# Patient Record
Sex: MALE | Race: WHITE | Employment: OTHER | URBAN - METROPOLITAN AREA
[De-identification: names, ages, dates, MRNs, and addresses within clinical notes are randomized per-mention and may not be internally consistent; named-entity substitution may affect disease eponyms.]

---

## 2019-03-20 RX ORDER — ATORVASTATIN CALCIUM 20 MG/1
20 TABLET, FILM COATED ORAL DAILY
COMMUNITY

## 2019-03-20 RX ORDER — LORATADINE 10 MG/1
10 TABLET ORAL DAILY
COMMUNITY

## 2019-03-20 RX ORDER — MOMETASONE FUROATE 50 UG/1
2 SPRAY, METERED NASAL DAILY
COMMUNITY
End: 2019-11-09

## 2019-03-20 RX ORDER — IRBESARTAN 300 MG/1
300 TABLET ORAL DAILY
COMMUNITY
End: 2019-06-07 | Stop reason: SDUPTHER

## 2019-03-20 RX ORDER — ASPIRIN 81 MG/1
81 TABLET ORAL DAILY
Status: ON HOLD | COMMUNITY
End: 2019-03-28 | Stop reason: SDUPTHER

## 2019-03-27 NOTE — H&P
[facility] PRE-OP HISTORY AND PHYSICAL    Subjective:     Patient is a 54 y.o. male presented with a history of left knee pain. He has had on-and-off problems with his left knee before in the past.  His symptoms have worsened over the past several months. Manuel Byrne has tried to increase his activity but every time he increases his activity he has recurrent pain of the knee. MRI was performed to evaluate for potential meniscal tear. MRI showed a complex meniscal tear extending to posterior root and ACL appears diminished in size possibly representing a partial tear or chronic tear that scarred. Patient Active Problem List    Diagnosis Date Noted    Hyperlipidemia 10/09/2011    HTN (hypertension) 10/09/2011    Carbuncle 10/09/2011    Lipoma 10/09/2011     Past Medical History:   Diagnosis Date    Adverse effect of anesthesia     TAKES A LONG TIME TO WAKE UP    Arthritis     Carbuncle 10/9/2011    Depression     GERD (gastroesophageal reflux disease)     HTN (hypertension) 10/9/2011    Hyperlipidemia 10/9/2011    Hypertension     Lipoma 10/9/2011    Morbid obesity ([address]     Sleep apnea     CPAP       History reviewed. No pertinent surgical history. Prior to Admission medications    Medication Sig Start Date End Date Taking? Authorizing Provider   aspirin delayed-release 81 mg tablet Take 81 mg by mouth daily. Yes Provider, Historical   irbesartan (AVAPRO) 300 mg tablet Take 300 mg by mouth daily. Yes Provider, Historical   atorvastatin (LIPITOR) 20 mg tablet Take 20 mg by mouth daily. Yes Provider, Historical   mometasone (NASONEX) 50 mcg/actuation nasal spray 2 Sprays daily. Yes Provider, Historical   loratadine (CLARITIN) 10 mg tablet Take 10 mg by mouth daily. Yes Provider, Historical   citalopram (CELEXA) 40 mg tablet Take 40 mg by mouth daily.    Yes Other, MD Pedro     Allergies   Allergen Reactions    Other Food Other (comments)     PEANUTS: NAUSEA AND UPSET STOMACH    Avapro [Irbesartan] Other (comments)     PT STATES THIS IS NOT AN ALLERGY      Social History     Tobacco Use    Smoking status: Never Smoker    Smokeless tobacco: Never Used   Substance Use Topics    Alcohol use: Yes     Comment: RARELY      Family History   Problem Relation Age of Onset    Cancer Mother         BREAST    Diabetes Mother     Hypertension Mother     Hypertension Father     Heart Disease Father     No Known Problems Sister     Deep Vein Thrombosis Brother     Deep Vein Thrombosis Other     Anesth Problems Neg Hx       Review of Systems  A comprehensive review of systems was negative except for that written in the HPI. Objective:     General: awake, alert, NAD  HEENT: normocephalic, atraumatic  Neck: normal C-spine, no tenderness, full ROM without pain  Cardiovascular: regular rate, palpable peripheral pulses  Pulmonary: normal air exchange, respiratory effort normal with no retractions  Abdomen: soft, nontender     Left knee: Skin intact. No effusion. There is full range motion of the knee.  He has medial joint line tenderness. There is pain with Tim's maneuver for medial meniscus.  He has no lateral joint tenderness.  There is increased anterior translation with Lachman's exam when compared to his contralateral knee.  This is consistent with his prior knee examination. Roger Mary has a negative posterior drawer test.  He has good quad tone and strength.  There is no soft tissue swelling noted distally. Motor strength and sensation are full and intact in the distal extremity. Pulses are 2+ and the foot is warm and well perfused. Imaging Review  MRI shows evidence of a complex meniscal tear extending to posterior root and ACL appears diminished in size possibly representing a partial tear or chronic tear that scarred.         Assessment:     Left knee medial meniscal tear  Left ACL tear      Plan:     Treatment options were discussed with the patient. Roger Mary wishes to be more active and feels that his left knee symptoms are preventing him from doing so. Nivia Rouse has failed to respond to conservative treatment modalities for his left knee.  We discussed treatment in the form of an arthroscopic examination of his left knee as well as a evaluation under anesthesia of his anterior cruciate ligament.  He demonstrates instability consistent with anterior cruciate ligament on both exam and  visual inspection, we'll proceed with an arthroscopic-assisted anterior cruciate ligament reconstruction.  His medial meniscus will also be evaluated at that time and the decision for repair versus meniscectomy will be made on an acute basis of the location and configuration of his tear.  We have discussed the risks and benefits of this.  We discussed the 2 protocols of rehabilitation that would follow. The risks and benefits were described to the patient.  The patient understands there is a risk of infection, postoperative pain, numbness, tingling, stiffness DVT, PE, MI, CVA and any other unforeseen events.  The patient also understands there is a long rehabilitative process that typically follows the surgical procedure.  We talked about the possibility of not being able to alleviate all of the discomfort.  Also, I explained Driver Cure is no guarantee all function and strength will return.  The patient understands the possiblity that  implants may be utilized during this surgery.  The patient also understands the generalized, associated risk of anesthetic and wishes to proceed in an elective fashion.       Ibis Cheung MD

## 2019-03-28 ENCOUNTER — HOSPITAL ENCOUNTER (OUTPATIENT)
Age: 49
Discharge: HOME | End: 2019-03-28
Attending: ORTHOPAEDIC SURGERY | Admitting: ORTHOPAEDIC SURGERY
Payer: COMMERCIAL

## 2019-03-28 ENCOUNTER — ANESTHESIA EVENT (OUTPATIENT)
Dept: SURGERY | Age: 49
End: 2019-03-28
Payer: COMMERCIAL

## 2019-03-28 ENCOUNTER — ANESTHESIA (OUTPATIENT)
Dept: SURGERY | Age: 49
End: 2019-03-28
Payer: COMMERCIAL

## 2019-03-28 VITALS
TEMPERATURE: 98 F | BODY MASS INDEX: 33.88 KG/M2 | HEART RATE: 87 BPM | RESPIRATION RATE: 10 BRPM | DIASTOLIC BLOOD PRESSURE: 84 MMHG | WEIGHT: 242 LBS | HEIGHT: 71 IN | OXYGEN SATURATION: 98 % | SYSTOLIC BLOOD PRESSURE: 132 MMHG

## 2019-03-28 DIAGNOSIS — I10 ESSENTIAL HYPERTENSION: ICD-10-CM

## 2019-03-28 DIAGNOSIS — D17.20 LIPOMA OF LOWER EXTREMITY, UNSPECIFIED LATERALITY: ICD-10-CM

## 2019-03-28 DIAGNOSIS — M23.204 OLD COMPLEX TEAR OF MEDIAL MENISCUS OF LEFT KNEE: Primary | ICD-10-CM

## 2019-03-28 DIAGNOSIS — E78.49 OTHER HYPERLIPIDEMIA: ICD-10-CM

## 2019-03-28 DIAGNOSIS — L02.93 CARBUNCLE: ICD-10-CM

## 2019-03-28 PROBLEM — S83.232A COMPLEX TEAR OF MEDIAL MENISCUS OF LEFT KNEE: Status: ACTIVE | Noted: 2019-03-28

## 2019-03-28 LAB
GLUCOSE BLD STRIP.AUTO-MCNC: 106 MG/DL (ref 65–100)
SERVICE CMNT-IMP: ABNORMAL

## 2019-03-28 PROCEDURE — 77030010509 HC AIRWY LMA MSK TELE -A: Performed by: ANESTHESIOLOGY

## 2019-03-28 PROCEDURE — 77030020782 HC GWN BAIR PAWS FLX 3M -B

## 2019-03-28 PROCEDURE — 76210000016 HC OR PH I REC 1 TO 1.5 HR: Performed by: ORTHOPAEDIC SURGERY

## 2019-03-28 PROCEDURE — 77030020269 HC MISC IMPL: Performed by: ORTHOPAEDIC SURGERY

## 2019-03-28 PROCEDURE — 77030018835 HC SOL IRR LR ICUM -A: Performed by: ORTHOPAEDIC SURGERY

## 2019-03-28 PROCEDURE — C1713 ANCHOR/SCREW BN/BN,TIS/BN: HCPCS | Performed by: ORTHOPAEDIC SURGERY

## 2019-03-28 PROCEDURE — 77030031139 HC SUT VCRL2 J&J -A: Performed by: ORTHOPAEDIC SURGERY

## 2019-03-28 PROCEDURE — 77030021162 HC TU IRR ENDOSC BAXT -A: Performed by: ORTHOPAEDIC SURGERY

## 2019-03-28 PROCEDURE — 76010000172 HC OR TIME 2.5 TO 3 HR INTENSV-TIER 1: Performed by: ORTHOPAEDIC SURGERY

## 2019-03-28 PROCEDURE — 74011250637 HC RX REV CODE- 250/637: Performed by: ANESTHESIOLOGY

## 2019-03-28 PROCEDURE — 77030000032 HC CUF TRNQT ZIMM -B: Performed by: ORTHOPAEDIC SURGERY

## 2019-03-28 PROCEDURE — 82962 GLUCOSE BLOOD TEST: CPT

## 2019-03-28 PROCEDURE — 77030020268 HC MISC GENERAL SUPPLY: Performed by: ORTHOPAEDIC SURGERY

## 2019-03-28 PROCEDURE — 74011250636 HC RX REV CODE- 250/636

## 2019-03-28 PROCEDURE — 74011250636 HC RX REV CODE- 250/636: Performed by: ANESTHESIOLOGY

## 2019-03-28 PROCEDURE — 77030011640 HC PAD GRND REM COVD -A: Performed by: ORTHOPAEDIC SURGERY

## 2019-03-28 PROCEDURE — 74011250636 HC RX REV CODE- 250/636: Performed by: ORTHOPAEDIC SURGERY

## 2019-03-28 PROCEDURE — 77030006884 HC BLD SHV INCIS S&N -B: Performed by: ORTHOPAEDIC SURGERY

## 2019-03-28 PROCEDURE — 77030008496 HC TBNG ARTHSC IRR S&N -B: Performed by: ORTHOPAEDIC SURGERY

## 2019-03-28 PROCEDURE — 77030002933 HC SUT MCRYL J&J -A: Performed by: ORTHOPAEDIC SURGERY

## 2019-03-28 PROCEDURE — 77030018547 HC SUT ETHBND1 J&J -B: Performed by: ORTHOPAEDIC SURGERY

## 2019-03-28 PROCEDURE — 77030002922 HC SUT FBRWRE ARTH -B: Performed by: ORTHOPAEDIC SURGERY

## 2019-03-28 PROCEDURE — 77030016678 HC BUR SHV4 S&N -B: Performed by: ORTHOPAEDIC SURGERY

## 2019-03-28 PROCEDURE — 77030003601 HC NDL NRV BLK BBMI -A

## 2019-03-28 PROCEDURE — 76210000021 HC REC RM PH II 0.5 TO 1 HR: Performed by: ORTHOPAEDIC SURGERY

## 2019-03-28 PROCEDURE — 74011000250 HC RX REV CODE- 250: Performed by: ORTHOPAEDIC SURGERY

## 2019-03-28 PROCEDURE — 76060000037 HC ANESTHESIA 3 TO 3.5 HR: Performed by: ORTHOPAEDIC SURGERY

## 2019-03-28 PROCEDURE — 74011000272 HC RX REV CODE- 272: Performed by: ORTHOPAEDIC SURGERY

## 2019-03-28 PROCEDURE — 77030010428: Performed by: ORTHOPAEDIC SURGERY

## 2019-03-28 DEVICE — ULTRABUTTON ADJUSTABLE FIXATION DEVICE
Type: IMPLANTABLE DEVICE | Site: KNEE | Status: FUNCTIONAL
Brand: ULTRABUTTON

## 2019-03-28 RX ORDER — ONDANSETRON 2 MG/ML
4 INJECTION INTRAMUSCULAR; INTRAVENOUS AS NEEDED
Status: DISCONTINUED | OUTPATIENT
Start: 2019-03-28 | End: 2019-03-28 | Stop reason: HOSPADM

## 2019-03-28 RX ORDER — SODIUM CHLORIDE 9 MG/ML
25 INJECTION, SOLUTION INTRAVENOUS CONTINUOUS
Status: DISCONTINUED | OUTPATIENT
Start: 2019-03-28 | End: 2019-03-28 | Stop reason: HOSPADM

## 2019-03-28 RX ORDER — FENTANYL CITRATE 50 UG/ML
50 INJECTION, SOLUTION INTRAMUSCULAR; INTRAVENOUS AS NEEDED
Status: DISCONTINUED | OUTPATIENT
Start: 2019-03-28 | End: 2019-03-28 | Stop reason: HOSPADM

## 2019-03-28 RX ORDER — ONDANSETRON 2 MG/ML
INJECTION INTRAMUSCULAR; INTRAVENOUS AS NEEDED
Status: DISCONTINUED | OUTPATIENT
Start: 2019-03-28 | End: 2019-03-28 | Stop reason: HOSPADM

## 2019-03-28 RX ORDER — SODIUM CHLORIDE, SODIUM LACTATE, POTASSIUM CHLORIDE, CALCIUM CHLORIDE 600; 310; 30; 20 MG/100ML; MG/100ML; MG/100ML; MG/100ML
1000 INJECTION, SOLUTION INTRAVENOUS CONTINUOUS
Status: DISCONTINUED | OUTPATIENT
Start: 2019-03-28 | End: 2019-03-28 | Stop reason: HOSPADM

## 2019-03-28 RX ORDER — OXYCODONE HYDROCHLORIDE 5 MG/1
5 TABLET ORAL AS NEEDED
Status: DISCONTINUED | OUTPATIENT
Start: 2019-03-28 | End: 2019-03-28 | Stop reason: HOSPADM

## 2019-03-28 RX ORDER — ASPIRIN 81 MG/1
325 TABLET ORAL 2 TIMES DAILY
Qty: 60 TAB | Refills: 0 | Status: SHIPPED | OUTPATIENT
Start: 2019-03-28

## 2019-03-28 RX ORDER — CEFAZOLIN SODIUM/WATER 2 G/20 ML
2 SYRINGE (ML) INTRAVENOUS ONCE
Status: COMPLETED | OUTPATIENT
Start: 2019-03-28 | End: 2019-03-28

## 2019-03-28 RX ORDER — KETOROLAC TROMETHAMINE 30 MG/ML
INJECTION, SOLUTION INTRAMUSCULAR; INTRAVENOUS
Status: COMPLETED
Start: 2019-03-28 | End: 2019-03-28

## 2019-03-28 RX ORDER — MIDAZOLAM HYDROCHLORIDE 1 MG/ML
0.5 INJECTION, SOLUTION INTRAMUSCULAR; INTRAVENOUS
Status: DISCONTINUED | OUTPATIENT
Start: 2019-03-28 | End: 2019-03-28 | Stop reason: HOSPADM

## 2019-03-28 RX ORDER — FENTANYL CITRATE 50 UG/ML
25 INJECTION, SOLUTION INTRAMUSCULAR; INTRAVENOUS
Status: COMPLETED | OUTPATIENT
Start: 2019-03-28 | End: 2019-03-28

## 2019-03-28 RX ORDER — LIDOCAINE HYDROCHLORIDE 10 MG/ML
0.1 INJECTION, SOLUTION EPIDURAL; INFILTRATION; INTRACAUDAL; PERINEURAL AS NEEDED
Status: DISCONTINUED | OUTPATIENT
Start: 2019-03-28 | End: 2019-03-28 | Stop reason: HOSPADM

## 2019-03-28 RX ORDER — SODIUM CHLORIDE, SODIUM LACTATE, POTASSIUM CHLORIDE, CALCIUM CHLORIDE 600; 310; 30; 20 MG/100ML; MG/100ML; MG/100ML; MG/100ML
100 INJECTION, SOLUTION INTRAVENOUS CONTINUOUS
Status: DISCONTINUED | OUTPATIENT
Start: 2019-03-28 | End: 2019-03-28 | Stop reason: HOSPADM

## 2019-03-28 RX ORDER — ROPIVACAINE HYDROCHLORIDE 5 MG/ML
INJECTION, SOLUTION EPIDURAL; INFILTRATION; PERINEURAL
Status: COMPLETED | OUTPATIENT
Start: 2019-03-28 | End: 2019-03-28

## 2019-03-28 RX ORDER — MIDAZOLAM HYDROCHLORIDE 1 MG/ML
INJECTION, SOLUTION INTRAMUSCULAR; INTRAVENOUS AS NEEDED
Status: DISCONTINUED | OUTPATIENT
Start: 2019-03-28 | End: 2019-03-28 | Stop reason: HOSPADM

## 2019-03-28 RX ORDER — PROPOFOL 10 MG/ML
INJECTION, EMULSION INTRAVENOUS AS NEEDED
Status: DISCONTINUED | OUTPATIENT
Start: 2019-03-28 | End: 2019-03-28 | Stop reason: HOSPADM

## 2019-03-28 RX ORDER — MORPHINE SULFATE 10 MG/ML
2 INJECTION, SOLUTION INTRAMUSCULAR; INTRAVENOUS
Status: DISCONTINUED | OUTPATIENT
Start: 2019-03-28 | End: 2019-03-28 | Stop reason: HOSPADM

## 2019-03-28 RX ORDER — ACETAMINOPHEN 325 MG/1
650 TABLET ORAL ONCE
Status: COMPLETED | OUTPATIENT
Start: 2019-03-28 | End: 2019-03-28

## 2019-03-28 RX ORDER — KETOROLAC TROMETHAMINE 30 MG/ML
30 INJECTION, SOLUTION INTRAMUSCULAR; INTRAVENOUS
Status: COMPLETED | OUTPATIENT
Start: 2019-03-28 | End: 2019-03-28

## 2019-03-28 RX ORDER — MIDAZOLAM HYDROCHLORIDE 1 MG/ML
1 INJECTION, SOLUTION INTRAMUSCULAR; INTRAVENOUS AS NEEDED
Status: DISCONTINUED | OUTPATIENT
Start: 2019-03-28 | End: 2019-03-28 | Stop reason: HOSPADM

## 2019-03-28 RX ORDER — ROPIVACAINE HYDROCHLORIDE 5 MG/ML
150 INJECTION, SOLUTION EPIDURAL; INFILTRATION; PERINEURAL AS NEEDED
Status: DISCONTINUED | OUTPATIENT
Start: 2019-03-28 | End: 2019-03-28 | Stop reason: HOSPADM

## 2019-03-28 RX ORDER — FENTANYL CITRATE 50 UG/ML
INJECTION, SOLUTION INTRAMUSCULAR; INTRAVENOUS AS NEEDED
Status: DISCONTINUED | OUTPATIENT
Start: 2019-03-28 | End: 2019-03-28 | Stop reason: HOSPADM

## 2019-03-28 RX ORDER — SODIUM CHLORIDE, SODIUM LACTATE, POTASSIUM CHLORIDE, CALCIUM CHLORIDE 600; 310; 30; 20 MG/100ML; MG/100ML; MG/100ML; MG/100ML
INJECTION, SOLUTION INTRAVENOUS
Status: DISCONTINUED | OUTPATIENT
Start: 2019-03-28 | End: 2019-03-28 | Stop reason: HOSPADM

## 2019-03-28 RX ORDER — OXYCODONE HYDROCHLORIDE 5 MG/1
10 TABLET ORAL
Qty: 50 TAB | Refills: 0 | Status: SHIPPED | OUTPATIENT
Start: 2019-03-28 | End: 2019-03-31

## 2019-03-28 RX ADMIN — FENTANYL CITRATE 25 MCG: 50 INJECTION, SOLUTION INTRAMUSCULAR; INTRAVENOUS at 10:28

## 2019-03-28 RX ADMIN — Medication 2 G: at 10:22

## 2019-03-28 RX ADMIN — SODIUM CHLORIDE, SODIUM LACTATE, POTASSIUM CHLORIDE, CALCIUM CHLORIDE: 600; 310; 30; 20 INJECTION, SOLUTION INTRAVENOUS at 12:54

## 2019-03-28 RX ADMIN — FENTANYL CITRATE 25 MCG: 50 INJECTION, SOLUTION INTRAMUSCULAR; INTRAVENOUS at 11:22

## 2019-03-28 RX ADMIN — ROPIVACAINE HYDROCHLORIDE 20 ML: 5 INJECTION, SOLUTION EPIDURAL; INFILTRATION; PERINEURAL at 10:13

## 2019-03-28 RX ADMIN — FENTANYL CITRATE 25 MCG: 50 INJECTION INTRAMUSCULAR; INTRAVENOUS at 13:50

## 2019-03-28 RX ADMIN — MIDAZOLAM HYDROCHLORIDE 5 MG: 1 INJECTION, SOLUTION INTRAMUSCULAR; INTRAVENOUS at 10:09

## 2019-03-28 RX ADMIN — SODIUM CHLORIDE, SODIUM LACTATE, POTASSIUM CHLORIDE, AND CALCIUM CHLORIDE 1000 ML: 600; 310; 30; 20 INJECTION, SOLUTION INTRAVENOUS at 08:39

## 2019-03-28 RX ADMIN — FENTANYL CITRATE 25 MCG: 50 INJECTION INTRAMUSCULAR; INTRAVENOUS at 13:40

## 2019-03-28 RX ADMIN — KETOROLAC TROMETHAMINE 30 MG: 30 INJECTION, SOLUTION INTRAMUSCULAR; INTRAVENOUS at 14:20

## 2019-03-28 RX ADMIN — PROPOFOL 200 MG: 10 INJECTION, EMULSION INTRAVENOUS at 10:19

## 2019-03-28 RX ADMIN — OXYCODONE HYDROCHLORIDE 5 MG: 5 TABLET ORAL at 14:27

## 2019-03-28 RX ADMIN — ONDANSETRON 4 MG: 2 INJECTION INTRAMUSCULAR; INTRAVENOUS at 12:54

## 2019-03-28 RX ADMIN — FENTANYL CITRATE 100 MCG: 50 INJECTION, SOLUTION INTRAMUSCULAR; INTRAVENOUS at 10:15

## 2019-03-28 RX ADMIN — FENTANYL CITRATE 25 MCG: 50 INJECTION INTRAMUSCULAR; INTRAVENOUS at 13:30

## 2019-03-28 RX ADMIN — SODIUM CHLORIDE, SODIUM LACTATE, POTASSIUM CHLORIDE, CALCIUM CHLORIDE: 600; 310; 30; 20 INJECTION, SOLUTION INTRAVENOUS at 10:09

## 2019-03-28 RX ADMIN — FENTANYL CITRATE 25 MCG: 50 INJECTION INTRAMUSCULAR; INTRAVENOUS at 13:45

## 2019-03-28 RX ADMIN — FENTANYL CITRATE 50 MCG: 50 INJECTION, SOLUTION INTRAMUSCULAR; INTRAVENOUS at 10:09

## 2019-03-28 RX ADMIN — MIDAZOLAM HYDROCHLORIDE 5 MG: 1 INJECTION, SOLUTION INTRAMUSCULAR; INTRAVENOUS at 10:15

## 2019-03-28 RX ADMIN — ACETAMINOPHEN 650 MG: 325 TABLET ORAL at 08:27

## 2019-03-28 RX ADMIN — MORPHINE SULFATE 2 MG: 10 INJECTION, SOLUTION INTRAMUSCULAR; INTRAVENOUS at 14:00

## 2019-03-28 RX ADMIN — MORPHINE SULFATE 2 MG: 10 INJECTION, SOLUTION INTRAMUSCULAR; INTRAVENOUS at 14:10

## 2019-03-28 NOTE — PERIOP NOTES
Patient: Deric Silva MRN: [id number]  SSN: [id number]   YOB: 1970  Age: 54 y.o. Sex: male     Patient is status post Procedure(s):  LEFT KNEE ARTHROSCOPY WITH ANTERIOR CRUCIATE LIGAMENT RECONSTRUCTION WITH MENISCECTOMY VERSUS MENISCUS REPAIR. Surgeon(s) and Role:     Girish Boateng MD - Primary                    Peripheral IV 03/28/19 Left;Posterior Hand (Active)   Site Assessment Clean, dry, & intact 3/28/2019  8:38 AM   Phlebitis Assessment 0 3/28/2019  8:38 AM   Infiltration Assessment 0 3/28/2019  8:38 AM   Dressing Status Clean, dry, & intact; New 3/28/2019  8:38 AM   Dressing Type Tape;Transparent 3/28/2019  8:38 AM   Hub Color/Line Status Green; Infusing 3/28/2019  8:38 AM                           Dressing/Packing:     Splint/Cast: Wound Knee Left-Splint Type/Material: Other(Comment)(Knee immobilizer)]

## 2019-03-28 NOTE — DISCHARGE INSTRUCTIONS
See chart for instructions      ______________________________________________________________________    Anesthesia Discharge Instructions    After general anesthesia or intervenous sedation, for 24 hours or while taking prescription Narcotics:  · Limit your activities  · Do not drive or operate hazardous machinery  · If you have not urinated within 8 hours after discharge, please contact your surgeon on call. · Do not make important personal or business decisions  · Do not drink alcoholic beverages    Report the following to your surgeon:  · Excessive pain, swelling, redness or odor of or around the surgical area  · Temperature over 100.5 degrees  · Nausea and vomiting lasting longer than 4 hours or if unable to take medication  · Any signs of decreased circulation or nerve impairment to extremity:  Change in color, persistent numbness, tingling, coldness or increased pain.   · Any questions      14:27 Oxycodone

## 2019-03-28 NOTE — ANESTHESIA POSTPROCEDURE EVALUATION
Post-Anesthesia Evaluation and Assessment Patient: Stanton Garcia MRN: [id number]  SSN: [id number] YOB: 1970  Age: 54 y.o. Sex: male I have evaluated the patient and they are stable and ready for discharge from the PACU. Cardiovascular Function/Vital Signs Visit Vitals BP (!) 140/92 Pulse 83 Temp 36.7 °C (98 °F) Resp (!) 7 Ht 5' 11\" (1.803 m) Wt 109.8 kg (242 lb) SpO2 98% BMI 33.75 kg/m² Patient is status post General anesthesia for Procedure(s): LEFT KNEE ARTHROSCOPY WITH ANTERIOR CRUCIATE LIGAMENT RECONSTRUCTION WITH MENISCECTOMY. Nausea/Vomiting: None Postoperative hydration reviewed and adequate. Pain: 
Pain Scale 1: Numeric (0 - 10) (03/28/19 0815) Pain Intensity 1: 0 (03/28/19 0815) Managed Neurological Status: At baseline Mental Status, Level of Consciousness: Alert and  oriented to person, place, and time Pulmonary Status:  
O2 Device: Nasal cannula (03/28/19 1317) Adequate oxygenation and airway patent Complications related to anesthesia: None Post-anesthesia assessment completed. No concerns Signed By: William Carvajal MD   
 March 28, 2019 Procedure(s): LEFT KNEE ARTHROSCOPY WITH ANTERIOR CRUCIATE LIGAMENT RECONSTRUCTION WITH MENISCECTOMY. general 
 
<BSHSIANPOST> Vitals Value Taken Time /87 3/28/2019  1:20 PM  
Temp 36.7 °C (98 °F) 3/28/2019  1:17 PM  
Pulse 85 3/28/2019  1:23 PM  
Resp 5 3/28/2019  1:23 PM  
SpO2 100 % 3/28/2019  1:23 PM  
Vitals shown include unvalidated device data.

## 2019-03-28 NOTE — ANESTHESIA PREPROCEDURE EVALUATION
Relevant Problems No relevant active problems Anesthetic History No history of anesthetic complications Review of Systems / Medical History Patient summary reviewed, nursing notes reviewed and pertinent labs reviewed Pulmonary Sleep apnea: CPAP Neuro/Psych Psychiatric history Cardiovascular Hypertension GI/Hepatic/Renal 
  
GERD Endo/Other Obesity and arthritis Other Findings Physical Exam 
 
Airway Mallampati: II 
TM Distance: > 6 cm Neck ROM: normal range of motion Mouth opening: Normal 
 
 Cardiovascular Regular rate and rhythm,  S1 and S2 normal,  no murmur, click, rub, or gallop Dental 
No notable dental hx Pulmonary Breath sounds clear to auscultation Abdominal 
GI exam deferred Other Findings Anesthetic Plan ASA: 2 Anesthesia type: general 
 
 
Post-op pain plan if not by surgeon: peripheral nerve block single Induction: Intravenous Anesthetic plan and risks discussed with: Patient

## 2019-03-28 NOTE — ANESTHESIA PROCEDURE NOTES
Peripheral Block Start time: 3/28/2019 10:05 AM 
End time: 3/28/2019 10:13 AM 
Performed by: Jovani Gusman MD 
Authorized by: Jovani Gusman MD  
 
 
Pre-procedure: Indications: at surgeon's request and post-op pain management Preanesthetic Checklist: patient identified, risks and benefits discussed, site marked, timeout performed, anesthesia consent given and patient being monitored Timeout Time: 10:05 Block Type:  
Block Type: Adductor canal 
Laterality:  Left Monitoring:  Standard ASA monitoring, continuous pulse ox, frequent vital sign checks, heart rate, responsive to questions and oxygen Injection Technique:  Single shot Procedures: ultrasound guided Patient Position: supine Prep: chlorhexidine Location:  Mid thigh Needle Type:  Stimuplex Needle Gauge:  22 G Needle Localization:  Ultrasound guidance Assessment: 
Number of attempts:  1 Injection Assessment:  Incremental injection every 5 mL, local visualized surrounding nerve on ultrasound, negative aspiration for blood, no paresthesia, no intravascular symptoms and negative aspiration for CSF Patient tolerance:  Patient tolerated the procedure well with no immediate complications

## 2019-03-28 NOTE — BRIEF OP NOTE
BRIEF OPERATIVE NOTE    Date of Procedure: 3/28/2019   Preoperative Diagnosis: LEFT KNEE ACL TEAR  Postoperative Diagnosis: LEFT KNEE ACL TEAR    Procedure(s):  LEFT KNEE ARTHROSCOPY WITH ANTERIOR CRUCIATE LIGAMENT RECONSTRUCTION WITH MENISCECTOMY VERSUS MENISCUS REPAIR  Surgeon(s) and Role:     Fide Sanches MD - Primary         Surgical Assistant: tech    Surgical Staff:  Circ-1: Susie Melendez RN  Scrub Tech-1: Gauri Dodge  Float Staff: Maude Haynes RN  Event Time In Time Out   Incision Start 1044    Incision Close       Anesthesia: Spinal with mac  Estimated Blood Loss: minimal  Specimens: none  Findings: acl tear   Complications: none  Implants:   Implant Name Type Inv.  Item Serial No.  Lot No. LRB No. Used Action   Ultrabutton adjustable fuxation device    N[employer] [id number] Left 1 Implanted   Biosure Regenesorb Interference Screw 1mm x 30mm    1228  [id number] Left 1 Implanted

## 2019-03-29 NOTE — OP NOTES
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 POSTOPERATIVE DIAGNOSES:  1. Chronic anterior cruciate ligament insufficiency of left knee. 2.  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 3.  Degenerative tear of the posterior horn of medial meniscus. PROCEDURE PERFORMED:  1. Arthroscopic-assisted anterior cruciate ligament reconstruction using a 5-stranded hamstring autograft, size 10 mm, with an Ultrabraid [employer] Endobutton and a [employer] Endobutton HA interference screw 11 x 30.  2.  Arthroscopic partial medial meniscectomy. 3.  Arthroscopic chondroplasty, medial femoral condyle. SURGEON:  Maverick Padron MD    ASSISTANT:  Benita Jones MD    ANESTHESIA:  General with a regional block    COMPLICATIONS:  Zero    PREOPERATIVE MEDICINE:  Ancef of 2 g. SPECIMENS REMOVED:  Negative. IMPLANTS:  See above. ESTIMATED BLOOD LOSS:  Minimal.    FLUIDS GIVEN:  Crystalloids. HISTORY:  The patient is a 31-year-old who presented to my office with recurrent level of discomfort in left knee. The patient had noted increasing discomfort of chronic discomfort in left knee. He described a sensation of clunking of the knee. He states the knee felt like it continued to shift. He did have an injury that occurred many years ago, but did not think much of it. The examination was consistent with medial base joint pain. He also had evidence of an increased Lachman's on his examination. Therefore, an MRI evaluation was performed. He was seen back in the office. The MRI was consistent with cartilage fissuring of the patella as well as areas of medial femoral condyle.   There is also radial tear of the posterior horn of the medial meniscus that did not involve the root. There was evidence of attenuation of the ACL. The fibers in the ACL were indeed noted, but proximally there was very thinning of this. In consultation with the patient, I went over the MRI with the patient. I described the finding of the arthrosis of the joint as well as the posterior horn of medial meniscus tear. We discussed treatment options. We went over operative versus nonoperative management. He advanced conservative treatment, so therefore we engaged more in the surgical arena. In this situation, I gave him the option of arthroscopic partial medial meniscectomy. However, based on the clinical examination and the MRI, I felt he very well had a chronic anterior cruciate ligament injury. With that in mind, I explained to the patient at the time of surgery we would evaluate the ACL. If there is any evidence of ACL incompetency, then I would suggest proceeding with an anterior cruciate ligament reconstruction. I talked to him about allograft versus autograft. I feel he was a good candidate for hamstring autograft. We talked about the extended recovery that would be involved if indeed in anterior cruciate ligament reconstruction was performed. He understood and he would be in a brace in the extensive recovery and would follow this. I talked about the risks of infection, DVT, PE, MI, CVA, and other unforeseen events could occur in the perioperative fashion. We talked once again about the metal and plastic that may be utilized in the scenario. Lastly, I explained to the patient there was no guarantee this would take care all of his pain. He does also have lastly evidence for arthrosis of the joint and therefore there is no guarantee this would take care all of his pain. Understanding all the risks and benefits as mentioned above, he wished to proceed with the surgery, signed the consent and was taken to surgery.     OPERATIVE PLAN:  The patient was taken back to surgery, placed supine on operating table, administered general anesthetic with intubation. The patient received regional block. Examination of the knee revealed evidence of a positive Lachman's test.  He also had a pivot glide which was not evident on the contralateral side. A tourniquet was applied along the left thigh. The patient was laid supine with a small amount of reflex in the lumbar spine. An Esmarch was used to exsanguinate the extremity. The left leg was placed on the arthroscopic leg doran, the contralateral leg in a well-padded position. The patient received 2 g of Ancef. The left leg was then sterilely prepped and draped in an orthopedic fashion. A time-out was performed and all parties agreed that the left knee was the correct knee. Following this, a vertical anterolateral portal was made with an 11-blade. The scope was introduced into the patellofemoral joint. There was mild fissuring of the patellar facet, but there were no brandyn tears or loose cartilage of the patella. Trochlea also had a little bit of softening, but no evidence of loose fragments. Once the medial compartment and anteromedial portal was made, a probe was inserted. There was a tear that was identified along the posterior horn evidenced with a radial type, but the tear did not involve the red-red or the red-white zone. The horn was intact, although was scarred up. There was no detachment of the horn. A biter was inserted into the medial portal and a mild amount of this was debrided so the transition of the posterior horn was smoothed in a nice fashion. There was grade II to III chondrosis along the medial cartilage weightbearing portion of the medial femoral condyle and a chondroplasty was performed. At that time, a small portion of the fat pad was removed with a 4.5 shaver. We then evaluated the ACL. The tibial end footprint of the ACL was intact.   Along the proximal attachment site, there was evidence of scar tissue and there was an empty notch sign. There was also a part of the ACL that was scarred up against PCL. This obviously confirmed our suspicion of ACL instability as the patient most likely had an anterior cruciate ligament tear some years prior and he had some level of scar of the ACL, which caused the finding on the MRI and the finding on clinical exam.  With this in mind, the shaver was inserted and the remnant of the ACL was debrided. The lateral compartment was then evaluated. There was no meniscal or cartilage damage on the weightbearing portion. Right along the eminence, there was a small area of chondromalacia. At that time, the scope was removed. A 1-inch incision was made along the anteromedial tibia. Sharp dissection was taken down the skin and blunt dissection was taken down through the sartorial fascia. The sartorial fascia was opened superiorly and then the gracilis and the semitendinosus were released. They were then  from themselves and whipstitched. An open  was then used to retrieve these. Tendons were taken in the back table. They were striped off muscle. Based on the size, this was created and was converted to a 5-stranded graft which measured 10 mm. The graft was then placed through the Ultrabraid and tensioned to 20 pounds. We then went back into the joint. The scope was introduced into the anterolateral portal working through the anteromedial portal.  The over-the-top position was identified. Small portion of the wall was debrided, as there were some osteophyte overgrowth. An anteromedial accessory portal was made and through this an over-the-top femur was inserted. The center of the ACL footprint was identified. A guide pin was placed in this center. A 10-mm eccentric reamer was then inserted and reamed to the depth of 30 mm. The Endobutton was then used to ream out the anterolateral cortex of the knee.   The entire tunnel length measured 40. We then place these measurements on femur graft. Through an original anteromedial portal, a tibial aimer was inserted through the incision. The guide pin was advanced. This was over reamed with a 10 mm reamer to the center of the tibial footprint. A base pin was then placed through the accessory medial portal and now through the anterolateral skin of the thigh. Suture was attached to this and this was drawn back down to the tibial tunnel. This was used at the suture shuttle. The graft and Endobutton were placed up to the tunnel. We witnessed the flipping of this. We then tensioned using the self-tensioning device of this implant. The limbs were then tied onto [employer] tensioning device and 72 Ramirez's force was deployed. We place the knee through 30 repetitions and retention. We dilated using taps and placed a 11 x 30 mm TADEO screw from [employer] into the tibial tunnel with excellent purchase. At that time, we evaluated the graft, it was anatomic with no wall impingement and had great tension. At that time, the fluid was evacuated out of the joint. The portals were closed with 4-0 nylon in simple interrupted fashion. The sartorial fascia was closed with 0 Vicryl. 2-0 Vicryl was placed in the subcutaneous tissue and running 4-0 Monocryl was placed in the epidermis. Steri-Strips were applied over the incision followed by Adaptic and bacitracin ointment. A  sterile dry dressing was placed over the knee. The patient was placed in a TROM brace, awakened from general anesthetic in stable condition, and transferred to recovery room.       Bryce Frances MD      [id number]  D:  03/28/2019 13:25  T:  03/29/2019 0:10  JOB #:  [id number]

## 2019-04-17 ENCOUNTER — HOSPITAL ENCOUNTER (OUTPATIENT)
Dept: PHYSICAL THERAPY | Age: 49
Discharge: HOME | End: 2019-04-17
Payer: COMMERCIAL

## 2019-04-17 PROCEDURE — 97016 VASOPNEUMATIC DEVICE THERAPY: CPT | Performed by: PHYSICAL THERAPY ASSISTANT

## 2019-04-17 PROCEDURE — 97110 THERAPEUTIC EXERCISES: CPT | Performed by: PHYSICAL THERAPY ASSISTANT

## 2019-04-17 PROCEDURE — 97162 PT EVAL MOD COMPLEX 30 MIN: CPT | Performed by: PHYSICAL THERAPY ASSISTANT

## 2019-04-17 NOTE — PROGRESS NOTES
[employer] [facility], [address] [place] [address] Phone: [phone]  Fax: [phone] Plan of Care/Statement of Necessity for Physical Therapy Services  2-15 Patient name: Analilia Arredondo  : 1970  Provider#: [id number] Referral source: Temi Flores MD     
Medical/Treatment Diagnosis: Left knee pain [M25.562] Prior Hospitalization: see medical history Comorbidities: see chart Prior Level of Function: restaurant owner Medications: Verified on Patient Summary List 
Start of Care: 19      Onset Date: 3/28/19 The Plan of Care and following information is based on the information from the initial evaluation. Assessment/ key information: Pt is s/p L ACL repair w/ hamstrings graft that affects his ability to perform job activities, ambulate, navigate stairs, and transfer. Pt is a good candidate for therapy in order to address impairments listed below. Evaluation Complexity History MEDIUM  Complexity : 1-2 comorbidities / personal factors will impact the outcome/ POC ; Examination MEDIUM Complexity : 3 Standardized tests and measures addressing body structure, function, activity limitation and / or participation in recreation  ;Presentation MEDIUM Complexity : Evolving with changing characteristics  ; Clinical Decision Making MEDIUM Complexity : FOTO score of 26-74 Overall Complexity Rating: MEDIUM Problem List: pain affecting function, decrease ROM, decrease strength, edema affecting function, impaired gait/ balance, decrease ADL/ functional abilitiies, decrease activity tolerance, decrease flexibility/ joint mobility and decrease transfer abilities Treatment Plan may include any combination of the following: Therapeutic exercise, Therapeutic activities, Neuromuscular re-education, Physical agent/modality, Gait/balance training, Manual therapy and Patient education Patient / Family readiness to learn indicated by: asking questions Persons(s) to be included in education: patient (P) Barriers to Learning/Limitations: None Patient Goal (s): get in better shape Patient Self Reported Health Status: good Rehabilitation Potential: good Short Term Goals: To be accomplished in 2 weeks: Pt will be independent w/ HEP Pt will report 0/10 pain at rest 
Pt will demonstrate 120 deg of knee flexion ROM Long Term Goals: To be accomplished in 12 weeks: Pt will report over 15 point improvement on FOTO Pt will demonstrate 130 degrees in knee flexion ROM Pt will be able to jog for 10 minutes w/o pain Frequency / Duration: Patient to be seen 2 times per week for 12 weeks. Patient/ Caregiver education and instruction: self care [x]  Plan of care has been reviewed with MICHEL Galeas PT, DPT 4/17/2019 
 
________________________________________________________________________ I certify that the above Therapy Services are being furnished while the patient is under my care. I agree with the treatment plan and certify that this therapy is necessary. [de-identified] Signature:____________________  Date:____________Time: _________

## 2019-04-17 NOTE — PROGRESS NOTES
PT INITIAL EVALUATION NOTE - [facility] 2-15 Patient Name: Bethanie Townsend Date:2019 : 1970 [x]  Patient  Verified Payor: Cristine Luis / Plan: Noelle Mclaughlin / Product Type: Radha Sera / In time:6:10 pm  Out time:7:00 pm 
Total Treatment Time (min): 50 Total Timed Codes (min): 15 
1:1 Treatment Time ( only): 15 Visit #: 1 Treatment Area: Left knee pain [M25.562] SUBJECTIVE Pain Level (0-10 scale): 5 Any medication changes, allergies to medications, adverse drug reactions, diagnosis change, or new procedure performed?: [] No    [x] Yes (see summary sheet for update) Subjective:   
Pt is s/p L ACL repair (hamstrings graft) and meniscectomy on 3/28/19. Pt tore ACL 5 years ago when playing volleyball and tried to rehab it but he never was able to return to his normal activities. 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 Pt would like to be able to be active so he can get in better physical shape. Pt was doing very well after surgery until he felt his hamstrings pull yesterday when reaching for something. Pt has been icing it and the pain is slowly going away. PLOF: owner of restaurant Mechanism of Injury: volleyball Previous Treatment/Compliance: good PMHx/Surgical Hx: see chart Work Hx: restaurant owner Living Situation: w/ wife and 3 kids Pt Goals: \"get in better shape\" Barriers: none Motivation: good Substance use: none FABQ Score: see FOTO Cognition: A & O x 4 OBJECTIVE/EXAMINATION Posture: Forward trunk lean Other Observations:  Wearing brace locked in extension Gait and Functional Mobility:  Decreased stance time on L LE 
Palpation: TTP over medial hamstrings Incision: healing normally L knee ROM: 0-100 deg Quad set: good Neurological: Reflexes / Sensations: normal 
Special Tests: NT due to surgery Modality rationale: decrease edema, decrease inflammation and decrease pain to improve the patients ability to ambulate Min Type Additional Details  
 [] Estim: []Att   []Unatt        []TENS instruct []IFC  []Premod   []NMES []Other:  []w/US   []w/ice   []w/heat Position: Location:  
 []  Traction: [] Cervical       []Lumbar 
                     [] Prone          []Supine []Intermittent   []Continuous Lbs: 
[] before manual 
[] after manual 
[]w/heat  
 []  Ultrasound: []Continuous   [] Pulsed at: 
                         []1MHz   []3MHz Location: 
W/cm2:  
 [] Paraffin Location:  
[]w/heat  
 []  Ice     []  Heat 
[]  Ice massage Position: Location:  
 []  Laser 
[]  Other: Position: Location:  
 
 [x]  Vasopneumatic Device Pressure:       [] lo [x] med [] hi  
Temperature: 34 deg- 10' [x] Skin assessment post-treatment:  [x]intact []redness- no adverse reaction 
  []redness  adverse reaction:  
 
15 min Therapeutic Exercise:  [x] See flow sheet :  
Rationale: increase ROM, increase strength and improve coordination to improve the patients ability to ambulate With 
 [] TE 
 [] TA 
 [] neuro 
 [] other: Patient Education: [x] Review HEP [] Progressed/Changed HEP based on:  
[] positioning   [] body mechanics   [] transfers   [] heat/ice application   
[] other:   
 
Other Objective/Functional Measures:  
 
Pain Level (0-10 scale) post treatment: 4 
 
ASSESSMENT/Changes in Function:  
 
[x]  See Plan of Care John Felix, PT, DPT 4/17/2019

## 2019-04-19 ENCOUNTER — HOSPITAL ENCOUNTER (OUTPATIENT)
Dept: PHYSICAL THERAPY | Age: 49
Discharge: HOME | End: 2019-04-19
Payer: COMMERCIAL

## 2019-04-19 PROCEDURE — 97140 MANUAL THERAPY 1/> REGIONS: CPT | Performed by: PHYSICAL THERAPIST

## 2019-04-19 PROCEDURE — 97016 VASOPNEUMATIC DEVICE THERAPY: CPT | Performed by: PHYSICAL THERAPIST

## 2019-04-19 PROCEDURE — 97110 THERAPEUTIC EXERCISES: CPT | Performed by: PHYSICAL THERAPIST

## 2019-04-19 NOTE — PROGRESS NOTES
PT DAILY TREATMENT NOTE 2-15 Patient Name: Kandee Schwab Both Date:2019 : 1970 [x]  Patient  Verified Payor: Murali Mayen / Plan: Hui Farias / Product Type: Jose Flood / In time:  8:25 am  Out time: 9:40 am 
Total Treatment Time (min): 75 Visit #:  2 Treatment Area: Left knee pain [M25.562] SUBJECTIVE Pain Level (0-10 scale): 2 Any medication changes, allergies to medications, adverse drug reactions, diagnosis change, or new procedure performed?: [x] No    [] Yes (see summary sheet for update) Subjective functional status/changes:   [] No changes reported \"I've been doing the exercises at home. The hamstring hurts some. The knee is stiff and sore. \" OBJECTIVE Modality rationale: decrease edema, decrease inflammation and decrease pain to improve the patients ability to bend, squat, and walk. Min Type Additional Details  
  
 [] Estim: []Att   []Unatt    []TENS instruct []IFC  []Premod   []NMES []Other:  []w/US   []w/ice   []w/heat Position: Location:  
  
 []  Traction: [] Cervical       []Lumbar 
                     [] Prone          []Supine []Intermittent   []Continuous Lbs: 
[] before manual 
[] after manual 
[]w/heat  
 []  Ultrasound: []Continuous   [] Pulsed  
                    at: []1MHz   []3MHz Location: 
W/cm2:  
 [] Paraffin Location:  
[]w/heat  
 []  Ice     []  Heat 
[]  Ice massage Position: Location:  
 []  Laser 
[]  Other: Position: Location:  
15 
 [x]  Vasopneumatic Device Pressure:       [] lo [x] med [] hi  
Temperature:   
[x] Skin assessment post-treatment:  [x]intact []redness- no adverse reaction 
  []redness  adverse reaction:  
 
35 min Therapeutic Exercise:  [x] See flow sheet : 
Additions as charted Rationale: increase ROM and increase strength to improve the patients ability to bend, squat, and walk. 15 min Manual Therapy:   
Patella mobs Lydia scar mobs Manual rectus femoris stretch Rationale: decrease pain, increase ROM and increase tissue extensibility  to improve the patients ability to bend, squat, and walk. With 
 [x] TE 
 [] TA 
 [] neuro 
 [] other: Patient Education: [x] Review HEP [] Progressed/Changed HEP based on:  
[] positioning   [] body mechanics   [] transfers   [] heat/ice application   
[] other:   
 
Other Objective/Functional Measures:   
 
Pain Level (0-10 scale) post treatment: 0 
 
ASSESSMENT/Changes in Function:  Tolerated all new exercises well without complaint. Quad tightness noted. Patient will continue to benefit from skilled PT services to modify and progress therapeutic interventions, address functional mobility deficits, address ROM deficits, address strength deficits, analyze and address soft tissue restrictions, analyze and cue movement patterns, analyze and modify body mechanics/ergonomics and assess and modify postural abnormalities to attain remaining goals. []  See Plan of Care 
[]  See progress note/recertification 
[]  See Discharge Summary Progress towards goals / Updated goals: PLAN [x]  Upgrade activities as tolerated     [x]  Continue plan of care 
[]  Update interventions per flow sheet      
[]  Discharge due to:_ 
[]  Other:_ Leo Crain V, PT 4/19/2019

## 2019-04-24 ENCOUNTER — HOSPITAL ENCOUNTER (OUTPATIENT)
Dept: PHYSICAL THERAPY | Age: 49
Discharge: HOME | End: 2019-04-24
Payer: COMMERCIAL

## 2019-04-24 PROCEDURE — 97016 VASOPNEUMATIC DEVICE THERAPY: CPT | Performed by: PHYSICAL THERAPIST

## 2019-04-24 PROCEDURE — 97140 MANUAL THERAPY 1/> REGIONS: CPT | Performed by: PHYSICAL THERAPIST

## 2019-04-24 PROCEDURE — 97110 THERAPEUTIC EXERCISES: CPT | Performed by: PHYSICAL THERAPIST

## 2019-04-24 NOTE — PROGRESS NOTES
PT DAILY TREATMENT NOTE 2-15 Patient Name: Prema Richmond Both Date:2019 : 1970 [x]  Patient  Verified Payor: Kennedi Dural / Plan: Jad Rightbrianna / Product Type: Kristina Willard / In time:  10:30 am  Out time: 11:35 am 
Total Treatment Time (min): 65 Visit #:  3 Treatment Area: Left knee pain [M25.562] SUBJECTIVE Pain Level (0-10 scale): 0 Any medication changes, allergies to medications, adverse drug reactions, diagnosis change, or new procedure performed?: [x] No    [] Yes (see summary sheet for update) Subjective functional status/changes:   [] No changes reported \"It feels better, just tight. \" OBJECTIVE 
+ patella tap test for effusion, but measures = with tape measure Modality rationale: decrease edema, decrease inflammation and decrease pain to improve the patients ability to bend, squat, and walk. Min Type Additional Details  
  
 [] Estim: []Att   []Unatt    []TENS instruct []IFC  []Premod   []NMES []Other:  []w/US   []w/ice   []w/heat Position: Location:  
  
 []  Traction: [] Cervical       []Lumbar 
                     [] Prone          []Supine []Intermittent   []Continuous Lbs: 
[] before manual 
[] after manual 
[]w/heat  
 []  Ultrasound: []Continuous   [] Pulsed  
                    at: []1MHz   []3MHz Location: 
W/cm2:  
 [] Paraffin Location:  
[]w/heat  
 []  Ice     []  Heat 
[]  Ice massage Position: Location:  
 []  Laser 
[]  Other: Position: Location:  
15 
 [x]  Vasopneumatic Device Pressure:       [] lo [x] med [] hi  
Temperature:   
[x] Skin assessment post-treatment:  [x]intact []redness- no adverse reaction 
  []redness  adverse reaction:  
 
35 min Therapeutic Exercise:  [x] See flow sheet : 
Additions as charted Rationale: increase ROM and increase strength to improve the patients ability to bend, squat, and walk. 15 min Manual Therapy:   
Patella mobs Lydia scar mobs Manual rectus femoris stretch Rationale: decrease pain, increase ROM and increase tissue extensibility  to improve the patients ability to bend, squat, and walk. With 
 [x] TE 
 [] TA 
 [] neuro 
 [] other: Patient Education: [x] Review HEP [] Progressed/Changed HEP based on:  
[] positioning   [] body mechanics   [] transfers   [] heat/ice application   
[] other:   
 
Other Objective/Functional Measures:   
 
Pain Level (0-10 scale) post treatment: 0 
 
ASSESSMENT/Changes in Function:  Required cues for proper technique with exercises. Patient will continue to benefit from skilled PT services to modify and progress therapeutic interventions, address functional mobility deficits, address ROM deficits, address strength deficits, analyze and address soft tissue restrictions, analyze and cue movement patterns, analyze and modify body mechanics/ergonomics and assess and modify postural abnormalities to attain remaining goals. []  See Plan of Care 
[]  See progress note/recertification 
[]  See Discharge Summary Progress towards goals / Updated goals: PLAN [x]  Upgrade activities as tolerated     [x]  Continue plan of care 
[]  Update interventions per flow sheet      
[]  Discharge due to:_ 
[]  Other:_ Geoffrey Acosta V, PT 4/24/2019

## 2019-04-26 ENCOUNTER — HOSPITAL ENCOUNTER (OUTPATIENT)
Dept: PHYSICAL THERAPY | Age: 49
Discharge: HOME | End: 2019-04-26
Payer: COMMERCIAL

## 2019-04-26 PROCEDURE — 97140 MANUAL THERAPY 1/> REGIONS: CPT | Performed by: PHYSICAL THERAPIST

## 2019-04-26 PROCEDURE — 97110 THERAPEUTIC EXERCISES: CPT | Performed by: PHYSICAL THERAPIST

## 2019-04-26 PROCEDURE — 97016 VASOPNEUMATIC DEVICE THERAPY: CPT | Performed by: PHYSICAL THERAPIST

## 2019-04-26 NOTE — PROGRESS NOTES
PT DAILY TREATMENT NOTE 2-15 Patient Name: Mamadou Stark Both Date:2019 : 1970 [x]  Patient  Verified Payor: Melani Bay / Plan: Bart Guzman / Product Type: Dana Lux / In time:  9:35 am  Out time: 10:50 am 
Total Treatment Time (min):  75 Visit #:  4 Treatment Area: Left knee pain [M25.562] SUBJECTIVE Pain Level (0-10 scale): 0 Any medication changes, allergies to medications, adverse drug reactions, diagnosis change, or new procedure performed?: [x] No    [] Yes (see summary sheet for update) Subjective functional status/changes:   [] No changes reported \"I've been doing a lot of stairs the past few days. \" OBJECTIVE Mid and supra patella = 
 
AROM:  -1 to 110 degrees (supine) Modality rationale: decrease edema, decrease inflammation and decrease pain to improve the patients ability to bend, squat, and walk. Min Type Additional Details  
  
 [] Estim: []Att   []Unatt    []TENS instruct []IFC  []Premod   []NMES []Other:  []w/US   []w/ice   []w/heat Position: Location:  
  
 []  Traction: [] Cervical       []Lumbar 
                     [] Prone          []Supine []Intermittent   []Continuous Lbs: 
[] before manual 
[] after manual 
[]w/heat  
 []  Ultrasound: []Continuous   [] Pulsed  
                    at: []1MHz   []3MHz Location: 
W/cm2:  
 [] Paraffin Location:  
[]w/heat  
 []  Ice     []  Heat 
[]  Ice massage Position: Location:  
 []  Laser 
[]  Other: Position: Location:  
15 
 [x]  Vasopneumatic Device Pressure:       [] lo [x] med [] hi  
Temperature:   
[x] Skin assessment post-treatment:  [x]intact []redness- no adverse reaction 
  []redness  adverse reaction:  
 
40 min Therapeutic Exercise:  [x] See flow sheet : 
Additions as charted Rationale: increase ROM and increase strength to improve the patients ability to bend, squat, and walk. 15 min Manual Therapy:   
Patella mobs Lydia scar mobs Manual rectus femoris stretch Rationale: decrease pain, increase ROM and increase tissue extensibility  to improve the patients ability to bend, squat, and walk. With 
 [x] TE 
 [] TA 
 [] neuro 
 [] other: Patient Education: [x] Review HEP [] Progressed/Changed HEP based on:  
[] positioning   [] body mechanics   [] transfers   [] heat/ice application   
[] other:   
 
Other Objective/Functional Measures:   
 
Pain Level (0-10 scale) post treatment: 0 
 
ASSESSMENT/Changes in Function:  Hip pain with abduction SLR's today. Increased flexion ROM. Patient will continue to benefit from skilled PT services to modify and progress therapeutic interventions, address functional mobility deficits, address ROM deficits, address strength deficits, analyze and address soft tissue restrictions, analyze and cue movement patterns, analyze and modify body mechanics/ergonomics and assess and modify postural abnormalities to attain remaining goals. []  See Plan of Care 
[]  See progress note/recertification 
[]  See Discharge Summary Progress towards goals / Updated goals: PLAN [x]  Upgrade activities as tolerated     [x]  Continue plan of care 
[]  Update interventions per flow sheet      
[]  Discharge due to:_ 
[]  Other:_ Jessica Alcocer V, PT 4/26/2019

## 2019-04-29 ENCOUNTER — HOSPITAL ENCOUNTER (OUTPATIENT)
Dept: PHYSICAL THERAPY | Age: 49
Discharge: HOME | End: 2019-04-29
Payer: COMMERCIAL

## 2019-04-29 PROCEDURE — 97140 MANUAL THERAPY 1/> REGIONS: CPT | Performed by: PHYSICAL THERAPY ASSISTANT

## 2019-04-29 PROCEDURE — 97110 THERAPEUTIC EXERCISES: CPT | Performed by: PHYSICAL THERAPY ASSISTANT

## 2019-04-29 PROCEDURE — 97016 VASOPNEUMATIC DEVICE THERAPY: CPT | Performed by: PHYSICAL THERAPY ASSISTANT

## 2019-04-29 NOTE — PROGRESS NOTES
PT DAILY TREATMENT NOTE 2-15 Patient Name: Rickey Albert Both Date:2019 : 1970 [x]  Patient  Verified Payor: Eagle Villegas / Plan: Anna Hagen / Product Type: Dustin Nikolay / In time:  11:30 am  Out time: 12:35 pm 
Total Treatment Time (min):  65 Visit #:  3 Treatment Area: Left knee pain [M25.562] SUBJECTIVE Pain Level (0-10 scale): 0 Any medication changes, allergies to medications, adverse drug reactions, diagnosis change, or new procedure performed?: [x] No    [] Yes (see summary sheet for update) Subjective functional status/changes:   [] No changes reported \"I have been doing better and walking more. \" OBJECTIVEMid and supra patella = 
 
AROM:  0 to 115 degrees (supine) Modality rationale: decrease edema, decrease inflammation and decrease pain to improve the patients ability to bend, squat, and walk. Min Type Additional Details  
  
 [] Estim: []Att   []Unatt    []TENS instruct []IFC  []Premod   []NMES []Other:  []w/US   []w/ice   []w/heat Position: Location:  
  
 []  Traction: [] Cervical       []Lumbar 
                     [] Prone          []Supine []Intermittent   []Continuous Lbs: 
[] before manual 
[] after manual 
[]w/heat  
 []  Ultrasound: []Continuous   [] Pulsed  
                    at: []1MHz   []3MHz Location: 
W/cm2:  
 [] Paraffin Location:  
[]w/heat  
 []  Ice     []  Heat 
[]  Ice massage Position: Location:  
 []  Laser 
[]  Other: Position: Location:  
10 
 [x]  Vasopneumatic Device Pressure:       [] lo [x] med [] hi  
Temperature:   
[x] Skin assessment post-treatment:  [x]intact []redness- no adverse reaction 
  []redness  adverse reaction:  
 
40 min Therapeutic Exercise:  [x] See flow sheet : 
Additions as charted Rationale: increase ROM and increase strength to improve the patients ability to bend, squat, and walk. 15 min Manual Therapy:   
Patella mobs Lydia scar mobs Manual rectus femoris stretch Rationale: decrease pain, increase ROM and increase tissue extensibility  to improve the patients ability to bend, squat, and walk. With 
 [x] TE 
 [] TA 
 [] neuro 
 [] other: Patient Education: [x] Review HEP [] Progressed/Changed HEP based on:  
[] positioning   [] body mechanics   [] transfers   [] heat/ice application   
[] other:   
 
Other Objective/Functional Measures:   
 
Pain Level (0-10 scale) post treatment: 0 
 
ASSESSMENT/Changes in Function:  Pt tolerated additional there-ex well today and is challenged w/ table exercises. Pt demonstrates improved knee ROM. Patient will continue to benefit from skilled PT services to modify and progress therapeutic interventions, address functional mobility deficits, address ROM deficits, address strength deficits, analyze and address soft tissue restrictions, analyze and cue movement patterns, analyze and modify body mechanics/ergonomics and assess and modify postural abnormalities to attain remaining goals. []  See Plan of Care 
[]  See progress note/recertification 
[]  See Discharge Summary Progress towards goals / Updated goals: PLAN [x]  Upgrade activities as tolerated     [x]  Continue plan of care 
[]  Update interventions per flow sheet      
[]  Discharge due to:_ 
[]  Other:_   
 
Mauricio Moody, PT, DPT 4/29/2019

## 2019-05-01 ENCOUNTER — HOSPITAL ENCOUNTER (OUTPATIENT)
Dept: PHYSICAL THERAPY | Age: 49
Discharge: HOME | End: 2019-05-01
Payer: COMMERCIAL

## 2019-05-01 PROCEDURE — 97110 THERAPEUTIC EXERCISES: CPT | Performed by: PHYSICAL THERAPIST

## 2019-05-01 PROCEDURE — 97016 VASOPNEUMATIC DEVICE THERAPY: CPT | Performed by: PHYSICAL THERAPIST

## 2019-05-01 PROCEDURE — 97140 MANUAL THERAPY 1/> REGIONS: CPT | Performed by: PHYSICAL THERAPIST

## 2019-05-01 NOTE — PROGRESS NOTES
PT DAILY TREATMENT NOTE 2-15 Patient Name: Jeana Barrett Both Date:2019 : 1970 [x]  Patient  Verified Payor: Nick Smith / Plan: [employer] / Product Type: Maddie Gipson / In time:  10:00 am  Out time: 11:15 pm 
Total Treatment Time (min):  75 Visit #:  6 Treatment Area: Left knee pain [M25.562] SUBJECTIVE Pain Level (0-10 scale): 1 Any medication changes, allergies to medications, adverse drug reactions, diagnosis change, or new procedure performed?: [x] No    [] Yes (see summary sheet for update) Subjective functional status/changes:   [] No changes reported \"It's achy in the back. \" OBJECTIVEMid and supra patella -1/2 cm Tenderness lateral hamstring tendon. Modality rationale: decrease edema, decrease inflammation and decrease pain to improve the patients ability to bend, squat, and walk. Min Type Additional Details  
  
 [] Estim: []Att   []Unatt    []TENS instruct []IFC  []Premod   []NMES []Other:  []w/US   []w/ice   []w/heat Position: Location:  
  
 []  Traction: [] Cervical       []Lumbar 
                     [] Prone          []Supine []Intermittent   []Continuous Lbs: 
[] before manual 
[] after manual 
[]w/heat  
 []  Ultrasound: []Continuous   [] Pulsed  
                    at: []1MHz   []3MHz Location: 
W/cm2:  
 [] Paraffin Location:  
[]w/heat  
 []  Ice     []  Heat 
[]  Ice massage Position: Location:  
 []  Laser 
[]  Other: Position: Location:  
10 
 [x]  Vasopneumatic Device Pressure:       [] lo [x] med [] hi  
Temperature:   
[x] Skin assessment post-treatment:  [x]intact []redness- no adverse reaction 
  []redness  adverse reaction:  
 
45 min Therapeutic Exercise:  [x] See flow sheet : 
Added B leg press and calf raises. Rationale: increase ROM and increase strength to improve the patients ability to bend, squat, and walk. 15 min Manual Therapy:   
Patella mobs Lydia scar mobs Manual rectus femoris stretch Rationale: decrease pain, increase ROM and increase tissue extensibility  to improve the patients ability to bend, squat, and walk. With 
 [x] TE 
 [] TA 
 [] neuro 
 [] other: Patient Education: [x] Review HEP [] Progressed/Changed HEP based on:  
[] positioning   [] body mechanics   [] transfers   [] heat/ice application   
[] other:   
 
Other Objective/Functional Measures:   
 
Pain Level (0-10 scale) post treatment: 0 
 
ASSESSMENT/Changes in Function:  Lateral hamstring irritation noted. Patient will continue to benefit from skilled PT services to modify and progress therapeutic interventions, address functional mobility deficits, address ROM deficits, address strength deficits, analyze and address soft tissue restrictions, analyze and cue movement patterns, analyze and modify body mechanics/ergonomics and assess and modify postural abnormalities to attain remaining goals. []  See Plan of Care 
[]  See progress note/recertification 
[]  See Discharge Summary Progress towards goals / Updated goals: PLAN [x]  Upgrade activities as tolerated     [x]  Continue plan of care 
[]  Update interventions per flow sheet      
[]  Discharge due to:_ 
[]  Other:_ Sanchez Cardozo V, PT  5/1/2019

## 2019-05-06 ENCOUNTER — HOSPITAL ENCOUNTER (OUTPATIENT)
Dept: PHYSICAL THERAPY | Age: 49
Discharge: HOME | End: 2019-05-06
Payer: COMMERCIAL

## 2019-05-06 PROCEDURE — 97140 MANUAL THERAPY 1/> REGIONS: CPT | Performed by: PHYSICAL THERAPY ASSISTANT

## 2019-05-06 PROCEDURE — 97016 VASOPNEUMATIC DEVICE THERAPY: CPT | Performed by: PHYSICAL THERAPY ASSISTANT

## 2019-05-06 PROCEDURE — 97110 THERAPEUTIC EXERCISES: CPT | Performed by: PHYSICAL THERAPY ASSISTANT

## 2019-05-06 NOTE — PROGRESS NOTES
[facility] Physical Therapy [address] [place], [address] [facility] Phone: [phone] Fax: [phone] Progress Note Name: Vianca Chan Both : 1970 MD: Nat Holland MD 
  
 
Treatment Diagnosis: Left knee pain [M25.562] Start of Care: 19 Visits from Start of Care: 7 Missed Visits: 0 Summary of Care: manual therapy, stretching, cardiovascular endurance, there-ex, quad and hip strengthening exercises Objective/Functional Measures:  
Knee ext: 2 deg Knee flex: 120 deg ASSESSMENT/Changes in Function:  Pt has made very good progress w/ PT over the past few weeks. His ROM has improved and demonstrates good quad control. We have avoided hamstrings strengthening exercises due to hamstrings graft for ACL repair. Pt is doing very well for this stage of recovery. Pt will continue to benefit from PT 2 x week. Aidan Felix, PT, DPT  2019  
 
________________________________________________________________________ NOTE TO PHYSICIAN:  Please complete the following and fax to: [facility]: [phone] Swathi Favian Retain this original for your records. If you are unable to process this request in 24 hours, please contact our office. ____ I have read the above report and request that my patient continue therapy with the following changes/special instructions: 
____ I have read the above report and request that my patient be discharged from therapy [de-identified] Signature:_________________ Date:___________Time:__________

## 2019-05-06 NOTE — PROGRESS NOTES
PT DAILY TREATMENT NOTE 2-15 Patient Name: Ryan Rahman Both Date:2019 : 1970 [x]  Patient  Verified Payor: Placido Crump / Plan: Cinthya Montana / Product Type: Kaila Hand / In time:  1:00 pm  Out time: 2:20m Total Treatment Time (min):  80 Visit #:  5 Treatment Area: Left knee pain [M25.562] SUBJECTIVE Pain Level (0-10 scale): 0 Any medication changes, allergies to medications, adverse drug reactions, diagnosis change, or new procedure performed?: [x] No    [] Yes (see summary sheet for update) Subjective functional status/changes:   [] No changes reported \"I feel fine. I go see the surgeon tomorrow morning. \" OBJECTIVEMid and supra patella -1/2 cm Tenderness lateral hamstring tendon. Modality rationale: decrease edema, decrease inflammation and decrease pain to improve the patients ability to bend, squat, and walk. Min Type Additional Details  
  
 [] Estim: []Att   []Unatt    []TENS instruct []IFC  []Premod   []NMES []Other:  []w/US   []w/ice   []w/heat Position: Location:  
  
 []  Traction: [] Cervical       []Lumbar 
                     [] Prone          []Supine []Intermittent   []Continuous Lbs: 
[] before manual 
[] after manual 
[]w/heat  
 []  Ultrasound: []Continuous   [] Pulsed  
                    at: []1MHz   []3MHz Location: 
W/cm2:  
 [] Paraffin Location:  
[]w/heat  
 []  Ice     []  Heat 
[]  Ice massage Position: Location:  
 []  Laser 
[]  Other: Position: Location:  
15 
 [x]  Vasopneumatic Device Pressure:       [] lo [x] med [] hi  
Temperature:   
[x] Skin assessment post-treatment:  [x]intact []redness- no adverse reaction 
  []redness  adverse reaction:  
 
50 min Therapeutic Exercise:  [x] See flow sheet : 
Added B leg press and calf raises. Rationale: increase ROM and increase strength to improve the patients ability to bend, squat, and walk. 15 min Manual Therapy: Patella mobs Lydia scar mobs Manual rectus femoris stretch Rationale: decrease pain, increase ROM and increase tissue extensibility  to improve the patients ability to bend, squat, and walk. With 
 [x] TE 
 [] TA 
 [] neuro 
 [] other: Patient Education: [x] Review HEP [] Progressed/Changed HEP based on:  
[] positioning   [] body mechanics   [] transfers   [] heat/ice application   
[] other:   
 
Other Objective/Functional Measures:  
Knee ext: 2 deg Knee flex: 120 deg Pain Level (0-10 scale) post treatment: 0 
 
ASSESSMENT/Changes in Function:  Pt has made very good progress w/ PT over the past few weeks. His ROM has improved and demonstrates good quad control. We have avoided hamstrings strengthening exercises due to hamstrings graft for ACL repair. Pt is doing very well for this stage of recovery. Pt will continue to benefit from PT 2 x week. Patient will continue to benefit from skilled PT services to modify and progress therapeutic interventions, address functional mobility deficits, address ROM deficits, address strength deficits, analyze and address soft tissue restrictions, analyze and cue movement patterns, analyze and modify body mechanics/ergonomics and assess and modify postural abnormalities to attain remaining goals. []  See Plan of Care [x]  See progress note/recertification 
[]  See Discharge Summary Progress towards goals / Updated goals: 
See progress note PLAN [x]  Upgrade activities as tolerated     [x]  Continue plan of care 
[]  Update interventions per flow sheet      
[]  Discharge due to:_ 
[]  Other:_   
 
Laura Felix , PT, DPT 5/6/2019

## 2019-05-08 ENCOUNTER — HOSPITAL ENCOUNTER (OUTPATIENT)
Dept: PHYSICAL THERAPY | Age: 49
Discharge: HOME | End: 2019-05-08
Payer: COMMERCIAL

## 2019-05-08 PROCEDURE — 97016 VASOPNEUMATIC DEVICE THERAPY: CPT | Performed by: PHYSICAL THERAPY ASSISTANT

## 2019-05-08 PROCEDURE — 97110 THERAPEUTIC EXERCISES: CPT | Performed by: PHYSICAL THERAPY ASSISTANT

## 2019-05-08 PROCEDURE — 97140 MANUAL THERAPY 1/> REGIONS: CPT | Performed by: PHYSICAL THERAPY ASSISTANT

## 2019-05-08 NOTE — PROGRESS NOTES
PT DAILY TREATMENT NOTE 2-15 Patient Name: Aruna Kerr Both Date:2019 : 1970 [x]  Patient  Verified Payor: Claudell Chase / Plan: Gwen Wills / Product Type: Shai Arzola / In time:  10:00 am  Out time: 11:15 am 
Total Treatment Time (min):  75 Visit #:  7 Treatment Area: Left knee pain [M25.562] SUBJECTIVE Pain Level (0-10 scale): 0 Any medication changes, allergies to medications, adverse drug reactions, diagnosis change, or new procedure performed?: [x] No    [] Yes (see summary sheet for update) Subjective functional status/changes:   [] No changes reported \"I went to the MD and he is happy w/ my progress. I am having a little bit of meniscus pain when getting out of the chair yesterday. \" OBJECTIVEMid and supra patella -1/2 cm Tenderness lateral hamstring tendon. Modality rationale: decrease edema, decrease inflammation and decrease pain to improve the patients ability to bend, squat, and walk. Min Type Additional Details  
  
 [] Estim: []Att   []Unatt    []TENS instruct []IFC  []Premod   []NMES []Other:  []w/US   []w/ice   []w/heat Position: Location:  
  
 []  Traction: [] Cervical       []Lumbar 
                     [] Prone          []Supine []Intermittent   []Continuous Lbs: 
[] before manual 
[] after manual 
[]w/heat  
 []  Ultrasound: []Continuous   [] Pulsed  
                    at: []1MHz   []3MHz Location: 
W/cm2:  
 [] Paraffin Location:  
[]w/heat  
 []  Ice     []  Heat 
[]  Ice massage Position: Location:  
 []  Laser 
[]  Other: Position: Location:  
10 
 [x]  Vasopneumatic Device Pressure:       [] lo [x] med [] hi  
Temperature:   
[x] Skin assessment post-treatment:  [x]intact []redness- no adverse reaction 
  []redness  adverse reaction:  
 
50 min Therapeutic Exercise:  [x] See flow sheet : 
Added B leg press and calf raises. Rationale: increase ROM and increase strength to improve the patients ability to bend, squat, and walk. 15 min Manual Therapy:   
Patella mobs Lydia scar mobs Manual rectus femoris stretch Rationale: decrease pain, increase ROM and increase tissue extensibility  to improve the patients ability to bend, squat, and walk. With 
 [x] TE 
 [] TA 
 [] neuro 
 [] other: Patient Education: [x] Review HEP [] Progressed/Changed HEP based on:  
[] positioning   [] body mechanics   [] transfers   [] heat/ice application   
[] other:   
 
Other Objective/Functional Measures:  
NT 
 
Pain Level (0-10 scale) post treatment: 0 
 
ASSESSMENT/Changes in Function:  Pt tolerated there-ex well w/ no pain. Pt demonstrates improved hip and knee mobility. Patient will continue to benefit from skilled PT services to modify and progress therapeutic interventions, address functional mobility deficits, address ROM deficits, address strength deficits, analyze and address soft tissue restrictions, analyze and cue movement patterns, analyze and modify body mechanics/ergonomics and assess and modify postural abnormalities to attain remaining goals. []  See Plan of Care 
[]  See progress note/recertification 
[]  See Discharge Summary Progress towards goals / Updated goals: 
See progress note PLAN [x]  Upgrade activities as tolerated     [x]  Continue plan of care 
[]  Update interventions per flow sheet      
[]  Discharge due to:_ 
[]  Other:_   
 
Elena Sands , PT, DPT 5/8/2019

## 2019-05-13 ENCOUNTER — HOSPITAL ENCOUNTER (OUTPATIENT)
Dept: PHYSICAL THERAPY | Age: 49
Discharge: HOME | End: 2019-05-13
Payer: COMMERCIAL

## 2019-05-13 PROCEDURE — 97110 THERAPEUTIC EXERCISES: CPT | Performed by: PHYSICAL THERAPY ASSISTANT

## 2019-05-13 PROCEDURE — 97140 MANUAL THERAPY 1/> REGIONS: CPT | Performed by: PHYSICAL THERAPY ASSISTANT

## 2019-05-13 PROCEDURE — 97016 VASOPNEUMATIC DEVICE THERAPY: CPT | Performed by: PHYSICAL THERAPY ASSISTANT

## 2019-05-13 NOTE — PROGRESS NOTES
PT DAILY TREATMENT NOTE 2-15 Patient Name: Shilo Cruz Both Date:2019 : 1970 [x]  Patient  Verified Payor: Carmen Morin / Plan: Anny Velez / Product Type: Trudell Mates / In time:  10:00 am  Out time: 11:20 am 
Total Treatment Time (min):  80 Visit #:  4 Treatment Area: Left knee pain [M25.562] SUBJECTIVE Pain Level (0-10 scale): 0 Any medication changes, allergies to medications, adverse drug reactions, diagnosis change, or new procedure performed?: [x] No    [] Yes (see summary sheet for update) Subjective functional status/changes:   [] No changes reported \"I am doing well but was a little sore when I was at a party on Saturday night. I had to shift my weight back and forth because I was feeling hamstrings tightness. \" OBJECTIVEMid and supra patella -1/2 cm Tenderness lateral hamstring tendon. Modality rationale: decrease edema, decrease inflammation and decrease pain to improve the patients ability to bend, squat, and walk. Min Type Additional Details  
  
 [] Estim: []Att   []Unatt    []TENS instruct []IFC  []Premod   []NMES []Other:  []w/US   []w/ice   []w/heat Position: Location:  
  
 []  Traction: [] Cervical       []Lumbar 
                     [] Prone          []Supine []Intermittent   []Continuous Lbs: 
[] before manual 
[] after manual 
[]w/heat  
 []  Ultrasound: []Continuous   [] Pulsed  
                    at: []1MHz   []3MHz Location: 
W/cm2:  
 [] Paraffin Location:  
[]w/heat  
 []  Ice     []  Heat 
[]  Ice massage Position: Location:  
 []  Laser 
[]  Other: Position: Location:  
10 
 [x]  Vasopneumatic Device Pressure:       [] lo [x] med [] hi  
Temperature:   
[x] Skin assessment post-treatment:  [x]intact []redness- no adverse reaction 
  []redness  adverse reaction:  
 
55 min Therapeutic Exercise:  [x] See flow sheet : 
  
 Rationale: increase ROM and increase strength to improve the patients ability to bend, squat, and walk. 15 min Manual Therapy:   
Patella mobs Lydia scar mobs Manual rectus femoris stretch Rationale: decrease pain, increase ROM and increase tissue extensibility  to improve the patients ability to bend, squat, and walk. With 
 [x] TE 
 [] TA 
 [] neuro 
 [] other: Patient Education: [x] Review HEP [] Progressed/Changed HEP based on:  
[] positioning   [] body mechanics   [] transfers   [] heat/ice application   
[] other:   
 
Other Objective/Functional Measures:  
NT 
 
Pain Level (0-10 scale) post treatment: 0 
 
ASSESSMENT/Changes in Function:  Pt tolerated session well but needs constant re-education on positions to avoid. Pt demonstrates improved knee flexion ROM. Patient will continue to benefit from skilled PT services to modify and progress therapeutic interventions, address functional mobility deficits, address ROM deficits, address strength deficits, analyze and address soft tissue restrictions, analyze and cue movement patterns, analyze and modify body mechanics/ergonomics and assess and modify postural abnormalities to attain remaining goals. []  See Plan of Care 
[]  See progress note/recertification 
[]  See Discharge Summary Progress towards goals / Updated goals: 
See progress note PLAN [x]  Upgrade activities as tolerated     [x]  Continue plan of care 
[]  Update interventions per flow sheet      
[]  Discharge due to:_ 
[]  Other:_   
 
Kerri Casillas PT, DPT 5/13/2019

## 2019-05-15 ENCOUNTER — APPOINTMENT (OUTPATIENT)
Dept: PHYSICAL THERAPY | Age: 49
End: 2019-05-15
Payer: COMMERCIAL

## 2019-05-16 ENCOUNTER — HOSPITAL ENCOUNTER (OUTPATIENT)
Dept: PHYSICAL THERAPY | Age: 49
Discharge: HOME | End: 2019-05-16
Payer: COMMERCIAL

## 2019-05-16 PROCEDURE — 97110 THERAPEUTIC EXERCISES: CPT | Performed by: PHYSICAL THERAPY ASSISTANT

## 2019-05-16 PROCEDURE — 97140 MANUAL THERAPY 1/> REGIONS: CPT | Performed by: PHYSICAL THERAPY ASSISTANT

## 2019-05-16 NOTE — PROGRESS NOTES
PT DAILY TREATMENT NOTE 2-15 Patient Name: Michaela Marcum Both Date:2019 : 1970 [x]  Patient  Verified Payor: Neftali Fan / Plan: Rosa Maria  / Product Type: Garnett Frankel / In time:  10:30 am  Out time: 11:40 am 
Total Treatment Time (min):  70 Visit #:  79 Treatment Area: Left knee pain [M25.562] SUBJECTIVE Pain Level (0-10 scale): 0 Any medication changes, allergies to medications, adverse drug reactions, diagnosis change, or new procedure performed?: [x] No    [] Yes (see summary sheet for update) Subjective functional status/changes:   [] No changes reported \"I tweaked my hamstring a little bit this morning when going in to the office but it feels fine now\" OBJECTIVEMid and supra patella -1/2 cm Tenderness lateral hamstring tendon. Modality rationale: decrease edema, decrease inflammation and decrease pain to improve the patients ability to bend, squat, and walk. Min Type Additional Details  
  
 [] Estim: []Att   []Unatt    []TENS instruct []IFC  []Premod   []NMES []Other:  []w/US   []w/ice   []w/heat Position: Location:  
  
 []  Traction: [] Cervical       []Lumbar 
                     [] Prone          []Supine []Intermittent   []Continuous Lbs: 
[] before manual 
[] after manual 
[]w/heat  
 []  Ultrasound: []Continuous   [] Pulsed  
                    at: []1MHz   []3MHz Location: 
W/cm2:  
 [] Paraffin Location:  
[]w/heat  
10 [x]  Ice     []  Heat 
[]  Ice massage Position: supine Location: L knee  
 []  Laser 
[]  Other: Position: Location:  
NT 
 [x]  Vasopneumatic Device Pressure:       [] lo [x] med [] hi  
Temperature:   
[x] Skin assessment post-treatment:  [x]intact []redness- no adverse reaction 
  []redness  adverse reaction:  
 
50 min Therapeutic Exercise:  [x] See flow sheet : 
  
Rationale: increase ROM and increase strength to improve the patients ability to bend, squat, and walk. 10 min Manual Therapy:   
Patella mobs Lydia scar mobs Manual rectus femoris stretch Rationale: decrease pain, increase ROM and increase tissue extensibility  to improve the patients ability to bend, squat, and walk. With 
 [x] TE 
 [] TA 
 [] neuro 
 [] other: Patient Education: [x] Review HEP [] Progressed/Changed HEP based on:  
[] positioning   [] body mechanics   [] transfers   [] heat/ice application   
[] other:   
 
Other Objective/Functional Measures:  
NT 
 
Pain Level (0-10 scale) post treatment: 0 
 
ASSESSMENT/Changes in Function:  Pt continues to progress well w/ knee ROM and quad strength. Will continue to focus on proprioception. Patient will continue to benefit from skilled PT services to modify and progress therapeutic interventions, address functional mobility deficits, address ROM deficits, address strength deficits, analyze and address soft tissue restrictions, analyze and cue movement patterns, analyze and modify body mechanics/ergonomics and assess and modify postural abnormalities to attain remaining goals. []  See Plan of Care 
[]  See progress note/recertification 
[]  See Discharge Summary Progress towards goals / Updated goals: 
See progress note PLAN [x]  Upgrade activities as tolerated     [x]  Continue plan of care 
[]  Update interventions per flow sheet      
[]  Discharge due to:_ 
[]  Other:_   
 
Carmen Houser , PT, DPT 5/16/2019

## 2019-05-20 ENCOUNTER — HOSPITAL ENCOUNTER (OUTPATIENT)
Dept: PHYSICAL THERAPY | Age: 49
Discharge: HOME | End: 2019-05-20
Payer: COMMERCIAL

## 2019-05-20 PROCEDURE — 97110 THERAPEUTIC EXERCISES: CPT | Performed by: PHYSICAL THERAPY ASSISTANT

## 2019-05-20 PROCEDURE — 97140 MANUAL THERAPY 1/> REGIONS: CPT | Performed by: PHYSICAL THERAPY ASSISTANT

## 2019-05-20 PROCEDURE — 97016 VASOPNEUMATIC DEVICE THERAPY: CPT | Performed by: PHYSICAL THERAPY ASSISTANT

## 2019-05-20 NOTE — PROGRESS NOTES
PT DAILY TREATMENT NOTE 2-15 Patient Name: Tacho Paredes Both Date:2019 : 1970 [x]  Patient  Verified Payor: Yamile Deal / Plan: Pinkie Grass / Product Type: Kenya Agee / In time:  10:05 am  Out time: 11:20 am 
Total Treatment Time (min):  75 Visit #:  94 Treatment Area: Left knee pain [M25.562] SUBJECTIVE Pain Level (0-10 scale): 0 Any medication changes, allergies to medications, adverse drug reactions, diagnosis change, or new procedure performed?: [x] No    [] Yes (see summary sheet for update) Subjective functional status/changes:   [] No changes reported \"I felt pretty good this weekend w/ no issues\" OBJECTIVEMid and supra patella -1/2 cm Tenderness lateral hamstring tendon. Modality rationale: decrease edema, decrease inflammation and decrease pain to improve the patients ability to bend, squat, and walk. Min Type Additional Details  
  
 [] Estim: []Att   []Unatt    []TENS instruct []IFC  []Premod   []NMES []Other:  []w/US   []w/ice   []w/heat Position: Location:  
  
 []  Traction: [] Cervical       []Lumbar 
                     [] Prone          []Supine []Intermittent   []Continuous Lbs: 
[] before manual 
[] after manual 
[]w/heat  
 []  Ultrasound: []Continuous   [] Pulsed  
                    at: []1MHz   []3MHz Location: 
W/cm2:  
 [] Paraffin Location:  
[]w/heat NT [x]  Ice     []  Heat 
[]  Ice massage Position: supine Location: L knee  
 []  Laser 
[]  Other: Position: Location:  
10 
 [x]  Vasopneumatic Device Pressure:       [] lo [x] med [] hi  
Temperature: 34 deg [x] Skin assessment post-treatment:  [x]intact []redness- no adverse reaction 
  []redness  adverse reaction:  
 
55 min Therapeutic Exercise:  [x] See flow sheet : 
  
Rationale: increase ROM and increase strength to improve the patients ability to bend, squat, and walk. 10 min Manual Therapy: Patella mobs Lydia scar mobs Manual rectus femoris stretch Rationale: decrease pain, increase ROM and increase tissue extensibility  to improve the patients ability to bend, squat, and walk. With 
 [x] TE 
 [] TA 
 [] neuro 
 [] other: Patient Education: [x] Review HEP [] Progressed/Changed HEP based on:  
[] positioning   [] body mechanics   [] transfers   [] heat/ice application   
[] other:   
 
Other Objective/Functional Measures:  
NT 
 
Pain Level (0-10 scale) post treatment: 0 
 
ASSESSMENT/Changes in Function:  Pt tolerated there-ex well w/ fatigue during squatting activities. Pt demonstrates improved knee flexion ROM. Pt continuing to be pushed w/ proprioceptive exercises. Patient will continue to benefit from skilled PT services to modify and progress therapeutic interventions, address functional mobility deficits, address ROM deficits, address strength deficits, analyze and address soft tissue restrictions, analyze and cue movement patterns, analyze and modify body mechanics/ergonomics and assess and modify postural abnormalities to attain remaining goals. []  See Plan of Care 
[]  See progress note/recertification 
[]  See Discharge Summary Progress towards goals / Updated goals: 
See progress note PLAN [x]  Upgrade activities as tolerated     [x]  Continue plan of care 
[]  Update interventions per flow sheet      
[]  Discharge due to:_ 
[]  Other:_   
 
Wero Virk , PT, DPT 5/20/2019

## 2019-05-22 ENCOUNTER — HOSPITAL ENCOUNTER (OUTPATIENT)
Dept: PHYSICAL THERAPY | Age: 49
Discharge: HOME | End: 2019-05-22
Payer: COMMERCIAL

## 2019-05-22 PROCEDURE — 97110 THERAPEUTIC EXERCISES: CPT | Performed by: PHYSICAL THERAPY ASSISTANT

## 2019-05-22 PROCEDURE — 97016 VASOPNEUMATIC DEVICE THERAPY: CPT | Performed by: PHYSICAL THERAPY ASSISTANT

## 2019-05-22 PROCEDURE — 97140 MANUAL THERAPY 1/> REGIONS: CPT | Performed by: PHYSICAL THERAPY ASSISTANT

## 2019-05-22 NOTE — PROGRESS NOTES
PT DAILY TREATMENT NOTE 2-15    Patient Name: Carolyn Dickson Both  Date:2019  : 1970  [x]  Patient  Verified  Payor: Sandra Schmitt / Plan: Rica Hernandez / Product Type: MASHA /    In time:  10:00 am  Out time: 11:35 am  Total Treatment Time (min):  95  Visit #:  12    Treatment Area: Left knee pain [M25.562]    SUBJECTIVE  Pain Level (0-10 scale): 0  Any medication changes, allergies to medications, adverse drug reactions, diagnosis change, or new procedure performed?: [x] No    [] Yes (see summary sheet for update)  Subjective functional status/changes:   [] No changes reported  \"My kneecap feels a little tight and I'm not sure why. \"    OBJECTIVE  Mid and supra patella -1/2 cm    Tenderness lateral hamstring tendon. Modality rationale: decrease edema, decrease inflammation and decrease pain to improve the patients ability to bend, squat, and walk. Min Type Additional Details       [] Estim: []Att   []Unatt    []TENS instruct                  []IFC  []Premod   []NMES                     []Other:  []w/US   []w/ice   []w/heat  Position:  Location:       []  Traction: [] Cervical       []Lumbar                       [] Prone          []Supine                       []Intermittent   []Continuous Lbs:  [] before manual  [] after manual  []w/heat    []  Ultrasound: []Continuous   [] Pulsed                       at: []1MHz   []3MHz Location:  W/cm2:    [] Paraffin         Location:   []w/heat   NT [x]  Ice     []  Heat  []  Ice massage Position: supine  Location: L knee    []  Laser  []  Other: Position:  Location:   10   [x]  Vasopneumatic Device Pressure:       [] lo [x] med [] hi   Temperature: 34 deg     [x] Skin assessment post-treatment:  [x]intact []redness- no adverse reaction    []redness  adverse reaction:     75 min Therapeutic Exercise:  [x] See flow sheet :     Rationale: increase ROM and increase strength to improve the patients ability to bend, squat, and walk.     10 min Manual Therapy: Patella mobs  Lydia scar mobs  Manual rectus femoris stretch   Rationale: decrease pain, increase ROM and increase tissue extensibility  to improve the patients ability to bend, squat, and walk. With   [x] TE   [] TA   [] neuro   [] other: Patient Education: [x] Review HEP    [] Progressed/Changed HEP based on:   [] positioning   [] body mechanics   [] transfers   [] heat/ice application    [] other:      Other Objective/Functional Measures:   NT    Pain Level (0-10 scale) post treatment: 0    ASSESSMENT/Changes in Function:  Pt tolerated additional weight bearing exercises well. Pt will start hamstrings strengthening exercises next week. Patient will continue to benefit from skilled PT services to modify and progress therapeutic interventions, address functional mobility deficits, address ROM deficits, address strength deficits, analyze and address soft tissue restrictions, analyze and cue movement patterns, analyze and modify body mechanics/ergonomics and assess and modify postural abnormalities to attain remaining goals.      []  See Plan of Care  []  See progress note/recertification  []  See Discharge Summary         Progress towards goals / Updated goals:  See progress note     PLAN  [x]  Upgrade activities as tolerated     [x]  Continue plan of care  []  Update interventions per flow sheet       []  Discharge due to:_  []  Other:_      Steven Ibrahim , PT, DPT 5/22/2019

## 2019-05-29 ENCOUNTER — HOSPITAL ENCOUNTER (OUTPATIENT)
Dept: PHYSICAL THERAPY | Age: 49
Discharge: HOME | End: 2019-05-29
Payer: COMMERCIAL

## 2019-05-29 PROCEDURE — 97140 MANUAL THERAPY 1/> REGIONS: CPT | Performed by: PHYSICAL THERAPY ASSISTANT

## 2019-05-29 PROCEDURE — 97110 THERAPEUTIC EXERCISES: CPT | Performed by: PHYSICAL THERAPY ASSISTANT

## 2019-05-29 NOTE — PROGRESS NOTES
PT DAILY TREATMENT NOTE 2-15    Patient Name: Brando Johnson Both  Date:2019  : 1970  [x]  Patient  Verified  Payor: GEORGES PEREZ / Plan: Mayra Bajwa / Product Type: MASHA /    In time:  10:05 am  Out time: 11:20 am  Total Treatment Time (min):  75  Visit #:  13    Treatment Area: Left knee pain [M25.562]    SUBJECTIVE  Pain Level (0-10 scale): 0  Any medication changes, allergies to medications, adverse drug reactions, diagnosis change, or new procedure performed?: [x] No    [] Yes (see summary sheet for update)  Subjective functional status/changes:   [] No changes reported  \"The back of my leg was sore yesterday and I'm not sure why. \"    OBJECTIVE  Mid and supra patella -1/2 cm    Tenderness lateral hamstring tendon. Modality rationale: decrease edema, decrease inflammation and decrease pain to improve the patients ability to bend, squat, and walk. Min Type Additional Details       [] Estim: []Att   []Unatt    []TENS instruct                  []IFC  []Premod   []NMES                     []Other:  []w/US   []w/ice   []w/heat  Position:  Location:       []  Traction: [] Cervical       []Lumbar                       [] Prone          []Supine                       []Intermittent   []Continuous Lbs:  [] before manual  [] after manual  []w/heat    []  Ultrasound: []Continuous   [] Pulsed                       at: []1MHz   []3MHz Location:  W/cm2:    [] Paraffin         Location:   []w/heat   NT [x]  Ice     []  Heat  []  Ice massage Position: supine  Location: L knee    []  Laser  []  Other: Position:  Location:   NT   []  Vasopneumatic Device Pressure:       [] lo [] med [] hi   Temperature:      [x] Skin assessment post-treatment:  [x]intact []redness- no adverse reaction    []redness  adverse reaction:     65 min Therapeutic Exercise:  [x] See flow sheet :     Rationale: increase ROM and increase strength to improve the patients ability to bend, squat, and walk.     10 min Manual Therapy: Patella mobs  Lydia scar mobs  Manual rectus femoris stretch   Rationale: decrease pain, increase ROM and increase tissue extensibility  to improve the patients ability to bend, squat, and walk. With   [x] TE   [] TA   [] neuro   [] other: Patient Education: [x] Review HEP    [] Progressed/Changed HEP based on:   [] positioning   [] body mechanics   [] transfers   [] heat/ice application    [] other:      Other Objective/Functional Measures:   NT    Pain Level (0-10 scale) post treatment: 0    ASSESSMENT/Changes in Function:  Pt was able to perform all there-ex w/ no pain. Pt demonstrates improved knee flexion ROM. Patient will continue to benefit from skilled PT services to modify and progress therapeutic interventions, address functional mobility deficits, address ROM deficits, address strength deficits, analyze and address soft tissue restrictions, analyze and cue movement patterns, analyze and modify body mechanics/ergonomics and assess and modify postural abnormalities to attain remaining goals.      []  See Plan of Care  []  See progress note/recertification  []  See Discharge Summary         Progress towards goals / Updated goals:  See progress note     PLAN  [x]  Upgrade activities as tolerated     [x]  Continue plan of care  []  Update interventions per flow sheet       []  Discharge due to:_  []  Other:_      Sonia Solis PT, DPT 5/29/2019

## 2019-05-31 ENCOUNTER — HOSPITAL ENCOUNTER (OUTPATIENT)
Dept: PHYSICAL THERAPY | Age: 49
Discharge: HOME | End: 2019-05-31
Payer: COMMERCIAL

## 2019-05-31 PROCEDURE — 97140 MANUAL THERAPY 1/> REGIONS: CPT | Performed by: PHYSICAL THERAPIST

## 2019-05-31 PROCEDURE — 97110 THERAPEUTIC EXERCISES: CPT | Performed by: PHYSICAL THERAPIST

## 2019-05-31 NOTE — PROGRESS NOTES
PT DAILY TREATMENT NOTE 2-15    Patient Name: Arcadio Feliz Both  Date:2019  : 1970  [x]  Patient  Verified  Payor: Farzaneh Owens / Plan: Marilyn Friday / Product Type: MASHA /    In time:  10:05 am  Out time: 10:50 am  Total Treatment Time (min):  45  Visit #:  14    Treatment Area: Left knee pain [M25.562]    SUBJECTIVE  Pain Level (0-10 scale): 0  Any medication changes, allergies to medications, adverse drug reactions, diagnosis change, or new procedure performed?: [x] No    [] Yes (see summary sheet for update)  Subjective functional status/changes:   [] No changes reported  States knee doing well. Has to leave early due to work commitment. OBJECTIVE      Modality rationale: decrease edema, decrease inflammation and decrease pain to improve the patients ability to bend, squat, and walk. Min Type Additional Details       [] Estim: []Att   []Unatt    []TENS instruct                  []IFC  []Premod   []NMES                     []Other:  []w/US   []w/ice   []w/heat  Position:  Location:       []  Traction: [] Cervical       []Lumbar                       [] Prone          []Supine                       []Intermittent   []Continuous Lbs:  [] before manual  [] after manual  []w/heat    []  Ultrasound: []Continuous   [] Pulsed                       at: []1MHz   []3MHz Location:  W/cm2:    [] Paraffin         Location:   []w/heat    [x]  Ice     []  Heat  []  Ice massage Position: supine  Location: L knee    []  Laser  []  Other: Position:  Location:      []  Vasopneumatic Device Pressure:       [] lo [] med [] hi   Temperature:      [x] Skin assessment post-treatment:  [x]intact []redness- no adverse reaction    []redness  adverse reaction:     35 min Therapeutic Exercise:  [x] See flow sheet :     Rationale: increase ROM and increase strength to improve the patients ability to bend, squat, and walk.     10 min Manual Therapy:    Patella mobs  Lydia scar mobs  Manual rectus femoris stretch Rationale: decrease pain, increase ROM and increase tissue extensibility  to improve the patients ability to bend, squat, and walk. With   [x] TE   [] TA   [] neuro   [] other: Patient Education: [x] Review HEP    [] Progressed/Changed HEP based on:   [] positioning   [] body mechanics   [] transfers   [] heat/ice application    [] other:      Other Objective/Functional Measures:   NT    Pain Level (0-10 scale) post treatment: 0    ASSESSMENT/Changes in Function:  Limited activity today due to leaving early. Patient will continue to benefit from skilled PT services to modify and progress therapeutic interventions, address functional mobility deficits, address ROM deficits, address strength deficits, analyze and address soft tissue restrictions, analyze and cue movement patterns, analyze and modify body mechanics/ergonomics and assess and modify postural abnormalities to attain remaining goals.      []  See Plan of Care  []  See progress note/recertification  []  See Discharge Summary         Progress towards goals / Updated goals:  See progress note     PLAN  [x]  Upgrade activities as tolerated     [x]  Continue plan of care  []  Update interventions per flow sheet       []  Discharge due to:_  []  Other:_      John Boggs, PT    5/31/2019

## 2019-06-05 ENCOUNTER — HOSPITAL ENCOUNTER (OUTPATIENT)
Dept: PHYSICAL THERAPY | Age: 49
Discharge: HOME | End: 2019-06-05
Payer: COMMERCIAL

## 2019-06-05 PROCEDURE — 97110 THERAPEUTIC EXERCISES: CPT | Performed by: PHYSICAL THERAPIST

## 2019-06-05 PROCEDURE — 97140 MANUAL THERAPY 1/> REGIONS: CPT | Performed by: PHYSICAL THERAPIST

## 2019-06-05 PROCEDURE — 97016 VASOPNEUMATIC DEVICE THERAPY: CPT | Performed by: PHYSICAL THERAPIST

## 2019-06-05 NOTE — PROGRESS NOTES
PT DAILY TREATMENT NOTE 2-15    Patient Name: Carolyn Dickson Both  Date:2019  : 1970  [x]  Patient  Verified  Payor: Sandra Schmitt / Plan: Rica Hernandez / Product Type: MASHA /    In time:  5:30 pm  Out time: 6:50 pm  Total Treatment Time (min):  80  Visit #:  15    Treatment Area: Left knee pain [M25.562]    SUBJECTIVE  Pain Level (0-10 scale): 0  Any medication changes, allergies to medications, adverse drug reactions, diagnosis change, or new procedure performed?: [x] No    [] Yes (see summary sheet for update)  Subjective functional status/changes:   [] No changes reported  \"I saw the doctor. I forgot to tell you I was going. He said I'm making steady progress. \"    OBJECTIVE    Modality rationale: decrease edema, decrease inflammation and decrease pain to improve the patients ability to bend, squat, and walk. Min Type Additional Details       [] Estim: []Att   []Unatt    []TENS instruct                  []IFC  []Premod   []NMES                     []Other:  []w/US   []w/ice   []w/heat  Position:  Location:       []  Traction: [] Cervical       []Lumbar                       [] Prone          []Supine                       []Intermittent   []Continuous Lbs:  [] before manual  [] after manual  []w/heat    []  Ultrasound: []Continuous   [] Pulsed                       at: []1MHz   []3MHz Location:  W/cm2:    [] Paraffin         Location:   []w/heat    [x]  Ice     []  Heat  []  Ice massage Position: supine  Location: L knee    []  Laser  []  Other: Position:  Location:   10   [x]  Vasopneumatic Device Pressure:       [] lo [x] med [] hi   Temperature: 35 degrees     [x] Skin assessment post-treatment:  [x]intact []redness- no adverse reaction    []redness  adverse reaction:     55 min Therapeutic Exercise:  [x] See flow sheet :  Added hamstring curls   Rationale: increase ROM and increase strength to improve the patients ability to bend, squat, and walk.     10 min Manual Therapy:    Patella mobs  Manual rectus femoris stretch   Rationale: decrease pain, increase ROM and increase tissue extensibility  to improve the patients ability to bend, squat, and walk. With   [x] TE   [] TA   [] neuro   [] other: Patient Education: [x] Review HEP    [] Progressed/Changed HEP based on:   [] positioning   [] body mechanics   [] transfers   [] heat/ice application    [] other:      Other Objective/Functional Measures:   NT    Pain Level (0-10 scale) post treatment: 0    ASSESSMENT/Changes in Function:  Patient progressing. Tolerated hamstring curls without complaint. Patient will continue to benefit from skilled PT services to modify and progress therapeutic interventions, address functional mobility deficits, address ROM deficits, address strength deficits, analyze and address soft tissue restrictions, analyze and cue movement patterns, analyze and modify body mechanics/ergonomics and assess and modify postural abnormalities to attain remaining goals.      []  See Plan of Care  []  See progress note/recertification  []  See Discharge Summary         Progress towards goals / Updated goals:  See progress note     PLAN  [x]  Upgrade activities as tolerated     [x]  Continue plan of care  []  Update interventions per flow sheet       []  Discharge due to:_  []  Other:_      Travis Chaves, PT    6/5/2019

## 2019-06-07 ENCOUNTER — OFFICE VISIT (OUTPATIENT)
Dept: FAMILY MEDICINE CLINIC | Age: 49
End: 2019-06-07

## 2019-06-07 ENCOUNTER — HOSPITAL ENCOUNTER (OUTPATIENT)
Dept: PHYSICAL THERAPY | Age: 49
Discharge: HOME | End: 2019-06-07
Payer: COMMERCIAL

## 2019-06-07 VITALS
SYSTOLIC BLOOD PRESSURE: 138 MMHG | RESPIRATION RATE: 18 BRPM | HEIGHT: 71 IN | TEMPERATURE: 98 F | BODY MASS INDEX: 34.5 KG/M2 | HEART RATE: 84 BPM | OXYGEN SATURATION: 98 % | DIASTOLIC BLOOD PRESSURE: 92 MMHG | WEIGHT: 246.4 LBS

## 2019-06-07 DIAGNOSIS — I10 HTN, GOAL BELOW 140/90: ICD-10-CM

## 2019-06-07 DIAGNOSIS — Z12.5 PROSTATE CANCER SCREENING: ICD-10-CM

## 2019-06-07 DIAGNOSIS — Z00.00 ROUTINE GENERAL MEDICAL EXAMINATION AT HEALTH CARE FACILITY: Primary | ICD-10-CM

## 2019-06-07 DIAGNOSIS — E78.5 HYPERLIPIDEMIA, UNSPECIFIED HYPERLIPIDEMIA TYPE: ICD-10-CM

## 2019-06-07 DIAGNOSIS — Z23 ENCOUNTER FOR IMMUNIZATION: ICD-10-CM

## 2019-06-07 DIAGNOSIS — Z13.1 SCREENING FOR DIABETES MELLITUS: ICD-10-CM

## 2019-06-07 PROCEDURE — 97140 MANUAL THERAPY 1/> REGIONS: CPT | Performed by: PHYSICAL THERAPIST

## 2019-06-07 PROCEDURE — 97110 THERAPEUTIC EXERCISES: CPT | Performed by: PHYSICAL THERAPIST

## 2019-06-07 RX ORDER — CITALOPRAM 40 MG/1
40 TABLET, FILM COATED ORAL DAILY
Qty: 90 TAB | Refills: 0 | Status: SHIPPED | OUTPATIENT
Start: 2019-06-07 | End: 2019-07-02 | Stop reason: SDUPTHER

## 2019-06-07 RX ORDER — IRBESARTAN 300 MG/1
300 TABLET ORAL DAILY
Qty: 90 TAB | Refills: 0 | Status: SHIPPED | OUTPATIENT
Start: 2019-06-07 | End: 2019-12-09 | Stop reason: SDUPTHER

## 2019-06-07 NOTE — PROGRESS NOTES
Chief Complaint   Patient presents with    New Patient    Complete Physical     not fasting      1. Have you been to the ER, urgent care clinic since your last visit? Hospitalized since your last visit? No    2. Have you seen or consulted any other health care providers outside of the [address] [place] [address] since your last visit? Include any pap smears or colon screening.  No

## 2019-06-07 NOTE — PATIENT INSTRUCTIONS
Well Visit, Ages 25 to 48: Care Instructions Your Care Instructions Physical exams can help you stay healthy. Your doctor has checked your overall health and may have suggested ways to take good care of yourself. He or she also may have recommended tests. At home, you can help prevent illness with healthy eating, regular exercise, and other steps. Follow-up care is a key part of your treatment and safety. Be sure to make and go to all appointments, and call your doctor if you are having problems. It's also a good idea to know your test results and keep a list of the medicines you take. How can you care for yourself at home? · Reach and stay at a healthy weight. This will lower your risk for many problems, such as obesity, diabetes, heart disease, and high blood pressure. · Get at least 30 minutes of physical activity on most days of the week. Walking is a good choice. You also may want to do other activities, such as running, swimming, cycling, or playing tennis or team sports. Discuss any changes in your exercise program with your doctor. · Do not smoke or allow others to smoke around you. If you need help quitting, talk to your doctor about stop-smoking programs and medicines. These can increase your chances of quitting for good. · Talk to your doctor about whether you have any risk factors for sexually transmitted infections (STIs). Having one sex partner (who does not have STIs and does not have sex with anyone else) is a good way to avoid these infections. · Use birth control if you do not want to have children at this time. Talk with your doctor about the choices available and what might be best for you. · Protect your skin from too much sun. When you're outdoors from 10 a.m. to 4 p.m., stay in the shade or cover up with clothing and a hat with a wide brim. Wear sunglasses that block UV rays. Even when it's cloudy, put broad-spectrum sunscreen (SPF 30 or higher) on any exposed skin. · See a dentist one or two times a year for checkups and to have your teeth cleaned. · Wear a seat belt in the car. · Drink alcohol in moderation, if at all. That means no more than 2 drinks a day for men and 1 drink a day for women. Follow your doctor's advice about when to have certain tests. These tests can spot problems early. For everyone · Cholesterol. Have the fat (cholesterol) in your blood tested after age 21. Your doctor will tell you how often to have this done based on your age, family history, or other things that can increase your risk for heart disease. · Blood pressure. Have your blood pressure checked during a routine doctor visit. Your doctor will tell you how often to check your blood pressure based on your age, your blood pressure results, and other factors. · Vision. Talk with your doctor about how often to have a glaucoma test. 
· Diabetes. Ask your doctor whether you should have tests for diabetes. · Colon cancer. Have a test for colon cancer at age 48. You may have one of several tests. If you are younger than 48, you may need a test earlier if you have any risk factors. Risk factors include whether you already had a precancerous polyp removed from your colon or whether your parent, brother, sister, or child has had colon cancer. For women · Breast exam and mammogram. Talk to your doctor about when you should have a clinical breast exam and a mammogram. Medical experts differ on whether and how often women under 50 should have these tests. Your doctor can help you decide what is right for you. · Pap test and pelvic exam. Begin Pap tests at age 24. A Pap test is the best way to find cervical cancer. The test often is part of a pelvic exam. Ask how often to have this test. 
· Tests for sexually transmitted infections (STIs). Ask whether you should have tests for STIs. You may be at risk if you have sex with more than one person, especially if your partners do not wear condoms. For men · Tests for sexually transmitted infections (STIs). Ask whether you should have tests for STIs. You may be at risk if you have sex with more than one person, especially if you do not wear a condom. · Testicular cancer exam. Ask your doctor whether you should check your testicles regularly. · Prostate exam. Talk to your doctor about whether you should have a blood test (called a PSA test) for prostate cancer. Experts differ on whether and when men should have this test. Some experts suggest it if you are older than 39 and are -American or have a father or brother who got prostate cancer when he was younger than 72. When should you call for help? Watch closely for changes in your health, and be sure to contact your doctor if you have any problems or symptoms that concern you. Where can you learn more? Go to http://tariq-lenin.info/. Enter P072 in the search box to learn more about \"Well Visit, Ages 25 to 48: Care Instructions. \" Current as of: March 28, 2018 Content Version: 11.9 © 9723-1488 [employer]. Care instructions adapted under license by [employer]â„¢ (which disclaims liability or warranty for this information). If you have questions about a medical condition or this instruction, always ask your healthcare professional. [place] [address] any warranty or liability for your use of this information. Vaccine Information Statement Td (Tetanus, Diphtheria) Vaccine: What You Need to Know Many Vaccine Information Statements are available in [place]ian and other languages. See www.immunize.org/vis. Hojas de información Sobre Vacunas están disponibles en español y en muchos otros idiomas. Visite Xander.si 1. Why get vaccinated? Tetanus and diphtheria are very serious diseases.   They are rare in the [place] today, but people who do become infected often have severe complications. Td vaccine is used to protect adolescents and adults from both of these diseases. Both diphtheria and tetanus are infections caused by bacteria. Diphtheria spreads from person to person through secretions from coughing or sneezing. Tetanus-causing bacteria enter the body through cuts, scratches, or wounds. TETANUS (Lockjaw) causes painful muscle tightening and stiffness, usually all over the body.  It can lead to tightening of muscles in the head and neck so you cant open your mouth, swallow, or sometimes even breathe. Tetanus kills about 1 out of every 10 people who are infected even after receiving the best medical care. DIPHTHERIA can cause a thick coating to form in the back of the throat.  It can lead to breathing problems, heart failure, paralysis, and death. Before vaccines, as many as 200,000 cases of diphtheria and hundreds of cases of tetanus were reported in the [place] each year. Since vaccination began, reports of cases for both diseases have dropped by about 99%. 2. Td vaccine Td vaccine can protect adolescents and adults from tetanus and diphtheria. Td is usually given as a booster dose every 10 years but it can also be given earlier after a severe and dirty wound or burn. Another vaccine, called Tdap, which protects against pertussis in addition to tetanus and diphtheria, is sometimes recommended instead of Td vaccine. Your doctor or the person giving you the vaccine can give you more information. Td may safely be given at the same time as other vaccines. 3. Some people should not get this vaccine  A person who has ever had a life-threatening allergic reaction after a previous dose of any tetanus or diphtheria containing vaccine, OR has a severe allergy to any part of this vaccine, should not get Td vaccine. Tell the person giving the vaccine about any severe allergies.  Talk to your doctor if you: o had severe pain or swelling after any vaccine containing diphtheria or tetanus,  
o ever had a condition called Guillain Barré Syndrome (GBS), 
o arent feeling well on the day the shot is scheduled. 4. Risks of a vaccine reaction With any medicine, including vaccines, there is a chance of side effects. These are usually mild and go away on their own. Serious reactions are also possible but are rare. Most people who get Td vaccine do not have any problems with it. Mild Problems following Td vaccine: 
(Did not interfere with activities)  Pain where the shot was given (about 8 people in 10)  Redness or swelling where the shot was given (about 1 person in 4)  Mild fever (rare)  Headache  (about 1 person in 4)  Tiredness (about 1 person in 4) Moderate Problems following Td vaccine: 
(Interfered with activities, but did not require medical attention)  Fever over 102°F (rare) Severe Problems following Td vaccine: 
(Unable to perform usual activities; required medical attention)  Swelling, severe pain, bleeding and/or redness in the arm where the shot was given (rare). Problems that could happen after any vaccine:  People sometimes faint after a medical procedure, including vaccination. Sitting or lying down for about 15 minutes can help prevent fainting, and injuries caused by a fall. Tell your doctor if you feel dizzy, or have vision changes or ringing in the ears.  Some people get severe pain in the shoulder and have difficulty moving the arm where a shot was given. This happens very rarely.  Any medication can cause a severe allergic reaction. Such reactions from a vaccine are very rare, estimated at fewer than 1 in a million doses, and would happen within a few minutes to a few hours after the vaccination. As with any medicine, there is a very remote chance of a vaccine causing a serious injury or death. The safety of vaccines is always being monitored. For more information, visit: www.cdc.gov/vaccinesafety/ 
 
 
5. What if there is a serious reaction? What should I look for?  Look for anything that concerns you, such as signs of a severe allergic reaction, very high fever, or unusual behavior. Signs of a severe allergic reaction can include hives, swelling of the face and throat, difficulty breathing, a fast heartbeat, dizziness, and weakness. These would usually start a few minutes to a few hours after the vaccination. What should I do?  If you think it is a severe allergic reaction or other emergency that cant wait, call 9-1-1 or get the person to the nearest hospital. Otherwise, call your doctor.  Afterward, the reaction should be reported to the Vaccine Adverse Event Reporting System (VAERS). Your doctor might file this report, or you can do it yourself through the VAERS web site at www.vaers. [facility].gov, or by calling 1-967.506.1769. VAERS does not give medical advice. 6. The National Vaccine Injury Compensation Program 
 
The [facility] Vaccine Injury Compensation Program (VICP) is a federal program that was created to compensate people who may have been injured by certain vaccines. Persons who believe they may have been injured by a vaccine can learn about the program and about filing a claim by calling 1-890.616.9178 or visiting the [facility] [employer] website at www.[facility].gov/vaccinecompensation. There is a time limit to file a claim for compensation. 7. How can I learn more?  Ask your doctor. He or she can give you the vaccine package insert or suggest other sources of information.  Call your local or state health department.  Contact the Centers for Disease Control and Prevention (CDC): 
- Call 4-663.803.3665 (1-800-CDC-INFO) or 
- Visit CDCs website at www.cdc.gov/vaccines Vaccine Information Statement Td Vaccine 
(4/11/2017) [address] [facility] Sample 283VR-13 Department of Health and [employer] Centers for Disease Control and Prevention Office Use Only

## 2019-06-07 NOTE — PROGRESS NOTES
Patient Name: Cherelle Garcia   MRN: [id number]    Angel Francis is a 54 y.o. male who presents with the following: Here to establish care with new PCP. PSA: no fhx of prostate or personal hx of prostate issues; pt would like PSA    CAD risk factors:  HTN: on meds  BP Readings from Last 3 Encounters:   06/07/19 (!) 138/92   03/28/19 132/84   10/09/11 122/58     Lipid: on statin  DM: due    Occasional abdominal discomfort in the left mid abdominal area. No changes in BM, weight loss, fhx of GI disorders. Review of Systems   Constitutional: Negative for chills, fever, malaise/fatigue and weight loss. HENT: Negative for hearing loss, nosebleeds and sore throat. Respiratory: Negative for cough, sputum production, shortness of breath and wheezing. Cardiovascular: Negative for chest pain, palpitations, leg swelling and PND. Gastrointestinal: Positive for abdominal pain. Negative for blood in stool, constipation, diarrhea, nausea and vomiting. Genitourinary: Negative for dysuria, frequency and urgency. Musculoskeletal: Negative for back pain, falls, joint pain, myalgias and neck pain. Skin: Negative for itching and rash. Neurological: Negative for dizziness, sensory change, focal weakness and loss of consciousness. Psychiatric/Behavioral: Negative for depression. The patient is not nervous/anxious. All other systems reviewed and are negative. The patient's medications, allergies, past medical history, surgical history, family history and social history were reviewed and updated where appropriate. Prior to Admission medications    Medication Sig Start Date End Date Taking? Authorizing Provider   aspirin delayed-release 81 mg tablet Take 4 Tabs by mouth two (2) times a day. Patient taking differently: Take 81 mg by mouth daily. 3/28/19  Yes Loli Velazquez MD   irbesartan (AVAPRO) 300 mg tablet Take 300 mg by mouth daily.    Yes Provider, Historical   atorvastatin (LIPITOR) 20 mg tablet Take 20 mg by mouth daily. Yes Provider, Historical   mometasone (NASONEX) 50 mcg/actuation nasal spray 2 Sprays by Both Nostrils route daily. Yes Provider, Historical   loratadine (CLARITIN) 10 mg tablet Take 10 mg by mouth daily. Yes Provider, Historical   citalopram (CELEXA) 40 mg tablet Take 40 mg by mouth daily.    Yes Other, MD Pedro       Allergies   Allergen Reactions    Other Food Other (comments)     PEANUTS: NAUSEA AND UPSET STOMACH    Avapro [Irbesartan] Other (comments)     PT STATES THIS IS NOT AN ALLERGY         Past Medical History:   Diagnosis Date    Adverse effect of anesthesia     TAKES A LONG TIME TO WAKE UP    Anxiety     Arthritis     Complex tear of medial meniscus of left knee 3/28/2019    GERD (gastroesophageal reflux disease)     HTN, goal below 140/90 10/09/2011    Hyperlipidemia 10/9/2011    Lipoma 10/9/2011    Sleep apnea     CPAP        Past Surgical History:   Procedure Laterality Date    HX ORTHOPAEDIC Left 03/28/2019    ACL replaced       Family History   Problem Relation Age of Onset    Cancer Mother         BREAST    Diabetes Mother         type 2    Hypertension Mother     Hypertension Father     Heart Attack Father         64    No Known Problems Sister     Deep Vein Thrombosis Brother     Deep Vein Thrombosis Other     Anesth Problems Neg Hx        Social History     Socioeconomic History    Marital status:      Spouse name: Not on file    Number of children: Not on file    Years of education: Not on file    Highest education level: Not on file   Occupational History    Not on file   Social Needs    Financial resource strain: Not on file    Food insecurity:     Worry: Not on file     Inability: Not on file    Transportation needs:     Medical: Not on file     Non-medical: Not on file   Tobacco Use    Smoking status: Never Smoker    Smokeless tobacco: Never Used   Substance and Sexual Activity    Alcohol use: Yes     Comment: 1-2 drinks a month    Drug use: Never    Sexual activity: Yes     Partners: Female   Lifestyle    Physical activity:     Days per week: Not on file     Minutes per session: Not on file    Stress: Not on file   Relationships    Social connections:     Talks on phone: Not on file     Gets together: Not on file     Attends Islam service: Not on file     Active member of club or organization: Not on file     Attends meetings of clubs or organizations: Not on file     Relationship status: Not on file    Intimate partner violence:     Fear of current or ex partner: Not on file     Emotionally abused: Not on file     Physically abused: Not on file     Forced sexual activity: Not on file   Other Topics Concern    Not on file   Social History Narrative    Not on file         OBJECTIVE    Visit Vitals  BP (!) 138/92 (BP 1 Location: Left arm, BP Patient Position: Sitting)   Pulse 84   Temp 98 °F (36.7 °C) (Oral)   Resp 18   Ht 5' 11\" (1.803 m)   Wt 246 lb 6.4 oz (111.8 kg)   SpO2 98%   BMI 34.37 kg/m²       Physical Exam   Constitutional: He is oriented to person, place, and time and well-developed, well-nourished, and in no distress. No distress. HENT:   Head: Normocephalic. Right Ear: External ear normal.   Left Ear: External ear normal.   Eyes: Pupils are equal, round, and reactive to light. Conjunctivae and EOM are normal.   Cardiovascular: Normal rate, regular rhythm, normal heart sounds and intact distal pulses. Exam reveals no gallop and no friction rub. No murmur heard. Pulmonary/Chest: Effort normal and breath sounds normal. No respiratory distress. He has no wheezes. He has no rales. Abdominal: Soft. Bowel sounds are normal. He exhibits no distension. There is no tenderness. There is no rebound and no guarding. diastasis recti   Neurological: He is alert and oriented to person, place, and time. Skin: Skin is warm and dry. He is not diaphoretic.    Psychiatric: Memory, affect and judgment normal.         ASSESSMENT AND PLAN  Louise Weston is a 54 y.o. male who presents today for:    1. Routine general medical examination at health care facility  Reviewed age appropriate screening tests as recommended by the [place] Preventive Services Database with patient today. I have reviewed/discussed the above normal BMI with the patient. I have recommended the following interventions: dietary management education, guidance, and counseling, encourage exercise, monitor weight and prescribed dietary intake. 2. Hyperlipidemia, unspecified hyperlipidemia type  Will calculate ASCVD risk score pending labs. - METABOLIC PANEL, COMPREHENSIVE  - LIPID PANEL    3. Screening for diabetes mellitus  - HEMOGLOBIN A1C WITH EAG    4. Prostate cancer screening  - PSA, DIAGNOSTIC (PROSTATE SPECIFIC AG)    5. HTN, goal below 140/90  Mildly high but continue current meds. 6. Encounter for immunization  - [place] IMMUNIZ ADMIN,1 SINGLE/COMB VAC/TOXOID  - TETANUS AND DIPHTHERIA TOXOIDS (TD) ADSORBED, PRES. FREE, IN INDIVIDS. >=7, IM         Medications Discontinued During This Encounter   Medication Reason    irbesartan (AVAPRO) 300 mg tablet Reorder    citalopram (CELEXA) 40 mg tablet Reorder       Follow-up and Dispositions    · Return in about 3 months (around 9/7/2019) for HTN follow up. Medication risks/benefits/costs/interactions/alternatives discussed with patient. Advised patient to call back or return to office if symptoms worsen/change/persist. If patient cannot reach us or should anything more severe/urgent arise he/she should proceed directly to the nearest emergency department. Discussed expected course/resolution/complications of diagnosis in detail with patient. Patient given a written after visit summary which includes his/her diagnoses, current medications and vitals. Patient expressed understanding with the diagnosis and plan. Sara Marx M.D.

## 2019-06-17 ENCOUNTER — HOSPITAL ENCOUNTER (OUTPATIENT)
Dept: PHYSICAL THERAPY | Age: 49
Discharge: HOME | End: 2019-06-17
Payer: COMMERCIAL

## 2019-06-17 PROCEDURE — 97140 MANUAL THERAPY 1/> REGIONS: CPT | Performed by: PHYSICAL THERAPY ASSISTANT

## 2019-06-17 PROCEDURE — 97016 VASOPNEUMATIC DEVICE THERAPY: CPT | Performed by: PHYSICAL THERAPY ASSISTANT

## 2019-06-17 PROCEDURE — 97110 THERAPEUTIC EXERCISES: CPT | Performed by: PHYSICAL THERAPY ASSISTANT

## 2019-06-17 NOTE — PROGRESS NOTES
PT DAILY TREATMENT NOTE 2-15    Patient Name: Erlin Arellano Both  Date:2019  : 1970  [x]  Patient  Verified  Payor: Eagle Villegas / Plan: Anna Hagen / Product Type: MASHA /    In time:  12:20 pm  Out time: 1:40 pm  Total Treatment Time (min):  80  Visit #:  17    Treatment Area: Left knee pain [M25.562]    SUBJECTIVE  Pain Level (0-10 scale): 0  Any medication changes, allergies to medications, adverse drug reactions, diagnosis change, or new procedure performed?: [x] No    [] Yes (see summary sheet for update)  Subjective functional status/changes:   [] No changes reported  \"My knee felt great over the past week while I was on vacation. \"    OBJECTIVE    Modality rationale: decrease edema, decrease inflammation and decrease pain to improve the patients ability to bend, squat, and walk. Min Type Additional Details       [] Estim: []Att   []Unatt    []TENS instruct                  []IFC  []Premod   []NMES                     []Other:  []w/US   []w/ice   []w/heat  Position:  Location:       []  Traction: [] Cervical       []Lumbar                       [] Prone          []Supine                       []Intermittent   []Continuous Lbs:  [] before manual  [] after manual  []w/heat    []  Ultrasound: []Continuous   [] Pulsed                       at: []1MHz   []3MHz Location:  W/cm2:    [] Paraffin         Location:   []w/heat    [x]  Ice     []  Heat  []  Ice massage Position: supine  Location: L knee    []  Laser  []  Other: Position:  Location:   10   [x]  Vasopneumatic Device Pressure:       [] lo [x] med [] hi   Temperature: 35 degrees     [x] Skin assessment post-treatment:  [x]intact []redness- no adverse reaction    []redness  adverse reaction:     60 min Therapeutic Exercise:  [x] See flow sheet :     Rationale: increase ROM and increase strength to improve the patients ability to bend, squat, and walk.     10 min Manual Therapy:    Patella mobs  Manual rectus femoris stretch, hamstrings stretch   Rationale: decrease pain, increase ROM and increase tissue extensibility  to improve the patients ability to bend, squat, and walk. With   [x] TE   [] TA   [] neuro   [] other: Patient Education: [x] Review HEP    [] Progressed/Changed HEP based on:   [] positioning   [] body mechanics   [] transfers   [] heat/ice application    [] other:      Other Objective/Functional Measures:       Pain Level (0-10 scale) post treatment: 0    ASSESSMENT/Changes in Function:  Pt demonstrates good knee flexion for this stage. Will continue to progress hamstrings strengthening. Patient will continue to benefit from skilled PT services to modify and progress therapeutic interventions, address functional mobility deficits, address ROM deficits, address strength deficits, analyze and address soft tissue restrictions, analyze and cue movement patterns, analyze and modify body mechanics/ergonomics and assess and modify postural abnormalities to attain remaining goals.      []  See Plan of Care  []  See progress note/recertification  []  See Discharge Summary         Progress towards goals / Updated goals:  NT    PLAN  [x]  Upgrade activities as tolerated     [x]  Continue plan of care  []  Update interventions per flow sheet       []  Discharge due to:_  []  Other:_      Slick Olson, PT, DPT 6/17/2019

## 2019-06-19 ENCOUNTER — HOSPITAL ENCOUNTER (OUTPATIENT)
Dept: PHYSICAL THERAPY | Age: 49
Discharge: HOME | End: 2019-06-19
Payer: COMMERCIAL

## 2019-06-19 PROCEDURE — 97140 MANUAL THERAPY 1/> REGIONS: CPT | Performed by: PHYSICAL THERAPIST

## 2019-06-19 PROCEDURE — 97016 VASOPNEUMATIC DEVICE THERAPY: CPT | Performed by: PHYSICAL THERAPIST

## 2019-06-19 PROCEDURE — 97110 THERAPEUTIC EXERCISES: CPT | Performed by: PHYSICAL THERAPIST

## 2019-06-19 NOTE — PROGRESS NOTES
PT DAILY TREATMENT NOTE 2-15    Patient Name: Lloyd Dunn Both  Date:2019  : 1970  [x]  Patient  Verified  Payor: Murali Mayen / Plan: Hui Farias / Product Type: MASHA /    In time:  10:05 am  Out time: 11:20 am  Total Treatment Time (min):  75  Visit #:  18    Treatment Area: Left knee pain [M25.562]    SUBJECTIVE  Pain Level (0-10 scale): 0  Any medication changes, allergies to medications, adverse drug reactions, diagnosis change, or new procedure performed?: [x] No    [] Yes (see summary sheet for update)  Subjective functional status/changes:   [] No changes reported  \"It's good. \"    OBJECTIVE  Girth:  Mid patella:  =  Supra patella:  -2 cm      Modality rationale: decrease edema, decrease inflammation and decrease pain to improve the patients ability to bend, squat, and walk. Min Type Additional Details       [] Estim: []Att   []Unatt    []TENS instruct                  []IFC  []Premod   []NMES                     []Other:  []w/US   []w/ice   []w/heat  Position:  Location:       []  Traction: [] Cervical       []Lumbar                       [] Prone          []Supine                       []Intermittent   []Continuous Lbs:  [] before manual  [] after manual  []w/heat    []  Ultrasound: []Continuous   [] Pulsed                       at: []1MHz   []3MHz Location:  W/cm2:    [] Paraffin         Location:   []w/heat    [x]  Ice     []  Heat  []  Ice massage Position: supine  Location: L knee    []  Laser  []  Other: Position:  Location:   10   [x]  Vasopneumatic Device Pressure:       [] lo [x] med [] hi   Temperature: 35 degrees     [x] Skin assessment post-treatment:  [x]intact []redness- no adverse reaction    []redness  adverse reaction:     45 min Therapeutic Exercise:  [x] See flow sheet :     Rationale: increase ROM and increase strength to improve the patients ability to bend, squat, and walk.     15 min Manual Therapy:    Patella mobs  Manual rectus femoris stretch  Prone quad stretch   Rationale: decrease pain, increase ROM and increase tissue extensibility  to improve the patients ability to bend, squat, and walk. With   [x] TE   [] TA   [] neuro   [] other: Patient Education: [x] Review HEP    [] Progressed/Changed HEP based on:   [] positioning   [] body mechanics   [] transfers   [] heat/ice application    [] other:      Other Objective/Functional Measures:     Pain Level (0-10 scale) post treatment: 0    ASSESSMENT/Changes in Function:  Distal quad atrophy noted. Patient will continue to benefit from skilled PT services to modify and progress therapeutic interventions, address functional mobility deficits, address ROM deficits, address strength deficits, analyze and address soft tissue restrictions, analyze and cue movement patterns, analyze and modify body mechanics/ergonomics and assess and modify postural abnormalities to attain remaining goals.      []  See Plan of Care  []  See progress note/recertification  []  See Discharge Summary         Progress towards goals / Updated goals:  NT    PLAN  [x]  Upgrade activities as tolerated     [x]  Continue plan of care  []  Update interventions per flow sheet       []  Discharge due to:_  []  Other:_      John Boggs, PT  6/19/2019

## 2019-06-24 ENCOUNTER — HOSPITAL ENCOUNTER (OUTPATIENT)
Dept: PHYSICAL THERAPY | Age: 49
Discharge: HOME | End: 2019-06-24
Payer: COMMERCIAL

## 2019-06-24 PROCEDURE — 97016 VASOPNEUMATIC DEVICE THERAPY: CPT | Performed by: PHYSICAL THERAPY ASSISTANT

## 2019-06-24 PROCEDURE — 97110 THERAPEUTIC EXERCISES: CPT | Performed by: PHYSICAL THERAPY ASSISTANT

## 2019-06-24 PROCEDURE — 97140 MANUAL THERAPY 1/> REGIONS: CPT | Performed by: PHYSICAL THERAPY ASSISTANT

## 2019-06-24 NOTE — PROGRESS NOTES
PT DAILY TREATMENT NOTE 2-15    Patient Name: Khari Ibarra Both  Date:2019  : 1970  [x]  Patient  Verified  Payor: Alisson Johnson / Plan: Amelia Radford / Product Type: MASHA /    In time:  3:20 pm  Out time: 4:35m  Total Treatment Time (min):  75  Visit #:  19    Treatment Area: Left knee pain [M25.562]    SUBJECTIVE  Pain Level (0-10 scale): 0  Any medication changes, allergies to medications, adverse drug reactions, diagnosis change, or new procedure performed?: [x] No    [] Yes (see summary sheet for update)  Subjective functional status/changes:   [] No changes reported  \"It's a little sore from walking a lot over the weekend\"    OBJECTIVE  Girth:  Mid patella:  =  Supra patella:  -2 cm      Modality rationale: decrease edema, decrease inflammation and decrease pain to improve the patients ability to bend, squat, and walk. Min Type Additional Details       [] Estim: []Att   []Unatt    []TENS instruct                  []IFC  []Premod   []NMES                     []Other:  []w/US   []w/ice   []w/heat  Position:  Location:       []  Traction: [] Cervical       []Lumbar                       [] Prone          []Supine                       []Intermittent   []Continuous Lbs:  [] before manual  [] after manual  []w/heat    []  Ultrasound: []Continuous   [] Pulsed                       at: []1MHz   []3MHz Location:  W/cm2:    [] Paraffin         Location:   []w/heat    [x]  Ice     []  Heat  []  Ice massage Position: supine  Location: L knee    []  Laser  []  Other: Position:  Location:   10   [x]  Vasopneumatic Device Pressure:       [] lo [x] med [] hi   Temperature: 35 degrees     [x] Skin assessment post-treatment:  [x]intact []redness- no adverse reaction    []redness  adverse reaction:     45 min Therapeutic Exercise:  [x] See flow sheet :     Rationale: increase ROM and increase strength to improve the patients ability to bend, squat, and walk.     15 min Manual Therapy:    Patella mobs  Manual rectus femoris stretch  Prone quad stretch   Rationale: decrease pain, increase ROM and increase tissue extensibility  to improve the patients ability to bend, squat, and walk. With   [x] TE   [] TA   [] neuro   [] other: Patient Education: [x] Review HEP    [] Progressed/Changed HEP based on:   [] positioning   [] body mechanics   [] transfers   [] heat/ice application    [] other:      Other Objective/Functional Measures:     Pain Level (0-10 scale) post treatment: 0    ASSESSMENT/Changes in Function:  Improving in dynamic stability and balance. Patient will continue to benefit from skilled PT services to modify and progress therapeutic interventions, address functional mobility deficits, address ROM deficits, address strength deficits, analyze and address soft tissue restrictions, analyze and cue movement patterns, analyze and modify body mechanics/ergonomics and assess and modify postural abnormalities to attain remaining goals.      []  See Plan of Care  []  See progress note/recertification  []  See Discharge Summary         Progress towards goals / Updated goals:  NT    PLAN  [x]  Upgrade activities as tolerated     [x]  Continue plan of care  []  Update interventions per flow sheet       []  Discharge due to:_  []  Other:_      Donovan Spine , PT, DPT 6/24/2019

## 2019-06-26 ENCOUNTER — HOSPITAL ENCOUNTER (OUTPATIENT)
Dept: PHYSICAL THERAPY | Age: 49
Discharge: HOME | End: 2019-06-26
Payer: COMMERCIAL

## 2019-06-26 PROCEDURE — 97140 MANUAL THERAPY 1/> REGIONS: CPT | Performed by: PHYSICAL THERAPIST

## 2019-06-26 PROCEDURE — 97110 THERAPEUTIC EXERCISES: CPT | Performed by: PHYSICAL THERAPIST

## 2019-06-26 NOTE — PROGRESS NOTES
PT PROGRESS NOTE 2-15    Patient Name: Kishore Carpenter Both  Date:2019  : 1970  [x]  Patient  Verified  Payor: Sandra Schmitt / Plan: Rica Hernandez / Product Type: MASHA /    In time:  4:30 pm  Out time: 5:40 pm  Total Treatment Time (min):  70  Visit #:  20    Treatment Area: Left knee pain [M25.562]    SUBJECTIVE  Pain Level (0-10 scale): 0  Any medication changes, allergies to medications, adverse drug reactions, diagnosis change, or new procedure performed?: [x] No    [] Yes (see summary sheet for update)  Subjective functional status/changes:   [] No changes reported  \"I've been on my feet a lot lately. \"    OBJECTIVE  Girth:  Mid patella:  +1/2 cm  Supra patella:  = cm    GIRTH:     Thigh:  -1/2 cm   Calf:  -1 cm    AROM:  +1 to 120 degrees    PROM:  130 degrees    Modality rationale: decrease edema, decrease inflammation and decrease pain to improve the patients ability to bend, squat, and walk.    Min Type Additional Details       [] Estim: []Att   []Unatt    []TENS instruct                  []IFC  []Premod   []NMES                     []Other:  []w/US   []w/ice   []w/heat  Position:  Location:       []  Traction: [] Cervical       []Lumbar                       [] Prone          []Supine                       []Intermittent   []Continuous Lbs:  [] before manual  [] after manual  []w/heat    []  Ultrasound: []Continuous   [] Pulsed                       at: []1MHz   []3MHz Location:  W/cm2:    [] Paraffin         Location:   []w/heat   10 [x]  Ice     []  Heat  []  Ice massage Position: supine  Location: L knee    []  Laser  []  Other: Position:  Location:      [x]  Vasopneumatic Device Pressure:       [] lo [x] med [] hi   Temperature: 35 degrees     [x] Skin assessment post-treatment:  [x]intact []redness- no adverse reaction    []redness  adverse reaction:     40 min Therapeutic Exercise:  [x] See flow sheet :     Rationale: increase ROM and increase strength to improve the patients ability to bend, squat, and walk. 15 min Manual Therapy:    Patella mobs  Manual rectus femoris stretch  Prone quad stretch   Rationale: decrease pain, increase ROM and increase tissue extensibility  to improve the patients ability to bend, squat, and walk. With   [x] TE   [] TA   [] neuro   [] other: Patient Education: [x] Review HEP    [] Progressed/Changed HEP based on:   [] positioning   [] body mechanics   [] transfers   [] heat/ice application    [] other:      Other Objective/Functional Measures:     Pain Level (0-10 scale) post treatment: 0    ASSESSMENT/Changes in Function:  Patient progressing well to date. Presents with increased ROM, improving strength, gait pattern and overall functional activities. Patient will continue to benefit from skilled PT services to modify and progress therapeutic interventions, address functional mobility deficits, address ROM deficits, address strength deficits, analyze and address soft tissue restrictions, analyze and cue movement patterns, analyze and modify body mechanics/ergonomics and assess and modify postural abnormalities to attain remaining goals.      []  See Plan of Care  []  See progress note/recertification  []  See Discharge Summary         Progress towards goals / Updated goals:  Long Term Goals:   Pt will report over 15 point improvement on FOTO  Not measured  Pt will demonstrate 130 degrees in knee flexion ROM  Not met actively, met passively  Pt will be able to jog for 10 minutes w/o pain Not met, unable to do at this time    PLAN  [x]  Upgrade activities as tolerated     [x]  Continue plan of care  []  Update interventions per flow sheet       []  Discharge due to:_  []  Other:_      Campbell Santana, PT    6/26/2019

## 2019-06-27 ENCOUNTER — APPOINTMENT (OUTPATIENT)
Dept: GENERAL RADIOLOGY | Age: 49
End: 2019-06-27
Attending: EMERGENCY MEDICINE
Payer: COMMERCIAL

## 2019-06-27 ENCOUNTER — HOSPITAL ENCOUNTER (EMERGENCY)
Age: 49
Discharge: HOME | End: 2019-06-27
Attending: EMERGENCY MEDICINE
Payer: COMMERCIAL

## 2019-06-27 VITALS
SYSTOLIC BLOOD PRESSURE: 132 MMHG | DIASTOLIC BLOOD PRESSURE: 85 MMHG | HEART RATE: 72 BPM | RESPIRATION RATE: 18 BRPM | TEMPERATURE: 98.6 F | OXYGEN SATURATION: 94 %

## 2019-06-27 DIAGNOSIS — R07.9 ACUTE CHEST PAIN: Primary | ICD-10-CM

## 2019-06-27 DIAGNOSIS — S46.209A INJURY OF BICEPS BRACHII MUSCLE: ICD-10-CM

## 2019-06-27 LAB
ALBUMIN SERPL-MCNC: 4.1 G/DL (ref 3.5–5)
ALBUMIN/GLOB SERPL: 1.4 {RATIO} (ref 1.1–2.2)
ALP SERPL-CCNC: 75 U/L (ref 45–117)
ALT SERPL-CCNC: 63 U/L (ref 12–78)
ANION GAP SERPL CALC-SCNC: 6 MMOL/L (ref 5–15)
AST SERPL-CCNC: 33 U/L (ref 15–37)
BASOPHILS # BLD: 0 K/UL (ref 0–0.1)
BASOPHILS NFR BLD: 0 % (ref 0–1)
BILIRUB SERPL-MCNC: 0.4 MG/DL (ref 0.2–1)
BUN SERPL-MCNC: 20 MG/DL (ref 6–20)
BUN/CREAT SERPL: 14 (ref 12–20)
CALCIUM SERPL-MCNC: 8.9 MG/DL (ref 8.5–10.1)
CHLORIDE SERPL-SCNC: 106 MMOL/L (ref 97–108)
CO2 SERPL-SCNC: 27 MMOL/L (ref 21–32)
COMMENT, HOLDF: NORMAL
CREAT SERPL-MCNC: 1.4 MG/DL (ref 0.7–1.3)
DIFFERENTIAL METHOD BLD: NORMAL
EOSINOPHIL # BLD: 0.2 K/UL (ref 0–0.4)
EOSINOPHIL NFR BLD: 3 % (ref 0–7)
ERYTHROCYTE [DISTWIDTH] IN BLOOD BY AUTOMATED COUNT: 13 % (ref 11.5–14.5)
GLOBULIN SER CALC-MCNC: 3 G/DL (ref 2–4)
GLUCOSE SERPL-MCNC: 120 MG/DL (ref 65–100)
HCT VFR BLD AUTO: 40.3 % (ref 36.6–50.3)
HGB BLD-MCNC: 13.6 G/DL (ref 12.1–17)
IMM GRANULOCYTES # BLD AUTO: 0 K/UL (ref 0–0.04)
IMM GRANULOCYTES NFR BLD AUTO: 0 % (ref 0–0.5)
LYMPHOCYTES # BLD: 1.7 K/UL (ref 0.8–3.5)
LYMPHOCYTES NFR BLD: 24 % (ref 12–49)
MCH RBC QN AUTO: 29.1 PG (ref 26–34)
MCHC RBC AUTO-ENTMCNC: 33.7 G/DL (ref 30–36.5)
MCV RBC AUTO: 86.3 FL (ref 80–99)
MONOCYTES # BLD: 0.5 K/UL (ref 0–1)
MONOCYTES NFR BLD: 7 % (ref 5–13)
NEUTS SEG # BLD: 4.6 K/UL (ref 1.8–8)
NEUTS SEG NFR BLD: 66 % (ref 32–75)
NRBC # BLD: 0 K/UL (ref 0–0.01)
NRBC BLD-RTO: 0 PER 100 WBC
PLATELET # BLD AUTO: 219 K/UL (ref 150–400)
PMV BLD AUTO: 9.3 FL (ref 8.9–12.9)
POTASSIUM SERPL-SCNC: 3.7 MMOL/L (ref 3.5–5.1)
PROT SERPL-MCNC: 7.1 G/DL (ref 6.4–8.2)
RBC # BLD AUTO: 4.67 M/UL (ref 4.1–5.7)
SAMPLES BEING HELD,HOLD: NORMAL
SODIUM SERPL-SCNC: 139 MMOL/L (ref 136–145)
TROPONIN I SERPL-MCNC: <0.05 NG/ML
WBC # BLD AUTO: 6.9 K/UL (ref 4.1–11.1)

## 2019-06-27 PROCEDURE — 71046 X-RAY EXAM CHEST 2 VIEWS: CPT

## 2019-06-27 PROCEDURE — 85025 COMPLETE CBC W/AUTO DIFF WBC: CPT

## 2019-06-27 PROCEDURE — 93005 ELECTROCARDIOGRAM TRACING: CPT

## 2019-06-27 PROCEDURE — 80053 COMPREHEN METABOLIC PANEL: CPT

## 2019-06-27 PROCEDURE — 84484 ASSAY OF TROPONIN QUANT: CPT

## 2019-06-27 PROCEDURE — 99284 EMERGENCY DEPT VISIT MOD MDM: CPT

## 2019-06-27 PROCEDURE — 36415 COLL VENOUS BLD VENIPUNCTURE: CPT

## 2019-06-27 NOTE — ED TRIAGE NOTES
Left arm pain and left sided chest pain while holding a can of paint today, worse with movement, +sob, denies fever, denies diaphoresis, denies n/v

## 2019-06-27 NOTE — DISCHARGE INSTRUCTIONS

## 2019-06-27 NOTE — ED PROVIDER NOTES
54 y.o. male with past medical history significant for hyperlipidemia, lipoma, CPAP, GERD, arthritis, anxiety, HTN, and L knee medial meniscus tear who presents from home with chief complaint of arm pain. Pt reports he was carrying a quart of paint in his L arm for ~5 minutes earlier today. When he set the can of paint down, he immediately began having 6/10 pain in his L bicep radiating to his L forearm, L shoulder, and L chest which has been constant since then. He states his pain is worsened by extending his L elbow. Pt also c/o SOB and diaphoresis since his pain began, noting he did not have either before his pain began. Pt notes he believed his sx may be d/t pinched nerve, but he is concerned about possibility of a cardiac problem. Pt denies hx of DM. There are no other acute medical concerns at this time. Social hx: denies hx of smoking    Significant FMHx: Father: \"emergency heart surgery\" in 52's w/ aortic tear during surgery; did not smoke.      PCP: Pedro Perera MD    Note written by Lupe Gómez, as dictated by Casie William MD 4:51 PM           Past Medical History:   Diagnosis Date    Adverse effect of anesthesia     TAKES A LONG TIME TO WAKE UP    Anxiety     Arthritis     Complex tear of medial meniscus of left knee 3/28/2019    GERD (gastroesophageal reflux disease)     HTN, goal below 140/90 10/09/2011    Hyperlipidemia 10/9/2011    Lipoma 10/9/2011    Sleep apnea     CPAP        Past Surgical History:   Procedure Laterality Date    HX ORTHOPAEDIC Left 03/28/2019    ACL replaced         Family History:   Problem Relation Age of Onset    Cancer Mother         BREAST    Diabetes Mother         type 2    Hypertension Mother     Hypertension Father     Heart Attack Father         64    No Known Problems Sister     Deep Vein Thrombosis Brother     Deep Vein Thrombosis Other     Anesth Problems Neg Hx        Social History     Socioeconomic History    Marital status:      Spouse name: Not on file    Number of children: Not on file    Years of education: Not on file    Highest education level: Not on file   Occupational History    Not on file   Social Needs    Financial resource strain: Not on file    Food insecurity:     Worry: Not on file     Inability: Not on file    Transportation needs:     Medical: Not on file     Non-medical: Not on file   Tobacco Use    Smoking status: Never Smoker    Smokeless tobacco: Never Used   Substance and Sexual Activity    Alcohol use: Yes     Comment: 1-2 drinks a month    Drug use: Never    Sexual activity: Yes     Partners: Female   Lifestyle    Physical activity:     Days per week: Not on file     Minutes per session: Not on file    Stress: Not on file   Relationships    Social connections:     Talks on phone: Not on file     Gets together: Not on file     Attends [employer] service: Not on file     Active member of club or organization: Not on file     Attends meetings of clubs or organizations: Not on file     Relationship status: Not on file    Intimate partner violence:     Fear of current or ex partner: Not on file     Emotionally abused: Not on file     Physically abused: Not on file     Forced sexual activity: Not on file   Other Topics Concern    Not on file   Social History Narrative    Not on file         ALLERGIES: Other food    Review of Systems   Constitutional: Positive for diaphoresis. Negative for appetite change, chills and fever. HENT: Negative for rhinorrhea, sore throat and trouble swallowing. Eyes: Negative for photophobia. Respiratory: Positive for shortness of breath. Negative for cough. Cardiovascular: Positive for chest pain. Negative for palpitations. Gastrointestinal: Negative for abdominal pain, nausea and vomiting. Genitourinary: Negative for dysuria, frequency and hematuria. Musculoskeletal: Positive for myalgias. Negative for arthralgias.    Neurological: Negative for dizziness, syncope and weakness. Psychiatric/Behavioral: Negative for behavioral problems. The patient is not nervous/anxious. All other systems reviewed and are negative. Vitals:    06/27/19 1455   BP: (!) 167/94   Pulse: 85   Resp: 16   Temp: 98.4 °F (36.9 °C)   SpO2: 96%            Physical Exam   Constitutional: He is oriented to person, place, and time. He appears well-developed and well-nourished. No distress. HENT:   Head: Normocephalic and atraumatic. Eyes: Conjunctivae and EOM are normal.   Neck: Normal range of motion. Neck supple. Cardiovascular: Normal rate, regular rhythm and normal heart sounds. Pulmonary/Chest: Effort normal and breath sounds normal. No respiratory distress. Abdominal: Soft. Bowel sounds are normal. He exhibits no distension. There is no tenderness. Musculoskeletal: Normal range of motion. He exhibits tenderness. Tenderness over L distal bicep. Proximal and distal bicep tendons are non-tender. Neurological: He is alert and oriented to person, place, and time. Skin: Skin is warm and dry. Psychiatric: He has a normal mood and affect. His behavior is normal. Judgment and thought content normal.   Nursing note and vitals reviewed. Note written by Lupe Ellington, as dictated by Jeremi Sampson MD 4:51 PM    MDM       Procedures          Note: Patient presents with 5-1/2 hours of constant left upper extremity pain that at times radiates to his neck and chest adjacent to his left shoulder. Extending his elbow increases the pain. The pain started immediately after putting down a quart of paint which he had been carrying around for several minutes in his left hand. Patient's father had a coronary bypass when he was in his 46s. His father was a nonsmoker as is the patient. Patient has elevated triglycerides and hypertension that had been treated with medications.  Initial troponin drawn 5 hours after the pain started as normal computer reads his EKG has nonspecific changes but it appears to be normal except for flattening of the T wave in aVL. Chest x-ray is pending. A chest x-ray is negative patient will be discharged. Symptoms are likely radicular. Kamilla Robert MD  4:55 PM    ED EKG interpretation:  Rhythm: normal sinus rhythm; and regular . Rate (approx.): 85; Axis: normal; P wave: normal; QRS interval: normal ; ST/T wave: non-specific changes; in  Lead: ; Other findings: . This EKG was interpreted by Kamilla Robert MD,ED Provider.  10:01 PM

## 2019-06-28 LAB
ATRIAL RATE: 83 BPM
CALCULATED P AXIS, ECG09: 63 DEGREES
CALCULATED R AXIS, ECG10: 71 DEGREES
CALCULATED T AXIS, ECG11: 69 DEGREES
DIAGNOSIS, 93000: NORMAL
P-R INTERVAL, ECG05: 130 MS
Q-T INTERVAL, ECG07: 348 MS
QRS DURATION, ECG06: 84 MS
QTC CALCULATION (BEZET), ECG08: 408 MS
VENTRICULAR RATE, ECG03: 83 BPM

## 2019-07-02 ENCOUNTER — HOSPITAL ENCOUNTER (OUTPATIENT)
Dept: PHYSICAL THERAPY | Age: 49
Discharge: HOME | End: 2019-07-02
Payer: COMMERCIAL

## 2019-07-02 ENCOUNTER — OFFICE VISIT (OUTPATIENT)
Dept: FAMILY MEDICINE CLINIC | Age: 49
End: 2019-07-02

## 2019-07-02 VITALS
HEIGHT: 71 IN | WEIGHT: 248.6 LBS | HEART RATE: 81 BPM | SYSTOLIC BLOOD PRESSURE: 128 MMHG | TEMPERATURE: 97.7 F | BODY MASS INDEX: 34.8 KG/M2 | RESPIRATION RATE: 18 BRPM | DIASTOLIC BLOOD PRESSURE: 72 MMHG | OXYGEN SATURATION: 96 %

## 2019-07-02 DIAGNOSIS — F41.9 ANXIETY AND DEPRESSION: Primary | ICD-10-CM

## 2019-07-02 DIAGNOSIS — F32.A ANXIETY AND DEPRESSION: Primary | ICD-10-CM

## 2019-07-02 PROCEDURE — 97140 MANUAL THERAPY 1/> REGIONS: CPT | Performed by: PHYSICAL THERAPIST

## 2019-07-02 PROCEDURE — 97110 THERAPEUTIC EXERCISES: CPT | Performed by: PHYSICAL THERAPIST

## 2019-07-02 RX ORDER — CITALOPRAM 20 MG/1
20 TABLET, FILM COATED ORAL DAILY
Qty: 30 TAB | Refills: 2 | Status: SHIPPED | OUTPATIENT
Start: 2019-07-02 | End: 2019-08-02

## 2019-07-02 RX ORDER — ESCITALOPRAM OXALATE 10 MG/1
10 TABLET ORAL DAILY
Qty: 30 TAB | Refills: 2 | Status: SHIPPED | OUTPATIENT
Start: 2019-07-02 | End: 2019-08-02 | Stop reason: SDUPTHER

## 2019-07-02 NOTE — PROGRESS NOTES
PT DAILY TREATMENT NOTE 2-15    Patient Name: Allyson Gutierrez  Date:2019  : 1970  [x]  Patient  Verified  Payor: Ar Jaeger / Plan: Dinora Valle / Product Type: MASHA /    In time:  11:25 am  Out time: 12:30 pm  Total Treatment Time (min):  65  Visit #:  21    Treatment Area: Left knee pain [M25.562]    SUBJECTIVE  Pain Level (0-10 scale): 0  Any medication changes, allergies to medications, adverse drug reactions, diagnosis change, or new procedure performed?: [x] No    [] Yes (see summary sheet for update)  Subjective functional status/changes:   [] No changes reported  \"I saw Dr. Dereje Sullivan, he said I'll be here another month. \"    OBJECTIVE  Girth:  Mid patella:  +1/2 cm  Supra patella:  = cm    Effusion medial aspect left knee    Modality rationale: decrease edema, decrease inflammation and decrease pain to improve the patients ability to bend, squat, and walk. Min Type Additional Details       [] Estim: []Att   []Unatt    []TENS instruct                  []IFC  []Premod   []NMES                     []Other:  []w/US   []w/ice   []w/heat  Position:  Location:       []  Traction: [] Cervical       []Lumbar                       [] Prone          []Supine                       []Intermittent   []Continuous Lbs:  [] before manual  [] after manual  []w/heat    []  Ultrasound: []Continuous   [] Pulsed                       at: []1MHz   []3MHz Location:  W/cm2:    [] Paraffin         Location:   []w/heat    [x]  Ice     []  Heat  []  Ice massage Position: supine  Location: L knee    []  Laser  []  Other: Position:  Location:      [x]  Vasopneumatic Device Pressure:       [] lo [x] med [] hi   Temperature: 35 degrees     [x] Skin assessment post-treatment:  [x]intact []redness- no adverse reaction    []redness  adverse reaction:     45 min Therapeutic Exercise:  [x] See flow sheet :     Rationale: increase ROM and increase strength to improve the patients ability to bend, squat, and walk.     15 min Manual Therapy:    Patella mobs  Manual rectus femoris stretch  Prone quad stretch   Rationale: decrease pain, increase ROM and increase tissue extensibility  to improve the patients ability to bend, squat, and walk. With   [x] TE   [] TA   [] neuro   [] other: Patient Education: [x] Review HEP    [] Progressed/Changed HEP based on:   [] positioning   [] body mechanics   [] transfers   [] heat/ice application    [] other:      Other Objective/Functional Measures:     Pain Level (0-10 scale) post treatment: 0    ASSESSMENT/Changes in Function:  Patient performing all activities without complaint. Patient will continue to benefit from skilled PT services to modify and progress therapeutic interventions, address functional mobility deficits, address ROM deficits, address strength deficits, analyze and address soft tissue restrictions, analyze and cue movement patterns, analyze and modify body mechanics/ergonomics and assess and modify postural abnormalities to attain remaining goals. []  See Plan of Care  []  See progress note/recertification  []  See Discharge Summary         Progress towards goals / Updated goals:  Long Term Goals:   Pt will report over 15 point improvement on FOTO  Not measured  Pt will demonstrate 130 degrees in knee flexion ROM  Not met actively, met passively  Pt will be able to jog for 10 minutes w/o pain Not met, unable to do at this time    PLAN  [x]  Upgrade activities as tolerated     [x]  Continue plan of care  []  Update interventions per flow sheet       []  Discharge due to:_  [x]  Ice on own due to time constraint.     Dwight Bueno V, PT    7/2/2019

## 2019-07-02 NOTE — PATIENT INSTRUCTIONS

## 2019-07-02 NOTE — PROGRESS NOTES
Chief Complaint   Patient presents with    Anxiety     would like to up his anxiety medication (Celexa)      1. Have you been to the ER, urgent care clinic since your last visit? Hospitalized since your last visit? Yes, due to pain in left arm at [facility]- due to tendon pain     2. Have you seen or consulted any other health care providers outside of the [address] [place] [address] since your last visit? Include any pap smears or colon screening.  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

## 2019-07-10 ENCOUNTER — HOSPITAL ENCOUNTER (OUTPATIENT)
Dept: PHYSICAL THERAPY | Age: 49
Discharge: HOME | End: 2019-07-10
Payer: COMMERCIAL

## 2019-07-10 PROCEDURE — 97140 MANUAL THERAPY 1/> REGIONS: CPT | Performed by: PHYSICAL THERAPY ASSISTANT

## 2019-07-10 PROCEDURE — 97110 THERAPEUTIC EXERCISES: CPT | Performed by: PHYSICAL THERAPY ASSISTANT

## 2019-07-10 PROCEDURE — 97014 ELECTRIC STIMULATION THERAPY: CPT | Performed by: PHYSICAL THERAPY ASSISTANT

## 2019-07-10 NOTE — PROGRESS NOTES
PT DAILY TREATMENT NOTE 2-15    Patient Name: Tamra Harris Both  Date:7/10/2019  : 1970  [x]  Patient  Verified  Payor: Siddharth Sánchezs / Plan: Orlene Spray / Product Type: MASHA /    In time:  10:30  am  Out time: 11:40 pm  Total Treatment Time (min): 70  Visit #:  22    Treatment Area: Left knee pain [M25.562]    SUBJECTIVE  Pain Level (0-10 scale): 0  Any medication changes, allergies to medications, adverse drug reactions, diagnosis change, or new procedure performed?: [x] No    [] Yes (see summary sheet for update)  Subjective functional status/changes:   [] No changes reported  \"I feel pretty good and have no issues most days. \"    OBJECTIVE  Girth:  Mid patella:  +1/2 cm  Supra patella:  = cm    Effusion medial aspect left knee    Modality rationale: decrease edema, decrease inflammation and decrease pain to improve the patients ability to bend, squat, and walk. Min Type Additional Details       [] Estim: []Att   []Unatt    []TENS instruct                  []IFC  []Premod   []NMES                     []Other:  []w/US   []w/ice   []w/heat  Position:  Location:       []  Traction: [] Cervical       []Lumbar                       [] Prone          []Supine                       []Intermittent   []Continuous Lbs:  [] before manual  [] after manual  []w/heat    []  Ultrasound: []Continuous   [] Pulsed                       at: []1MHz   []3MHz Location:  W/cm2:    [] Paraffin         Location:   []w/heat    [x]  Ice     []  Heat  []  Ice massage Position: supine  Location: L knee    []  Laser  []  Other: Position:  Location:   10   [x]  Vasopneumatic Device Pressure:       [] lo [x] med [] hi   Temperature: 35 degrees     [x] Skin assessment post-treatment:  [x]intact []redness- no adverse reaction    []redness  adverse reaction:     45 min Therapeutic Exercise:  [x] See flow sheet :     Rationale: increase ROM and increase strength to improve the patients ability to bend, squat, and walk.     15 min Manual Therapy:    Patella mobs  Manual rectus femoris stretch  Prone quad stretch   Rationale: decrease pain, increase ROM and increase tissue extensibility  to improve the patients ability to bend, squat, and walk. With   [x] TE   [] TA   [] neuro   [] other: Patient Education: [x] Review HEP    [] Progressed/Changed HEP based on:   [] positioning   [] body mechanics   [] transfers   [] heat/ice application    [] other:      Other Objective/Functional Measures: NT    Pain Level (0-10 scale) post treatment: 0    ASSESSMENT/Changes in Function:  Pt tolerated there-ex well. Pt remains limited in hamstrings mobility. Patient will continue to benefit from skilled PT services to modify and progress therapeutic interventions, address functional mobility deficits, address ROM deficits, address strength deficits, analyze and address soft tissue restrictions, analyze and cue movement patterns, analyze and modify body mechanics/ergonomics and assess and modify postural abnormalities to attain remaining goals. []  See Plan of Care  []  See progress note/recertification  []  See Discharge Summary         Progress towards goals / Updated goals:  NT    PLAN  [x]  Upgrade activities as tolerated     [x]  Continue plan of care  []  Update interventions per flow sheet       []  Discharge due to:_  [x]  Ice on own due to time constraint.     Delores Felix , PT, DPT, OCS   7/10/2019

## 2019-07-18 ENCOUNTER — APPOINTMENT (OUTPATIENT)
Dept: PHYSICAL THERAPY | Age: 49
End: 2019-07-18
Payer: COMMERCIAL

## 2019-07-22 ENCOUNTER — HOSPITAL ENCOUNTER (OUTPATIENT)
Dept: PHYSICAL THERAPY | Age: 49
Discharge: HOME | End: 2019-07-22
Payer: COMMERCIAL

## 2019-07-22 PROCEDURE — 97016 VASOPNEUMATIC DEVICE THERAPY: CPT | Performed by: PHYSICAL THERAPY ASSISTANT

## 2019-07-22 PROCEDURE — 97110 THERAPEUTIC EXERCISES: CPT | Performed by: PHYSICAL THERAPY ASSISTANT

## 2019-07-22 PROCEDURE — 97140 MANUAL THERAPY 1/> REGIONS: CPT | Performed by: PHYSICAL THERAPY ASSISTANT

## 2019-07-22 NOTE — PROGRESS NOTES
PT DAILY TREATMENT NOTE 2-15    Patient Name: Eulalia Paulino Both  Date:2019  : 1970  [x]  Patient  Verified  Payor: William Bishop / Plan: Seamus Milner / Product Type: MASHA /    In time:  11:00 am  Out time: 12:05 m  Total Treatment Time (min): 65  Visit #:  23    Treatment Area: Left knee pain [M25.562]    SUBJECTIVE  Pain Level (0-10 scale): 0  Any medication changes, allergies to medications, adverse drug reactions, diagnosis change, or new procedure performed?: [x] No    [] Yes (see summary sheet for update)  Subjective functional status/changes:   [] No changes reported  \"I feel good and don't have any issues. \"    OBJECTIVE  Girth:  Mid patella:  +1/2 cm  Supra patella:  = cm    Effusion medial aspect left knee    Modality rationale: decrease edema, decrease inflammation and decrease pain to improve the patients ability to bend, squat, and walk. Min Type Additional Details       [] Estim: []Att   []Unatt    []TENS instruct                  []IFC  []Premod   []NMES                     []Other:  []w/US   []w/ice   []w/heat  Position:  Location:       []  Traction: [] Cervical       []Lumbar                       [] Prone          []Supine                       []Intermittent   []Continuous Lbs:  [] before manual  [] after manual  []w/heat    []  Ultrasound: []Continuous   [] Pulsed                       at: []1MHz   []3MHz Location:  W/cm2:    [] Paraffin         Location:   []w/heat    []  Ice     []  Heat  []  Ice massage Position:   Location:     []  Laser  []  Other: Position:  Location:   10   [x]  Vasopneumatic Device Pressure:       [] lo [x] med [] hi   Temperature: 35 degrees     [x] Skin assessment post-treatment:  [x]intact []redness- no adverse reaction    []redness  adverse reaction:     40 min Therapeutic Exercise:  [x] See flow sheet :     Rationale: increase ROM and increase strength to improve the patients ability to bend, squat, and walk.     15 min Manual Therapy:    Patella mobs  Manual rectus femoris stretch  Prone quad stretch   Rationale: decrease pain, increase ROM and increase tissue extensibility  to improve the patients ability to bend, squat, and walk. With   [x] TE   [] TA   [] neuro   [] other: Patient Education: [x] Review HEP    [] Progressed/Changed HEP based on:   [] positioning   [] body mechanics   [] transfers   [] heat/ice application    [] other:      Other Objective/Functional Measures: NT    Pain Level (0-10 scale) post treatment: 0    ASSESSMENT/Changes in Function:  Pt tolerated there-ex well and continues to improve w/ lateral stepping and knee flexion. Patient will continue to benefit from skilled PT services to modify and progress therapeutic interventions, address functional mobility deficits, address ROM deficits, address strength deficits, analyze and address soft tissue restrictions, analyze and cue movement patterns, analyze and modify body mechanics/ergonomics and assess and modify postural abnormalities to attain remaining goals.      []  See Plan of Care  []  See progress note/recertification  []  See Discharge Summary         Progress towards goals / Updated goals:  NT    PLAN  [x]  Upgrade activities as tolerated     [x]  Continue plan of care  []  Update interventions per flow sheet       []  Discharge due to:_  []      Pari Drake , PT, DPT, OCS   7/22/2019

## 2019-07-24 ENCOUNTER — HOSPITAL ENCOUNTER (OUTPATIENT)
Dept: PHYSICAL THERAPY | Age: 49
Discharge: HOME | End: 2019-07-24
Payer: COMMERCIAL

## 2019-07-24 PROCEDURE — 97140 MANUAL THERAPY 1/> REGIONS: CPT | Performed by: PHYSICAL THERAPY ASSISTANT

## 2019-07-24 PROCEDURE — 97110 THERAPEUTIC EXERCISES: CPT | Performed by: PHYSICAL THERAPY ASSISTANT

## 2019-07-24 PROCEDURE — 97016 VASOPNEUMATIC DEVICE THERAPY: CPT | Performed by: PHYSICAL THERAPY ASSISTANT

## 2019-07-24 NOTE — PROGRESS NOTES
PT DAILY TREATMENT NOTE 2-15    Patient Name: Padmaja Islas Both  Date:2019  : 1970  [x]  Patient  Verified  Payor: Juvenal Godoy / Plan: Patsy Matter / Product Type: MASHA /    In time:  4:30 pm  Out time: 5:45 pm  Total Treatment Time (min): 75  Visit #:  24    Treatment Area: Left knee pain [M25.562]    SUBJECTIVE  Pain Level (0-10 scale): 0  Any medication changes, allergies to medications, adverse drug reactions, diagnosis change, or new procedure performed?: [x] No    [] Yes (see summary sheet for update)  Subjective functional status/changes:   [] No changes reported  \"I'm doing great w/ no pain. \"    OBJECTIVE  Girth:  Mid patella:  +1/2 cm  Supra patella:  = cm    Effusion medial aspect left knee    Modality rationale: decrease edema, decrease inflammation and decrease pain to improve the patients ability to bend, squat, and walk. Min Type Additional Details       [] Estim: []Att   []Unatt    []TENS instruct                  []IFC  []Premod   []NMES                     []Other:  []w/US   []w/ice   []w/heat  Position:  Location:       []  Traction: [] Cervical       []Lumbar                       [] Prone          []Supine                       []Intermittent   []Continuous Lbs:  [] before manual  [] after manual  []w/heat    []  Ultrasound: []Continuous   [] Pulsed                       at: []1MHz   []3MHz Location:  W/cm2:    [] Paraffin         Location:   []w/heat    []  Ice     []  Heat  []  Ice massage Position:   Location:     []  Laser  []  Other: Position:  Location:   10   [x]  Vasopneumatic Device Pressure:       [] lo [x] med [] hi   Temperature: 35 degrees     [x] Skin assessment post-treatment:  [x]intact []redness- no adverse reaction    []redness  adverse reaction:     45 min Therapeutic Exercise:  [x] See flow sheet :     Rationale: increase ROM and increase strength to improve the patients ability to bend, squat, and walk.     15 min Manual Therapy:    Patella mobs  Manual rectus femoris stretch  Prone quad stretch   Rationale: decrease pain, increase ROM and increase tissue extensibility  to improve the patients ability to bend, squat, and walk. With   [x] TE   [] TA   [] neuro   [] other: Patient Education: [x] Review HEP    [] Progressed/Changed HEP based on:   [] positioning   [] body mechanics   [] transfers   [] heat/ice application    [] other:      Other Objective/Functional Measures: NT    Pain Level (0-10 scale) post treatment: 0    ASSESSMENT/Changes in Function:  Pt making good strides in knee ROM and quad strength. Patient will continue to benefit from skilled PT services to modify and progress therapeutic interventions, address functional mobility deficits, address ROM deficits, address strength deficits, analyze and address soft tissue restrictions, analyze and cue movement patterns, analyze and modify body mechanics/ergonomics and assess and modify postural abnormalities to attain remaining goals.      []  See Plan of Care  []  See progress note/recertification  []  See Discharge Summary         Progress towards goals / Updated goals:  NT    PLAN  [x]  Upgrade activities as tolerated     [x]  Continue plan of care  []  Update interventions per flow sheet       []  Discharge due to:_  []      Ai Coleman , PT, DPT, OCS   7/24/2019

## 2019-08-02 ENCOUNTER — OFFICE VISIT (OUTPATIENT)
Dept: FAMILY MEDICINE CLINIC | Age: 49
End: 2019-08-02

## 2019-08-02 VITALS
OXYGEN SATURATION: 98 % | TEMPERATURE: 97.9 F | DIASTOLIC BLOOD PRESSURE: 72 MMHG | SYSTOLIC BLOOD PRESSURE: 134 MMHG | RESPIRATION RATE: 18 BRPM | BODY MASS INDEX: 34.89 KG/M2 | HEIGHT: 71 IN | WEIGHT: 249.2 LBS | HEART RATE: 83 BPM

## 2019-08-02 DIAGNOSIS — F41.9 ANXIETY AND DEPRESSION: ICD-10-CM

## 2019-08-02 DIAGNOSIS — F32.A ANXIETY AND DEPRESSION: ICD-10-CM

## 2019-08-02 RX ORDER — ESCITALOPRAM OXALATE 20 MG/1
20 TABLET ORAL DAILY
Qty: 90 TAB | Refills: 1 | Status: SHIPPED | OUTPATIENT
Start: 2019-08-02 | End: 2020-04-16

## 2019-08-02 NOTE — PROGRESS NOTES
Patient Name: Dylan Gary   MRN: [id number]    Evan Brito is a 54 y.o. male who presents with the following:     Doing well on Lexapro but would like to increase the dose. No side effects. Weaning off of Celexa    Review of Systems   Constitutional: Negative for fever, malaise/fatigue and weight loss. Respiratory: Negative for cough, hemoptysis, shortness of breath and wheezing. Cardiovascular: Negative for chest pain, palpitations, leg swelling and PND. Gastrointestinal: Negative for abdominal pain, constipation, diarrhea, nausea and vomiting. The patient's medications, allergies, past medical history, surgical history, family history and social history were reviewed and updated where appropriate. Prior to Admission medications    Medication Sig Start Date End Date Taking? Authorizing Provider   escitalopram oxalate (LEXAPRO) 10 mg tablet Take 1 Tab by mouth daily. 7/2/19  Yes Rafia Echavarria MD   citalopram (CELEXA) 20 mg tablet Take 1 Tab by mouth daily. 7/2/19  Yes Rafia Echavarria MD   irbesartan (AVAPRO) 300 mg tablet Take 1 Tab by mouth daily. 6/7/19  Yes Rafia Echavarria MD   aspirin delayed-release 81 mg tablet Take 4 Tabs by mouth two (2) times a day. Patient taking differently: Take 81 mg by mouth daily. 3/28/19  Yes Felicia Srivastava MD   atorvastatin (LIPITOR) 20 mg tablet Take 20 mg by mouth daily. Yes Provider, Historical   mometasone (NASONEX) 50 mcg/actuation nasal spray 2 Sprays by Both Nostrils route daily. Yes Provider, Historical   loratadine (CLARITIN) 10 mg tablet Take 10 mg by mouth daily.    Yes Provider, Historical       Allergies   Allergen Reactions    Other Food Other (comments)     PEANUTS: NAUSEA AND UPSET STOMACH           OBJECTIVE    Visit Vitals  /72 (BP 1 Location: Right arm, BP Patient Position: Sitting)   Pulse 83   Temp 97.9 °F (36.6 °C) (Oral)   Resp 18   Ht 5' 11\" (1.803 m)   Wt 249 lb 3.2 oz (113 kg)   SpO2 98%   BMI 34.76 kg/m²       Physical Exam   Constitutional: He is oriented to person, place, and time and well-developed, well-nourished, and in no distress. No distress. Eyes: Pupils are equal, round, and reactive to light. Conjunctivae and EOM are normal.   Musculoskeletal: Normal range of motion. Neurological: He is alert and oriented to person, place, and time. Gait normal.   Skin: Skin is warm and dry. He is not diaphoretic. Psychiatric: Mood, memory, affect and judgment normal.   Nursing note and vitals reviewed. ASSESSMENT AND PLAN  Thi Sagastume is a 54 y.o. male who presents today for:    1. Anxiety and depression  Increase Lexapro to 20 mg and stop Celexa. - escitalopram oxalate (LEXAPRO) 20 mg tablet; Take 1 Tab by mouth daily. Dispense: 90 Tab; Refill: 1       Medications Discontinued During This Encounter   Medication Reason    citalopram (CELEXA) 20 mg tablet Not A Current Medication    escitalopram oxalate (LEXAPRO) 10 mg tablet Reorder       Medication risks/benefits/costs/interactions/alternatives discussed with patient. Advised patient to call back or return to office if symptoms worsen/change/persist. If patient cannot reach us or should anything more severe/urgent arise he/she should proceed directly to the nearest emergency department. Discussed expected course/resolution/complications of diagnosis in detail with patient. Patient given a written after visit summary which includes his/her diagnoses, current medications and vitals. Patient expressed understanding with the diagnosis and plan. Sara Mark M.D.

## 2019-08-02 NOTE — PROGRESS NOTES
Chief Complaint   Patient presents with    Follow-up     med check      1. Have you been to the ER, urgent care clinic since your last visit? Hospitalized since your last visit? No    2. Have you seen or consulted any other health care providers outside of the [address] [place] [address] since your last visit? Include any pap smears or colon screening.  No

## 2019-08-02 NOTE — PATIENT INSTRUCTIONS

## 2019-11-08 ENCOUNTER — TELEPHONE (OUTPATIENT)
Dept: FAMILY MEDICINE CLINIC | Age: 49
End: 2019-11-08

## 2019-11-08 NOTE — TELEPHONE ENCOUNTER
Patient notified that an appointment is required.  Appointment scheduled for 11/9/2019 with Dr. Erika Carig

## 2019-11-08 NOTE — TELEPHONE ENCOUNTER
----- Message from Gregory Daniel sent at 11/8/2019  1:41 PM EST -----  Regarding: Dr. Watkins Promise: [phone]  General Message/Vendor Calls    Caller's first and last name: Oni Hancock Both      Reason for call: He stated he has had really bad congestion and drainage since Sunday 11/03/2019. He stated he wanted to know if an antibiotic could be sent in for him at [facility] on [facility]. He was offered to make an appointment to come in and be seen but declined. Callback required yes/no and why: Yes, to see if the prescription can be sent in for him.       Best contact number(s): [phone]      Gregory Daniel

## 2019-11-09 ENCOUNTER — OFFICE VISIT (OUTPATIENT)
Dept: FAMILY MEDICINE CLINIC | Age: 49
End: 2019-11-09

## 2019-11-09 VITALS
WEIGHT: 240.6 LBS | HEART RATE: 88 BPM | SYSTOLIC BLOOD PRESSURE: 128 MMHG | TEMPERATURE: 98.2 F | BODY MASS INDEX: 33.68 KG/M2 | OXYGEN SATURATION: 98 % | HEIGHT: 71 IN | RESPIRATION RATE: 18 BRPM | DIASTOLIC BLOOD PRESSURE: 84 MMHG

## 2019-11-09 DIAGNOSIS — J06.9 UPPER RESPIRATORY TRACT INFECTION, UNSPECIFIED TYPE: Primary | ICD-10-CM

## 2019-11-09 RX ORDER — AMOXICILLIN AND CLAVULANATE POTASSIUM 875; 125 MG/1; MG/1
1 TABLET, FILM COATED ORAL EVERY 12 HOURS
Qty: 10 TAB | Refills: 0 | Status: SHIPPED | OUTPATIENT
Start: 2019-11-09 | End: 2019-11-14

## 2019-11-09 NOTE — PROGRESS NOTES
Lino Cruz Both  54 y.o. male  1970  [address] [id number]  [id number]     [address] [facility]       Encounter Date: 11/9/2019           Established Patient Visit Note: Rduy Goldstein MD    Reason for Appointment:  Chief Complaint   Patient presents with    Cough     with some congestion in head and chest. Patient has taken Aleve and Claritin without much relief. x 6 days. possible fever. History of Present Illness:  History provided by patient    Iva Villafana is a 54 y.o. male who presents to clinic today for:    URI Symptoms  Duration: 6 days  Drainage down the thraot progressing to cough productive of thick, green sputum  AS:  headache, sujective fever, still has cough and a lot of drainage  Course: intermittent  Sick contacts: reports that 1 week before he got sick his son had similar symptoms and his daughter had vomiting  Recent Travel: denies  Treatment: has has been drinking a lot of fluids, taking alieve, claritin, benadryl at night, not taking any decongestants  Comorbidities: denies any history of asthma or COPD      Review of Systems  Const: denies fatigue, positive for subjective fever, did not check temp, neg for muscle aches, chills  Cardiac: denies palpitations or chest pain  Resp:denies dyspnea, denies wheezing        Allergies: Other food    Medications: (Updated to reflect final medication list after visit)    Current Outpatient Medications:     amoxicillin-clavulanate (AUGMENTIN) 875-125 mg per tablet, Take 1 Tab by mouth every twelve (12) hours for 5 days. , Disp: 10 Tab, Rfl: 0    escitalopram oxalate (LEXAPRO) 20 mg tablet, Take 1 Tab by mouth daily. , Disp: 90 Tab, Rfl: 1    irbesartan (AVAPRO) 300 mg tablet, Take 1 Tab by mouth daily. , Disp: 90 Tab, Rfl: 0    aspirin delayed-release 81 mg tablet, Take 4 Tabs by mouth two (2) times a day.  (Patient taking differently: Take 81 mg by mouth daily.), Disp: 60 Tab, Rfl: 0    atorvastatin (LIPITOR) 20 mg tablet, Take 20 mg by mouth daily. , Disp: , Rfl:     loratadine (CLARITIN) 10 mg tablet, Take 10 mg by mouth daily. , Disp: , Rfl:     History  Past Medical History:   Diagnosis Date    Adverse effect of anesthesia     TAKES A LONG TIME TO WAKE UP    Anxiety     Arthritis     Complex tear of medial meniscus of left knee 3/28/2019    GERD (gastroesophageal reflux disease)     HTN, goal below 140/90 10/09/2011    Hyperlipidemia 10/9/2011    Lipoma 10/9/2011    Sleep apnea     CPAP       Past Surgical History:   Procedure Laterality Date    HX ORTHOPAEDIC Left 03/28/2019    ACL replaced     Family History   Problem Relation Age of Onset    Cancer Mother         BREAST    Diabetes Mother         type 2    Hypertension Mother     Hypertension Father     Heart Attack Father         64    No Known Problems Sister     Deep Vein Thrombosis Brother     Deep Vein Thrombosis Other     Anesth Problems Neg Hx      Social History     Tobacco Use    Smoking status: Never Smoker    Smokeless tobacco: Never Used   Substance Use Topics    Alcohol use: Yes     Comment: 1-2 drinks a month       Health Maintenance  Health Maintenance Due   Topic Date Due    DTaP/Tdap/Td series (1 - Tdap) 06/08/2019    Influenza Age 5 to Adult  08/01/2019       Objective:   Visit Vitals  /84 (BP 1 Location: Right arm, BP Patient Position: Sitting)   Pulse 88   Temp 98.2 °F (36.8 °C) (Oral)   Resp 18   Ht 5' 11\" (1.803 m)   Wt 240 lb 9.6 oz (109.1 kg)   SpO2 98%   BMI 33.56 kg/m²       Physical Exam:  Physical Exam   Constitutional: He appears well-developed and well-nourished. Non-toxic appearance. He does not appear ill. HENT:   Head: Normocephalic and atraumatic. Right Ear: External ear normal.   Left Ear: External ear normal.   Nose: Mucosal edema, rhinorrhea and sinus tenderness present. Right sinus exhibits maxillary sinus tenderness and frontal sinus tenderness.  Left sinus exhibits maxillary sinus tenderness and frontal sinus tenderness. Eyes: Pupils are equal, round, and reactive to light. EOM are normal.   Neck: Normal range of motion. Neck supple. No thyromegaly present. Cardiovascular: Normal rate, regular rhythm and normal heart sounds. Exam reveals no gallop and no friction rub. No murmur heard. Pulmonary/Chest: Effort normal and breath sounds normal. No stridor. No respiratory distress. He has no wheezes. He has no rhonchi. He has no rales. Abdominal: Normal appearance. He exhibits no shifting dullness, no distension, no fluid wave, no ascites and no mass. There is no hepatosplenomegaly. There is no tenderness. There is no rigidity, no rebound, no guarding, no CVA tenderness and no tenderness at McBurney's point. Lymphadenopathy:     He has cervical adenopathy (mild). Psychiatric: He has a normal mood and affect. His behavior is normal.           Assessment & Plan:  1. Upper respiratory tract infection, unspecified type: acute. 6 day hx of symptoms with intermittent course. Duration c/w viral infection, but will provide pocket rx for Augmentin if symptoms worsen or fail to improve.   -amoxicillin-clavulanate (AUGMENTIN) 875-125 mg per tablet; Take 1 Tab by mouth every twelve (12) hours for 5 days. Dispense: 10 Tab; Refill: 0  - Coricidin HBP  -Discussed symptomatic treatment. Patient is in agreement  - discussed precautions and directions for using antibiotics        I have discussed the diagnosis with the patient and the intended plan as seen in the above orders. The patient has received an after-visit summary along with patient information handout. I have discussed medication side effects and warnings with the patient as well. Dispostion  Follow-up and Dispositions    · Return if symptoms worsen or fail to improve.            Bhavik Abebe MD

## 2019-11-09 NOTE — PATIENT INSTRUCTIONS
Saline Nasal Washes: Care Instructions  Your Care Instructions  Saline nasal washes help keep the nasal passages open by washing out thick or dried mucus. This simple remedy can help relieve symptoms of allergies, sinusitis, and colds. It also can make the nose feel more comfortable by keeping the mucous membranes moist. You may notice a little burning sensation in your nose the first few times you use the solution, but this usually gets better in a few days. Follow-up care is a key part of your treatment and safety. Be sure to make and go to all appointments, and call your doctor if you are having problems. It's also a good idea to know your test results and keep a list of the medicines you take. How can you care for yourself at home? · You can buy premixed saline solution in a squeeze bottle or other sinus rinse products at a drugstore. Read and follow the instructions on the label. · You also can make your own saline solution by adding 1 teaspoon of salt and 1 teaspoon of baking soda to 2 cups of distilled water. · If you use a homemade solution, pour a small amount into a clean bowl. Using a rubber bulb syringe, squeeze the syringe and place the tip in the salt water. Pull a small amount of the salt water into the syringe by relaxing your hand. · Sit down with your head tilted slightly back. Do not lie down. Put the tip of the bulb syringe or the squeeze bottle a little way into one of your nostrils. Gently drip or squirt a few drops into the nostril. Repeat with the other nostril. Some sneezing and gagging are normal at first.  · Gently blow your nose. · Wipe the syringe or bottle tip clean after each use. · Repeat this 2 or 3 times a day. · Use nasal washes gently if you have nosebleeds often. When should you call for help? Watch closely for changes in your health, and be sure to contact your doctor if:    · You often get nosebleeds.     · You have problems doing the nasal washes.    Where can you learn more? Go to http://tariq-lenin.info/. Enter 071 [id number] in the search box to learn more about \"Saline Nasal Washes: Care Instructions. \"  Current as of: October 21, 2018  Content Version: 12.2  © 9092-8123 [employer]. Care instructions adapted under license by [employer]. (which disclaims liability or warranty for this information). If you have questions about a medical condition or this instruction, always ask your healthcare professional. [address] any warranty or liability for your use of this information. Upper Respiratory Infection (Cold): Care Instructions  Your Care Instructions    An upper respiratory infection, or URI, is an infection of the nose, sinuses, or throat. URIs are spread by coughs, sneezes, and direct contact. The common cold is the most frequent kind of URI. The flu and sinus infections are other kinds of URIs. Almost all URIs are caused by viruses. Antibiotics won't cure them. But you can treat most infections with home care. This may include drinking lots of fluids and taking over-the-counter pain medicine. You will probably feel better in 4 to 10 days. The doctor has checked you carefully, but problems can develop later. If you notice any problems or new symptoms, get medical treatment right away. Follow-up care is a key part of your treatment and safety. Be sure to make and go to all appointments, and call your doctor if you are having problems. It's also a good idea to know your test results and keep a list of the medicines you take. How can you care for yourself at home? · To prevent dehydration, drink plenty of fluids, enough so that your urine is light yellow or clear like water. Choose water and other caffeine-free clear liquids until you feel better. If you have kidney, heart, or liver disease and have to limit fluids, talk with your doctor before you increase the amount of fluids you drink.   · Take an over-the-counter pain medicine, such as acetaminophen (Tylenol), ibuprofen (Advil, Motrin), or naproxen (Aleve). Read and follow all instructions on the label. · Before you use cough and cold medicines, check the label. These medicines may not be safe for young children or for people with certain health problems. · Be careful when taking over-the-counter cold or flu medicines and Tylenol at the same time. Many of these medicines have acetaminophen, which is Tylenol. Read the labels to make sure that you are not taking more than the recommended dose. Too much acetaminophen (Tylenol) can be harmful. · Get plenty of rest.  · Do not smoke or allow others to smoke around you. If you need help quitting, talk to your doctor about stop-smoking programs and medicines. These can increase your chances of quitting for good. When should you call for help? Call 911 anytime you think you may need emergency care. For example, call if:    · You have severe trouble breathing.    Call your doctor now or seek immediate medical care if:    · You seem to be getting much sicker.     · You have new or worse trouble breathing.     · You have a new or higher fever.     · You have a new rash.    Watch closely for changes in your health, and be sure to contact your doctor if:    · You have a new symptom, such as a sore throat, an earache, or sinus pain.     · You cough more deeply or more often, especially if you notice more mucus or a change in the color of your mucus.     · You do not get better as expected. Where can you learn more? Go to http://tariq-lenin.info/. Enter F538 in the search box to learn more about \"Upper Respiratory Infection (Cold): Care Instructions. \"  Current as of: June 9, 2019  Content Version: 12.2  © 0965-8372 [email or web address]. Care instructions adapted under license by [employer] (which disclaims liability or warranty for this information).  If you have questions about a medical condition or this instruction, always ask your healthcare professional. [address] any warranty or liability for your use of this information. Learning About the Safe Use of Antibiotics  Introduction    Antibiotics are drugs used to kill bacteria. Bacteria can cause infections. These include strep throat, ear infections, and pneumonia. These medicines can't cure everything. They don't kill viruses or help with allergies. They don't help illnesses such as the common cold, the flu, or a runny nose. And they can cause side effects. There are many types of antibiotics. Your doctor will decide which one will work best for your infection. Examples include:  · Amoxicillin. · Cephalexin (Keflex). · Ciprofloxacin (Cipro). What are the possible side effects? Side effects can include:  · Nausea. · Diarrhea. · Skin rash. · Yeast infection. · A severe allergic reaction. It may cause itching, swelling, and breathing problems. This is rare. You may have other side effects or reactions not listed here. Check the information that comes with your medicine. Should you take antibiotics just in case? Don't take antibiotics when you don't need them. If you do that, they may not work when you do need them. Each time you take antibiotics, you are more likely to have some bacteria that survive and aren't killed by the medicine. Bacteria that don't die can change and become even harder to kill. These are called antibiotic-resistant bacteria. They can cause longer and more serious infections. To treat them, you may need different, stronger antibiotics that have more side effects and may cost more. So always ask your doctor if antibiotics are the best treatment. Explain that you do not want antibiotics unless you need them. Help protect the community  Using antibiotics when they're not needed leads to the development of antibiotic-resistant bacteria.  These tougher bacteria can spread to family members, children, and coworkers. People in your community will have a risk of getting an infection that is harder to cure and that costs more to treat. How can you take antibiotics safely? Be safe with medicine. Take your antibiotics as directed. Do not stop taking them just because you feel better. You need to take the full course of medicine. This will help make sure your infection is cured. It will also help prevent the growth of antibiotic-resistant bacteria. Always take the exact amount that the label says to take. If the label says to take the medicine at a certain time, follow those directions. You might feel better after you take an antibiotic for a few days. But it is important to keep taking it for as long as prescribed. That will help you get rid of those bacteria that are a bit stronger and that survive the first few days of treatment. Where can you learn more? Go to http://tariq-lenin.info/. Enter F695 in the search box to learn more about \"Learning About the Safe Use of Antibiotics. \"  Current as of: June 9, 2019  Content Version: 12.2  © 8743-9272 [employer]. Care instructions adapted under license by [employer] (which disclaims liability or warranty for this information). If you have questions about a medical condition or this instruction, always ask your healthcare professional. [address] any warranty or liability for your use of this information.

## 2019-11-09 NOTE — PROGRESS NOTES
Chief Complaint   Patient presents with    Cough     with some congestion in head and chest. Patient has taken Aleve and Claritin without much relief. x 6 days. possible fever. 1. Have you been to the ER, urgent care clinic since your last visit? Hospitalized since your last visit? No    2. Have you seen or consulted any other health care providers outside of the [address] [place] [address] since your last visit? Include any pap smears or colon screening.  No

## 2019-11-15 ENCOUNTER — HOSPITAL ENCOUNTER (EMERGENCY)
Age: 49
Discharge: HOME | End: 2019-11-15
Attending: EMERGENCY MEDICINE | Admitting: EMERGENCY MEDICINE
Payer: COMMERCIAL

## 2019-11-15 ENCOUNTER — APPOINTMENT (OUTPATIENT)
Dept: GENERAL RADIOLOGY | Age: 49
End: 2019-11-15
Attending: EMERGENCY MEDICINE
Payer: COMMERCIAL

## 2019-11-15 VITALS
OXYGEN SATURATION: 97 % | RESPIRATION RATE: 16 BRPM | DIASTOLIC BLOOD PRESSURE: 93 MMHG | TEMPERATURE: 97.7 F | HEART RATE: 88 BPM | SYSTOLIC BLOOD PRESSURE: 159 MMHG

## 2019-11-15 DIAGNOSIS — R07.81 RIB PAIN ON RIGHT SIDE: Primary | ICD-10-CM

## 2019-11-15 PROCEDURE — 94640 AIRWAY INHALATION TREATMENT: CPT

## 2019-11-15 PROCEDURE — 74011250636 HC RX REV CODE- 250/636: Performed by: EMERGENCY MEDICINE

## 2019-11-15 PROCEDURE — 96372 THER/PROPH/DIAG INJ SC/IM: CPT

## 2019-11-15 PROCEDURE — 99283 EMERGENCY DEPT VISIT LOW MDM: CPT

## 2019-11-15 PROCEDURE — 74011636637 HC RX REV CODE- 636/637: Performed by: EMERGENCY MEDICINE

## 2019-11-15 PROCEDURE — 94664 DEMO&/EVAL PT USE INHALER: CPT

## 2019-11-15 PROCEDURE — 74011000250 HC RX REV CODE- 250: Performed by: EMERGENCY MEDICINE

## 2019-11-15 PROCEDURE — 71101 X-RAY EXAM UNILAT RIBS/CHEST: CPT

## 2019-11-15 RX ORDER — PREDNISONE 20 MG/1
40 TABLET ORAL DAILY
Qty: 8 TAB | Refills: 0 | Status: SHIPPED | OUTPATIENT
Start: 2019-11-15 | End: 2019-11-19

## 2019-11-15 RX ORDER — KETOROLAC TROMETHAMINE 30 MG/ML
60 INJECTION, SOLUTION INTRAMUSCULAR; INTRAVENOUS
Status: COMPLETED | OUTPATIENT
Start: 2019-11-15 | End: 2019-11-15

## 2019-11-15 RX ORDER — KETOROLAC TROMETHAMINE 10 MG/1
10 TABLET, FILM COATED ORAL
Qty: 18 TAB | Refills: 0 | Status: SHIPPED | OUTPATIENT
Start: 2019-11-15 | End: 2019-11-19

## 2019-11-15 RX ORDER — PREDNISONE 20 MG/1
60 TABLET ORAL
Status: COMPLETED | OUTPATIENT
Start: 2019-11-15 | End: 2019-11-15

## 2019-11-15 RX ORDER — AZITHROMYCIN 250 MG/1
TABLET, FILM COATED ORAL
Qty: 6 TAB | Refills: 0 | Status: SHIPPED | OUTPATIENT
Start: 2019-11-15 | End: 2019-11-20

## 2019-11-15 RX ADMIN — PREDNISONE 60 MG: 20 TABLET ORAL at 09:01

## 2019-11-15 RX ADMIN — ALBUTEROL SULFATE 1 DOSE: 2.5 SOLUTION RESPIRATORY (INHALATION) at 09:06

## 2019-11-15 RX ADMIN — KETOROLAC TROMETHAMINE 60 MG: 30 INJECTION, SOLUTION INTRAMUSCULAR at 09:01

## 2019-11-15 NOTE — DISCHARGE INSTRUCTIONS
Patient Education        Musculoskeletal Chest Pain: Care Instructions  Your Care Instructions    Chest pain is not always a sign that something is wrong with your heart or that you have another serious problem. The doctor thinks your chest pain is caused by strained muscles or ligaments, inflamed chest cartilage, or another problem in your chest, rather than by your heart. You may need more tests to find the cause of your chest pain. Follow-up care is a key part of your treatment and safety. Be sure to make and go to all appointments, and call your doctor if you are having problems. It's also a good idea to know your test results and keep a list of the medicines you take. How can you care for yourself at home? · Take pain medicines exactly as directed. ? If the doctor gave you a prescription medicine for pain, take it as prescribed. ? If you are not taking a prescription pain medicine, ask your doctor if you can take an over-the-counter medicine. · Rest and protect the sore area. · Stop, change, or take a break from any activity that may be causing your pain or soreness. · Put ice or a cold pack on the sore area for 10 to 20 minutes at a time. Try to do this every 1 to 2 hours for the next 3 days (when you are awake) or until the swelling goes down. Put a thin cloth between the ice and your skin. · After 2 or 3 days, apply a heating pad set on low or a warm cloth to the area that hurts. Some doctors suggest that you go back and forth between hot and cold. · Do not wrap or tape your ribs for support. This may cause you to take smaller breaths, which could increase your risk of lung problems. · Mentholated creams such as Bengay or Icy Hot may soothe sore muscles. Follow the instructions on the package. · Follow your doctor's instructions for exercising. · Gentle stretching and massage may help you get better faster.  Stretch slowly to the point just before pain begins, and hold the stretch for at least 15 to 30 seconds. Do this 3 or 4 times a day. Stretch just after you have applied heat. · As your pain gets better, slowly return to your normal activities. Any increased pain may be a sign that you need to rest a while longer. When should you call for help? Call 911 anytime you think you may need emergency care. For example, call if:    · You have chest pain or pressure. This may occur with:  ? Sweating. ? Shortness of breath. ? Nausea or vomiting. ? Pain that spreads from the chest to the neck, jaw, or one or both shoulders or arms. ? Dizziness or lightheadedness. ? A fast or uneven pulse. After calling 911, chew 1 adult-strength aspirin. Wait for an ambulance. Do not try to drive yourself.     · You have sudden chest pain and shortness of breath, or you cough up blood.    Call your doctor now or seek immediate medical care if:    · You have any trouble breathing.     · Your chest pain gets worse.     · Your chest pain occurs consistently with exercise and is relieved by rest.    Watch closely for changes in your health, and be sure to contact your doctor if:    · Your chest pain does not get better after 1 week. Where can you learn more? Go to http://tariq-lenin.info/. Enter V293 in the search box to learn more about \"Musculoskeletal Chest Pain: Care Instructions. \"  Current as of: June 26, 2019  Content Version: 12.2  © 0425-1280 [employer]. Care instructions adapted under license by [employer] (which disclaims liability or warranty for this information). If you have questions about a medical condition or this instruction, always ask your healthcare professional. [place] [address] any warranty or liability for your use of this information. Patient Education        Costochondritis: Care Instructions  Your Care Instructions  You have chest pain because the cartilage of your rib cage is inflamed. This problem is called costochondritis. This type of chest wall pain may last from days to weeks. It is not a heart problem. Sometimes costochondritis occurs with a cold or the flu, and other times the exact cause is not known. Follow-up care is a key part of your treatment and safety. Be sure to make and go to all appointments, and call your doctor if you are having problems. It's also a good idea to know your test results and keep a list of the medicines you take. How can you care for yourself at home? · Take medicines for pain and inflammation exactly as directed. ? If the doctor gave you a prescription medicine, take it as prescribed. ? If you are not taking a prescription pain medicine, ask your doctor if you can take an over-the-counter medicine. ? Do not take two or more pain medicines at the same time unless the doctor told you to. Many pain medicines have acetaminophen, which is Tylenol. Too much acetaminophen (Tylenol) can be harmful. · It may help to use a warm compress or heating pad (set on low) on your chest. You can also try alternating heat and ice. Put ice or a cold pack on the area for 10 to 20 minutes at a time. Put a thin cloth between the ice and your skin. · Avoid any activity that strains the chest area. As your pain gets better, you can slowly return to your normal activities. · Do not use tape, an elastic bandage, a \"rib belt,\" or anything else that restricts your chest wall motion. When should you call for help? Call 911 anytime you think you may need emergency care. For example, call if:    · You have new or different chest pain or pressure. This may occur with:  ? Sweating. ? Shortness of breath. ? Nausea or vomiting. ? Pain that spreads from the chest to the neck, jaw, or one or both shoulders or arms. ? Dizziness or lightheadedness. ? A fast or uneven pulse. After calling 911, chew 1 adult-strength aspirin. Wait for an ambulance.  Do not try to drive yourself.     · You have severe trouble breathing.    Call your doctor now or seek immediate medical care if:    · You have a fever or cough.     · You have any trouble breathing.     · Your chest pain gets worse.    Watch closely for changes in your health, and be sure to contact your doctor if:    · Your chest pain continues even though you are taking anti-inflammatory medicine.     · Your chest wall pain has not improved after 5 to 7 days. Where can you learn more? Go to http://tariq-lenin.info/. Enter C802 in the search box to learn more about \"Costochondritis: Care Instructions. \"  Current as of: June 26, 2019  Content Version: 12.2  © 6172-0343 [employer]. Care instructions adapted under license by [employer] (which disclaims liability or warranty for this information). If you have questions about a medical condition or this instruction, always ask your healthcare professional. [address] any warranty or liability for your use of this information.

## 2019-11-15 NOTE — ED TRIAGE NOTES
Pt stated he was coughing and felt something in his right rib, now having pain , pt also stated his right eye was red upon waking up yesterday, denies fever

## 2019-11-15 NOTE — ED PROVIDER NOTES
HPI patient is a 77-year-old white male who reports abrupt onset of right lower rib area pain today after coughing while driving his daughter to school. He states that he has had cough and cold symptoms for the past 2 weeks and just completed 5 days of Augmentin 875 mg twice a day as prescribed by his doctor. He denies any falls or direct injury to his ribs. The pain intensifies with any coughing and certain movements. There is no obvious deformity, bruising, erythema or rash. Denies any fever, difficulty breathing, difficulty swallowing, SOB or chest pain. Denies any nausea, vomiting or diarrhea.  Pt. Reports that he has not had any pain medications today prior to arrival.      Past Medical History:   Diagnosis Date    Adverse effect of anesthesia     TAKES A LONG TIME TO WAKE UP    Anxiety     Arthritis     Complex tear of medial meniscus of left knee 3/28/2019    GERD (gastroesophageal reflux disease)     HTN, goal below 140/90 10/09/2011    Hyperlipidemia 10/9/2011    Lipoma 10/9/2011    Sleep apnea     CPAP        Past Surgical History:   Procedure Laterality Date    HX ORTHOPAEDIC Left 03/28/2019    ACL replaced         Family History:   Problem Relation Age of Onset    Cancer Mother         BREAST    Diabetes Mother         type 2    Hypertension Mother     Hypertension Father     Heart Attack Father         64    No Known Problems Sister     Deep Vein Thrombosis Brother     Deep Vein Thrombosis Other     Anesth Problems Neg Hx        Social History     Socioeconomic History    Marital status:      Spouse name: Not on file    Number of children: Not on file    Years of education: Not on file    Highest education level: Not on file   Occupational History    Not on file   Social Needs    Financial resource strain: Not on file    Food insecurity:     Worry: Not on file     Inability: Not on file    Transportation needs:     Medical: Not on file     Non-medical: Not on file Tobacco Use    Smoking status: Never Smoker    Smokeless tobacco: Never Used   Substance and Sexual Activity    Alcohol use: Yes     Comment: 1-2 drinks a month    Drug use: Never    Sexual activity: Yes     Partners: Female   Lifestyle    Physical activity:     Days per week: Not on file     Minutes per session: Not on file    Stress: Not on file   Relationships    Social connections:     Talks on phone: Not on file     Gets together: Not on file     Attends Hoahaoism service: Not on file     Active member of club or organization: Not on file     Attends meetings of clubs or organizations: Not on file     Relationship status: Not on file    Intimate partner violence:     Fear of current or ex partner: Not on file     Emotionally abused: Not on file     Physically abused: Not on file     Forced sexual activity: Not on file   Other Topics Concern    Not on file   Social History Narrative    Not on file         ALLERGIES: Other food    Review of Systems   Constitutional: Negative for activity change, appetite change, fever and unexpected weight change. HENT: Positive for congestion. Negative for ear discharge, sinus pressure, sinus pain, sore throat and trouble swallowing. Respiratory: Positive for cough and chest tightness. Negative for shortness of breath. Cardiovascular: Negative for chest pain, palpitations and leg swelling. Gastrointestinal: Negative for abdominal pain, diarrhea, nausea and vomiting. Genitourinary: Negative for dysuria. Musculoskeletal: Negative for arthralgias and myalgias. Skin: Negative for rash and wound. Neurological: Negative for headaches. All other systems reviewed and are negative. Vitals:    11/15/19 0835   BP: (!) 149/97   Pulse: 89   Resp: 16   Temp: 98.3 °F (36.8 °C)   SpO2: 98%            Physical Exam   Constitutional: He appears well-developed. Obese white male; non smoker; self employed;    HENT:   Head: Normocephalic.    Right Ear: External ear normal.   Left Ear: External ear normal.   Nose: Nose normal.   Mouth/Throat: Oropharynx is clear and moist.   Eyes: Pupils are equal, round, and reactive to light. Superficial subconjunctival hemorrhage noted in the right eye   Neck: Normal range of motion. Neck supple. Cardiovascular: Normal rate and regular rhythm. Pulmonary/Chest: Effort normal and breath sounds normal. He exhibits tenderness. Reports sharp; right sided lower lateral rib area tenderness with coughing and position changes   Abdominal: Soft. Bowel sounds are normal.   Musculoskeletal: Normal range of motion. Skin: Skin is warm and dry. No rash noted. Psychiatric: He has a normal mood and affect. Nursing note and vitals reviewed. MDM   Occult rib fracture, costochondritis, chest wall spasm, pneumothorax       Procedures  Pt was offered a CT chest w/o but refused at this time. Patient has been reexamined and reports some relief from medications given. Plan to treat with short course of prednisone, Zithromax, Combivent MDI, and Toradol as needed. Recommend close follow-up with PCP. Encourage patient to splint his rib area when needing to cough, sneeze, burp, etc.    9:38 AM  Pt has been re-examined after nebulizer treatments and states that they are feeling better and have no new complaints. On auscultation, wheezing is significantly improved. Medications, x-rays, diagnosis, follow up plan and return instructions have been reviewed and discussed with the patient. Pt has had the opportunity to ask questions about his care. Patient expresses understanding and agreement with care plan, including oral steroids, nebulizer or inhaler use, follow up and return instructions. Patient agrees to return in 24 hours if his symptoms are not improving or immediately if he has any change in his condition including worsening wheezing or any signs of increasing work of breathing. Discussed plan of care with Dr. Reshma Atwood. Mariela Still Jer, NP

## 2019-11-19 ENCOUNTER — OFFICE VISIT (OUTPATIENT)
Dept: FAMILY MEDICINE CLINIC | Age: 49
End: 2019-11-19

## 2019-11-19 VITALS
TEMPERATURE: 97.8 F | RESPIRATION RATE: 14 BRPM | SYSTOLIC BLOOD PRESSURE: 150 MMHG | HEART RATE: 58 BPM | BODY MASS INDEX: 35.22 KG/M2 | HEIGHT: 71 IN | DIASTOLIC BLOOD PRESSURE: 96 MMHG | WEIGHT: 251.6 LBS | OXYGEN SATURATION: 98 %

## 2019-11-19 DIAGNOSIS — R07.81 RIB PAIN ON RIGHT SIDE: ICD-10-CM

## 2019-11-19 DIAGNOSIS — R05.9 COUGH: Primary | ICD-10-CM

## 2019-11-19 RX ORDER — PREDNISONE 10 MG/1
TABLET ORAL
Qty: 18 TAB | Refills: 0 | Status: SHIPPED | OUTPATIENT
Start: 2019-11-19 | End: 2020-01-14 | Stop reason: ALTCHOICE

## 2019-11-19 NOTE — PATIENT INSTRUCTIONS
Cough: Care Instructions Your Care Instructions A cough is your body's response to something that bothers your throat or airways. Many things can cause a cough. You might cough because of a cold or the flu, bronchitis, or asthma. Smoking, postnasal drip, allergies, and stomach acid that backs up into your throat also can cause coughs. A cough is a symptom, not a disease. Most coughs stop when the cause, such as a cold, goes away. You can take a few steps at home to cough less and feel better. Follow-up care is a key part of your treatment and safety. Be sure to make and go to all appointments, and call your doctor if you are having problems. It's also a good idea to know your test results and keep a list of the medicines you take. How can you care for yourself at home? · Drink lots of water and other fluids. This helps thin the mucus and soothes a dry or sore throat. Honey or lemon juice in hot water or tea may ease a dry cough. · Take cough medicine as directed by your doctor. · Prop up your head on pillows to help you breathe and ease a dry cough. · Try cough drops to soothe a dry or sore throat. Cough drops don't stop a cough. Medicine-flavored cough drops are no better than candy-flavored drops or hard candy. · Do not smoke. Avoid secondhand smoke. If you need help quitting, talk to your doctor about stop-smoking programs and medicines. These can increase your chances of quitting for good. When should you call for help? Call 911 anytime you think you may need emergency care.  For example, call if: 
  · You have severe trouble breathing.  
 Call your doctor now or seek immediate medical care if: 
  · You cough up blood.  
  · You have new or worse trouble breathing.  
  · You have a new or higher fever.  
  · You have a new rash.  
 Watch closely for changes in your health, and be sure to contact your doctor if: 
  · You cough more deeply or more often, especially if you notice more mucus or a change in the color of your mucus.  
  · You have new symptoms, such as a sore throat, an earache, or sinus pain.  
  · You do not get better as expected. Where can you learn more? Go to http://tariq-lenin.info/. Enter D279 in the search box to learn more about \"Cough: Care Instructions. \" Current as of: June 9, 2019 Content Version: 12.2 © 5039-5599 [employer]. Care instructions adapted under license by [employer] (which disclaims liability or warranty for this information). If you have questions about a medical condition or this instruction, always ask your healthcare professional. [address] any warranty or liability for your use of this information.

## 2019-11-19 NOTE — PROGRESS NOTES
Pt has has a \"cold\" for 16 days with persistent productive coughing. He has finished steroids and antibiotics. He is currently sing Albuterol inhaler with little relief.  Bilateral ear fullness

## 2019-11-19 NOTE — PROGRESS NOTES
Patient Name: Divine Russo   MRN: [id number]    Nasim Horner is a 54 y.o. male who presents with the following:     Patient was seen for URI symptoms and cough on 11/9. Was given Augmentin. He subsequently went to the emergency room after a violent coughing episode that resulted in severe right lower rib cage pain. Went to the emergency room where they did a chest x-ray which was within normal limits. He was discharged with Combivent inhaler, prednisone, and azithromycin. Today, he states that overall he is feeling better but still has a persistent cough. Last dose of prednisone and azithromycin was today. Combivent temporarily helps. Still having some pain along his rib cage but manageable. No longer taking Toradol. Denies fever, history of asthma, lung conditions. Would like to minimize medicines if possible. Review of Systems   Constitutional: Negative for fever, malaise/fatigue and weight loss. Respiratory: Positive for cough. Negative for hemoptysis, shortness of breath and wheezing. Cardiovascular: Negative for chest pain, palpitations, leg swelling and PND. Gastrointestinal: Negative for abdominal pain, constipation, diarrhea, nausea and vomiting. The patient's medications, allergies, past medical history, surgical history, family history and social history were reviewed and updated where appropriate. Prior to Admission medications    Medication Sig Start Date End Date Taking? Authorizing Provider   azithromycin (ZITHROMAX Z-GEOFF) 250 mg tablet Take 2 tablets today; then 1 tablet daily for the next 4 days 11/15/19 11/20/19 Yes Brandy Randle NP   predniSONE (DELTASONE) 20 mg tablet Take 40 mg by mouth daily for 4 days. Start on 11/16/19 With Breakfast 11/15/19 11/19/19 Yes Brandy Randle NP   ketorolac (TORADOL) 10 mg tablet Take 1 Tab by mouth three (3) times daily as needed for Pain.  11/15/19  Yes Marquis Radha NP   ipratropium-albuterol (COMBIVENT RESPIMAT)  mcg/actuation inhaler Take 1 Puff by inhalation every six (6) hours for 5 days. 11/15/19 11/20/19 Yes Brandy Randle NP   escitalopram oxalate (LEXAPRO) 20 mg tablet Take 1 Tab by mouth daily. 8/2/19  Yes Michael Verdugo MD   irbesartan (AVAPRO) 300 mg tablet Take 1 Tab by mouth daily. 6/7/19  Yes Michael Verdugo MD   aspirin delayed-release 81 mg tablet Take 4 Tabs by mouth two (2) times a day. Patient taking differently: Take 81 mg by mouth daily. 3/28/19  Yes Ye Rendon MD   atorvastatin (LIPITOR) 20 mg tablet Take 20 mg by mouth daily. Yes Provider, Historical   loratadine (CLARITIN) 10 mg tablet Take 10 mg by mouth daily. Yes Provider, Historical       Allergies   Allergen Reactions    Other Food Other (comments)     PEANUTS: NAUSEA AND UPSET STOMACH         OBJECTIVE    Visit Vitals  BP (!) 150/96 (BP 1 Location: Left arm, BP Patient Position: Sitting)   Pulse (!) 58   Temp 97.8 °F (36.6 °C) (Oral)   Resp 14   Ht 5' 11\" (1.803 m)   Wt 251 lb 9.6 oz (114.1 kg)   SpO2 98%   BMI 35.09 kg/m²       Physical Exam   Constitutional: He is oriented to person, place, and time and well-developed, well-nourished, and in no distress. No distress. HENT:   Head: Normocephalic and atraumatic. Right Ear: Tympanic membrane is not perforated and not erythematous. A middle ear effusion is present. No decreased hearing is noted. Left Ear: Tympanic membrane is not perforated and not erythematous. A middle ear effusion is present. No decreased hearing is noted. Nose: Nose normal. Right sinus exhibits no maxillary sinus tenderness and no frontal sinus tenderness. Left sinus exhibits no maxillary sinus tenderness and no frontal sinus tenderness. Mouth/Throat: Uvula is midline, oropharynx is clear and moist and mucous membranes are normal.   Neck: Normal range of motion. Neck supple. Cardiovascular: Normal rate, regular rhythm and normal heart sounds. Exam reveals no gallop and no friction rub.    No murmur heard.  Pulmonary/Chest: Effort normal and breath sounds normal. No respiratory distress. He has no wheezes. He exhibits tenderness (TTP along lower right lateral rib cage). Lymphadenopathy:     He has no cervical adenopathy. Neurological: He is alert and oriented to person, place, and time. Skin: Skin is warm and dry. No rash noted. He is not diaphoretic. No erythema. Psychiatric: Mood, memory, affect and judgment normal.   Nursing note and vitals reviewed. ASSESSMENT AND PLAN  Khloe Neves is a 54 y.o. male who presents today for:    1. Cough  Recommend slow taper prednisone to help with cough. Reviewed that he may have a post viral cough lasting up to 6 weeks after initial cough. If it does not improve with prednisone, could consider pulmonology referral. Nany Dhaliwal but pt declined. - predniSONE (DELTASONE) 10 mg tablet; Take 3 tabs once daily x 3 days, 2 tabs once daily x 3 days, 1 tab daily x 3 days  Dispense: 18 Tab; Refill: 0    2. Rib pain on right side  Recommend OTC lidocaine patches. Medications Discontinued During This Encounter   Medication Reason    ketorolac (TORADOL) 10 mg tablet      Medication risks/benefits/costs/interactions/alternatives discussed with patient. Advised patient to call back or return to office if symptoms worsen/change/persist. If patient cannot reach us or should anything more severe/urgent arise he/she should proceed directly to the nearest emergency department. Discussed expected course/resolution/complications of diagnosis in detail with patient. Patient given a written after visit summary which includes his/her diagnoses, current medications and vitals. Patient expressed understanding with the diagnosis and plan. Sara Canales M.D.

## 2020-01-09 NOTE — PROGRESS NOTES
[facility]  [address]  [place], [address]  Phone: [phone]  Fax: [phone]    Medicaid Discharge Summary  2-15    Patient name: Henry Pierre  : 1970  Provider#:[id number]  Referral source: Carlos Mariano MD      Medical/Treatment Diagnosis: Left knee pain [M25.562]     Prior Hospitalization: see medical history     Comorbidities: see chart  Prior Level of Function:see chart  Medications: Verified on Patient Summary List    Start of Care: 19      Onset Date:3/28/19   Visits from Start of Care: 24    Missed Visits: 2  Reporting Period : 19 to 19    ASSESSMENT/SUMMARY OF CARE: Pt has made great progress over the past few weeks and now demonstrates full knee ROM, great knee stability, normal balance, and full function. Pt has been given updated HEP and educated on contacting MD if questions arise. RECOMMENDATIONS:  [x]Discontinue therapy: [x]Patient has reached or is progressing toward set goals      []Patient is non-compliant or has abdicated      []Due to lack of appreciable progress towards set goals    María Bhakta, PT, DPT, OCS 2020      ______________________________________________________________________    NOTE TO PHYSICIAN:  Please complete the following and fax to: [facility]: [phone]  Retain this original for your records. If you are unable to process this request in 24 hours, please contact our office.      [de-identified] Signature:____________________  Date:____________Time:_________

## 2020-01-14 ENCOUNTER — OFFICE VISIT (OUTPATIENT)
Dept: FAMILY MEDICINE CLINIC | Age: 50
End: 2020-01-14

## 2020-01-14 VITALS
OXYGEN SATURATION: 95 % | WEIGHT: 252.4 LBS | SYSTOLIC BLOOD PRESSURE: 124 MMHG | DIASTOLIC BLOOD PRESSURE: 88 MMHG | BODY MASS INDEX: 35.34 KG/M2 | HEIGHT: 71 IN | TEMPERATURE: 98.1 F | RESPIRATION RATE: 18 BRPM | HEART RATE: 89 BPM

## 2020-01-14 DIAGNOSIS — H10.9 CONJUNCTIVITIS OF BOTH EYES, UNSPECIFIED CONJUNCTIVITIS TYPE: Primary | ICD-10-CM

## 2020-01-14 RX ORDER — POLYMYXIN B SULFATE AND TRIMETHOPRIM 1; 10000 MG/ML; [USP'U]/ML
SOLUTION OPHTHALMIC
Qty: 10 ML | Refills: 0 | Status: SHIPPED | OUTPATIENT
Start: 2020-01-14

## 2020-01-14 NOTE — PATIENT INSTRUCTIONS
Pinkeye: Care Instructions  Your Care Instructions    Pinkeye is redness and swelling of the eye surface and the conjunctiva (the lining of the eyelid and the covering of the white part of the eye). Pinkeye is also called conjunctivitis. Pinkeye is often caused by infection with bacteria or a virus. Dry air, allergies, smoke, and chemicals are other common causes. Pinkeye often clears on its own in 7 to 10 days. Antibiotics only help if the pinkeye is caused by bacteria. Pinkeye caused by infection spreads easily. If an allergy or chemical is causing pinkeye, it will not go away unless you can avoid whatever is causing it. Follow-up care is a key part of your treatment and safety. Be sure to make and go to all appointments, and call your doctor if you are having problems. It's also a good idea to know your test results and keep a list of the medicines you take. How can you care for yourself at home? · Wash your hands often. Always wash them before and after you treat pinkeye or touch your eyes or face. · Use moist cotton or a clean, wet cloth to remove crust. Wipe from the inside corner of the eye to the outside. Use a clean part of the cloth for each wipe. · Put cold or warm wet cloths on your eye a few times a day if the eye hurts. · Do not wear contact lenses or eye makeup until the pinkeye is gone. Throw away any eye makeup you were using when you got pinkeye. Clean your contacts and storage case. If you wear disposable contacts, use a new pair when your eye has cleared and it is safe to wear contacts again. · If the doctor gave you antibiotic ointment or eyedrops, use them as directed. Use the medicine for as long as instructed, even if your eye starts looking better soon. Keep the bottle tip clean, and do not let it touch the eye area. · To put in eyedrops or ointment:  ? Tilt your head back, and pull your lower eyelid down with one finger. ?  Drop or squirt the medicine inside the lower lid.  ? Close your eye for 30 to 60 seconds to let the drops or ointment move around. ? Do not touch the ointment or dropper tip to your eyelashes or any other surface. · Do not share towels, pillows, or washcloths while you have pinkeye. When should you call for help? Call your doctor now or seek immediate medical care if:    · You have pain in your eye, not just irritation on the surface.     · You have a change in vision or loss of vision.     · You have an increase in discharge from the eye.     · Your eye has not started to improve or begins to get worse within 48 hours after you start using antibiotics.     · Pinkeye lasts longer than 7 days.    Watch closely for changes in your health, and be sure to contact your doctor if you have any problems. Where can you learn more? Go to http://tariq-lenin.info/. Enter Y392 in the search box to learn more about \"Pinkeye: Care Instructions. \"  Current as of: June 26, 2019  Content Version: 12.2  © 1849-8113 [employer]. Care instructions adapted under license by [employer] (which disclaims liability or warranty for this information). If you have questions about a medical condition or this instruction, always ask your healthcare professional. [address] any warranty or liability for your use of this information.

## 2020-01-14 NOTE — PROGRESS NOTES
Patient Name: Deric Silva   MRN: [id number]    Dharmesh Beauchamp is a 54 y.o. male who presents with the following:     Reports 3 day hx of bilateral eye discharge and redness. Had some left ear pain and discharge a few days before eye symptoms. Does not wear contacts. Denies recent sick contacts, fevers, coughing, sore throat. Review of Systems   Constitutional: Negative for fever, malaise/fatigue and weight loss. HENT: Positive for ear discharge and ear pain. Eyes: Positive for discharge and redness. Negative for blurred vision, double vision, photophobia and pain. Respiratory: Negative for cough, hemoptysis, shortness of breath and wheezing. Cardiovascular: Negative for chest pain, palpitations, leg swelling and PND. Gastrointestinal: Negative for abdominal pain, constipation, diarrhea, nausea and vomiting. The patient's medications, allergies, past medical history, surgical history, family history and social history were reviewed and updated where appropriate. Prior to Admission medications    Medication Sig Start Date End Date Taking? Authorizing Provider   irbesartan (AVAPRO) 300 mg tablet TAKE 1 TABLET BY MOUTH DAILY  Patient taking differently: Take 300 mg by mouth daily. 12/9/19  Yes Belen Beltre MD   escitalopram oxalate (LEXAPRO) 20 mg tablet Take 1 Tab by mouth daily. 8/2/19  Yes Belen Beltre MD   aspirin delayed-release 81 mg tablet Take 4 Tabs by mouth two (2) times a day. Patient taking differently: Take 81 mg by mouth daily. 3/28/19  Yes Dominick Romo MD   atorvastatin (LIPITOR) 20 mg tablet Take 20 mg by mouth daily. Yes Provider, Historical   loratadine (CLARITIN) 10 mg tablet Take 10 mg by mouth daily.    Yes Provider, Historical   predniSONE (DELTASONE) 10 mg tablet Take 3 tabs once daily x 3 days, 2 tabs once daily x 3 days, 1 tab daily x 3 days 11/19/19 1/14/20  Belen Beltre MD       Allergies   Allergen Reactions    Other Food Other (comments)     PEANUTS: NAUSEA AND UPSET STOMACH         OBJECTIVE    Visit Vitals  /88 (BP 1 Location: Left arm, BP Patient Position: Sitting)   Pulse 89   Temp 98.1 °F (36.7 °C) (Oral)   Resp 18   Ht 5' 11\" (1.803 m)   Wt 252 lb 6.4 oz (114.5 kg)   SpO2 95%   BMI 35.20 kg/m²       Physical Exam  Vitals signs and nursing note reviewed. Constitutional:       General: He is not in acute distress. Appearance: He is not diaphoretic. HENT:      Right Ear: Hearing, tympanic membrane, ear canal and external ear normal.      Left Ear: Hearing, tympanic membrane, ear canal and external ear normal.   Eyes:      General:         Right eye: Discharge present. Left eye: Discharge present. Extraocular Movements: Extraocular movements intact. Right eye: Normal extraocular motion. Left eye: Normal extraocular motion. Conjunctiva/sclera:      Right eye: Right conjunctiva is injected. Left eye: Left conjunctiva is injected. Pupils: Pupils are equal, round, and reactive to light. Cardiovascular:      Rate and Rhythm: Normal rate and regular rhythm. Heart sounds: Normal heart sounds. No murmur. No friction rub. No gallop. Pulmonary:      Effort: Pulmonary effort is normal. No respiratory distress. Breath sounds: Normal breath sounds. No wheezing. Skin:     General: Skin is warm and dry. Findings: No rash. Neurological:      Mental Status: He is alert and oriented to person, place, and time. Psychiatric:         Mood and Affect: Mood and affect normal.         Cognition and Memory: Memory normal.         Judgment: Judgment normal.           ASSESSMENT AND PLAN  Coral Juárez is a 54 y.o. male who presents today for:    1.  Conjunctivitis of both eyes, unspecified conjunctivitis type  - trimethoprim-polymyxin b (POLYTRIM) ophthalmic solution; 2 drops four times daily to affected eye for 7 days  Dispense: 10 mL; Refill: 0       Medications Discontinued During This Encounter   Medication Reason    predniSONE (DELTASONE) 10 mg tablet Therapy Completed     Medication risks/benefits/costs/interactions/alternatives discussed with patient. Advised patient to call back or return to office if symptoms worsen/change/persist. If patient cannot reach us or should anything more severe/urgent arise he/she should proceed directly to the nearest emergency department. Discussed expected course/resolution/complications of diagnosis in detail with patient. Patient given a written after visit summary which includes his/her diagnoses, current medications and vitals. Patient expressed understanding with the diagnosis and plan. Sara Abbasi M.D.

## 2020-01-14 NOTE — PROGRESS NOTES
Chief Complaint   Patient presents with    Red Eye     x 3 days    Itchy Eye     also discharge x 3 days    Ear Pain     dull pain on left ear x 5 days     1. Have you been to the ER, urgent care clinic since your last visit? Hospitalized since your last visit? No    2. Have you seen or consulted any other health care providers outside of the [address] [place] [address] since your last visit? Include any pap smears or colon screening.  No

## 2020-03-02 ENCOUNTER — OFFICE VISIT (OUTPATIENT)
Dept: FAMILY MEDICINE CLINIC | Age: 50
End: 2020-03-02

## 2020-03-02 VITALS
DIASTOLIC BLOOD PRESSURE: 89 MMHG | SYSTOLIC BLOOD PRESSURE: 138 MMHG | RESPIRATION RATE: 22 BRPM | WEIGHT: 250.4 LBS | TEMPERATURE: 98.9 F | HEIGHT: 71 IN | OXYGEN SATURATION: 99 % | BODY MASS INDEX: 35.06 KG/M2 | HEART RATE: 104 BPM

## 2020-03-02 DIAGNOSIS — R52 GENERALIZED BODY ACHES: ICD-10-CM

## 2020-03-02 DIAGNOSIS — B34.9 VIRAL ILLNESS: Primary | ICD-10-CM

## 2020-03-02 LAB
QUICKVUE INFLUENZA TEST: NEGATIVE
VALID INTERNAL CONTROL?: YES

## 2020-03-02 NOTE — PROGRESS NOTES
HISTORY OF PRESENT ILLNESS  Walt Hillman is a 52 y.o. male. HPI  C/o generalized body aches, headache that began yesterday. Has mild dry cough, nausea and single episode of loose stool this am.  No fever. Past medical history, social history, family history and medications were reviewed and updated. Blood pressure 138/89, pulse (!) 104, temperature 98.9 °F (37.2 °C), temperature source Oral, resp. rate 22, height 5' 11\" (1.803 m), weight 250 lb 6.4 oz (113.6 kg), SpO2 99 %. Review of Systems   Constitutional: Positive for chills and malaise/fatigue. Negative for fever. HENT: Negative for congestion, ear pain, sinus pain and sore throat. Respiratory: Positive for cough. Negative for sputum production, shortness of breath and wheezing. Cardiovascular: Negative for chest pain. Gastrointestinal: Positive for diarrhea (loose stool x 1) and nausea. Negative for abdominal pain, blood in stool and vomiting. Genitourinary: Negative. Neurological: Positive for headaches. All other systems reviewed and are negative. Physical Exam  Constitutional:       General: He is not in acute distress. HENT:      Right Ear: Tympanic membrane and ear canal normal.      Left Ear: Tympanic membrane and ear canal normal.      Nose: No congestion. Mouth/Throat:      Mouth: Mucous membranes are moist.      Pharynx: Oropharynx is clear. Neck:      Musculoskeletal: Neck supple. Cardiovascular:      Rate and Rhythm: Normal rate and regular rhythm. Heart sounds: Normal heart sounds. Pulmonary:      Effort: Pulmonary effort is normal.      Breath sounds: Normal breath sounds. Abdominal:      General: There is no distension. Palpations: Abdomen is soft. Tenderness: There is no abdominal tenderness. Lymphadenopathy:      Cervical: No cervical adenopathy. Skin:     General: Skin is warm and dry. ASSESSMENT and PLAN  Diagnoses and all orders for this visit:    1.  Viral illness  Possible URI  vs. viral fastroenteritis. Recommend rest and fluids. Ibuprofen or tylenol as directed prn headache/body aches. Trial pepto bismal prn for loose stool. Soft, bland diet. 2. Generalized body aches  -     AMB POC RAPID INFLUENZA TEST  Negative rapid flu. Follow up prn if sx worsen or FTI.

## 2020-03-02 NOTE — PROGRESS NOTES
Chief Complaint   Patient presents with    Generalized Body Aches     dizzy, feverish, chills, head congestion, dry cough started yesterday has taken claritin      Spoke to patient Identified pt with two pt identifiers (Name @ )    1. Have you been to the ER, urgent care clinic since your last visit? Hospitalized since your last visit? No    2. Have you seen or consulted any other health care providers outside of the [address] [place] [address] since your last visit? Include any pap smears or colon screening.  No     \"REVIEWED RECORD IN PREPARATION FOR VISIT AND HAVE OBTAINED NECESSARY DOCUMENTATION\"

## 2020-04-14 DIAGNOSIS — F41.9 ANXIETY AND DEPRESSION: ICD-10-CM

## 2020-04-14 DIAGNOSIS — F32.A ANXIETY AND DEPRESSION: ICD-10-CM

## 2020-04-16 RX ORDER — ESCITALOPRAM OXALATE 20 MG/1
TABLET ORAL
Qty: 90 TAB | Refills: 1 | Status: SHIPPED | OUTPATIENT
Start: 2020-04-16 | End: 2020-12-01

## 2020-12-01 DIAGNOSIS — F32.A ANXIETY AND DEPRESSION: ICD-10-CM

## 2020-12-01 DIAGNOSIS — F41.9 ANXIETY AND DEPRESSION: ICD-10-CM

## 2020-12-01 RX ORDER — ESCITALOPRAM OXALATE 20 MG/1
TABLET ORAL
Qty: 90 TAB | Refills: 1 | Status: SHIPPED | OUTPATIENT
Start: 2020-12-01

## 2021-04-08 ENCOUNTER — DOCUMENTATION ONLY (OUTPATIENT)
Dept: CARDIOLOGY CLINIC | Age: 51
End: 2021-04-08

## 2021-04-08 ENCOUNTER — ANCILLARY PROCEDURE (OUTPATIENT)
Dept: CARDIOLOGY CLINIC | Age: 51
End: 2021-04-08
Payer: COMMERCIAL

## 2021-04-08 VITALS — HEIGHT: 71 IN | WEIGHT: 250 LBS | BODY MASS INDEX: 35 KG/M2

## 2021-04-08 DIAGNOSIS — R07.9 CHEST PAIN, UNSPECIFIED TYPE: ICD-10-CM

## 2021-04-08 LAB
ECHO RV INTERNAL DIMENSION: 2.79 CM
STRESS ANGINA INDEX: 0
STRESS BASELINE DIAS BP: 84 MMHG
STRESS BASELINE HR: 82 BPM
STRESS BASELINE SYS BP: 136 MMHG
STRESS ESTIMATED WORKLOAD: 10.1 METS
STRESS EXERCISE DUR MIN: NORMAL
STRESS O2 SAT PEAK: 95 %
STRESS O2 SAT REST: 92 %
STRESS PEAK DIAS BP: 72 MMHG
STRESS PEAK SYS BP: 188 MMHG
STRESS PERCENT HR ACHIEVED: 96 %
STRESS POST PEAK HR: 164 BPM
STRESS RATE PRESSURE PRODUCT: NORMAL BPM*MMHG
STRESS ST DEPRESSION: 0 MM
STRESS ST ELEVATION: 0 MM
STRESS TARGET HR: 171 BPM

## 2021-04-08 PROCEDURE — 93351 STRESS TTE COMPLETE: CPT | Performed by: INTERNAL MEDICINE

## 2021-04-08 NOTE — PROGRESS NOTES
Mr. Both stress echo completed. Study in MUSE and chart being transferred to [facility]. Earney Brittle

## 2021-04-09 ENCOUNTER — OFFICE VISIT (OUTPATIENT)
Dept: CARDIOLOGY CLINIC | Age: 51
End: 2021-04-09
Payer: COMMERCIAL

## 2021-04-09 VITALS
HEART RATE: 63 BPM | OXYGEN SATURATION: 97 % | SYSTOLIC BLOOD PRESSURE: 114 MMHG | WEIGHT: 250.6 LBS | BODY MASS INDEX: 35.08 KG/M2 | RESPIRATION RATE: 14 BRPM | HEIGHT: 71 IN | DIASTOLIC BLOOD PRESSURE: 80 MMHG

## 2021-04-09 DIAGNOSIS — Z82.49 FAMILY HISTORY OF PREMATURE CAD: ICD-10-CM

## 2021-04-09 DIAGNOSIS — E66.9 OBESITY, CLASS I, BMI 30-34.9: ICD-10-CM

## 2021-04-09 DIAGNOSIS — I10 BENIGN ESSENTIAL HYPERTENSION: ICD-10-CM

## 2021-04-09 DIAGNOSIS — I10 HTN, GOAL BELOW 140/90: ICD-10-CM

## 2021-04-09 DIAGNOSIS — G47.33 OSA (OBSTRUCTIVE SLEEP APNEA): ICD-10-CM

## 2021-04-09 DIAGNOSIS — E78.5 DYSLIPIDEMIA: ICD-10-CM

## 2021-04-09 DIAGNOSIS — R10.9 ABDOMINAL PAIN DUE TO INJURY: ICD-10-CM

## 2021-04-09 DIAGNOSIS — I73.9 PAD (PERIPHERAL ARTERY DISEASE) (HCC): ICD-10-CM

## 2021-04-09 DIAGNOSIS — K40.90 DIRECT INGUINAL HERNIA OF LEFT SIDE: Primary | ICD-10-CM

## 2021-04-09 DIAGNOSIS — G47.52 REM SLEEP BEHAVIOR DISORDER: ICD-10-CM

## 2021-04-09 DIAGNOSIS — R06.09 DOE (DYSPNEA ON EXERTION): ICD-10-CM

## 2021-04-09 PROCEDURE — 99244 OFF/OP CNSLTJ NEW/EST MOD 40: CPT | Performed by: INTERNAL MEDICINE

## 2021-04-09 NOTE — PROGRESS NOTES
[place]: [facility]  [phone]  Requesting/referring provider: Dr. Breanna Romero  Reason for Consult: NATHAN    HPI: Layman Doris, a 54y.o. year-old who presents for evaluation of dyspnea on exertion and with bending over. 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 He feels like he is at the end of it and things are getting better and he will move on. He pulled some back and abd muscles, ? Hernia, nothing obvious on exam but the pain seems to be in Hesselbach's triangle and is getting worse not better. And getting up from the exam table today he was really in pain and took a couple of minutes to recover. Hernias are certainly not my specialty but I do think he needs further evaluation- paining him since last Tuesday. He was lifting tables at [employer] and overdid it. He is probably lifting more than he should be and we discussed the dangers of that    However his stress test today looked okay no evidence of significant blockages based on his normal echo images at stress. I am concerned about his future risk of heart disease because of his family history which is extremely concerning as well as his personal risk factors of obesity high stress life this is probably his biggest one as well as high blood pressure and high cholesterol. I think is best for him to target a weight loss of 50 pounds to get him to 190 to 200 pounds. I also think his LDL cholesterol should be optimally controlled to less than 70 because of his concerning family history. He is agreeable to this and prefers to come in once a year for general cardiovascular follow-up    Bp was running well at home. ON a great diet now and it is low sodium and counting points.    Weight is coming down and about 8-10  pounds so far in the last 3 weeks. He would like to get     Knee surgery 2 years ago, used to run bike, tennis etc. But now the knee is getting stronger and thinks he may be bel to go it. Also maybe Pickleball. Needs cholesterol checked. Will see Dr. Cheng Craig about that. Wife of Ruth Curry [facility], [place]    Assessment/Plan:  1. HTN at goal on Avapro  2. Dyslipidemia goal less than 70 on atorvastatin  3. Body mass index is 34.95 kg/m². Working on diet exercise and weight loss  4. ALANNA on CPAP wth a sleep disorder and can be active in the night. 5.  Abdominal pain/? Hernia we will get a CT scan of the belly and have him evaluated by general surgery-he is exquisitely tender and needs further evaluation by someone who knows more about hernias. He is not sure if he would be able to get in with Dr. Tavia Eldridge in the next few days although certainly that would be a great option too    Fhx dad with stent in his 45s with HTN and mid-late 46s, emergency heart sugery, CABG with Aortic tear, Parkinson's Lewy dementia, brother is 46 and has HTN has stents- thinks [place], also had an aneurysm, dad was not a smoker  Soc no tob, rare etoh    He  has a past medical history of Adverse effect of anesthesia, Anxiety, Arthritis, Complex tear of medial meniscus of left knee (3/28/2019), GERD (gastroesophageal reflux disease), HTN, goal below 140/90 (10/09/2011), Hyperlipidemia (10/9/2011), Lipoma (10/9/2011), and Sleep apnea. Cardiovascular ROS: positive for - chest pain  Respiratory ROS: no cough, shortness of breath, or wheezing +NATHAN  Neurological ROS: no TIA or stroke symptoms  All other systems negative except as above. PE  Vitals:    04/09/21 1303   BP: 114/80   Pulse: 63   Resp: 14   SpO2: 97%   Weight: 250 lb 9.6 oz (113.7 kg)   Height: 5' 11\" (1.803 m)    Body mass index is 34.95 kg/m².    General appearance - alert, well appearing, and in no distress  Mental status - affect appropriate to mood  Eyes - sclera anicteric, moist mucous membranes  Neck - supple, no significant adenopathy  Lymphatics - no  lymphadenopathy  Chest - clear to auscultation, no wheezes, rales or rhonchi  Heart - normal rate, regular rhythm, normal S1, S2, no murmurs, rubs, clicks or gallops  Back exam - full range of motion, no tenderness  Neurological - cranial nerves II through XII grossly intact, no focal deficit  Musculoskeletal - no muscular tenderness noted, normal strength  Extremities - peripheral pulses normal, no pedal edema  Skin - normal coloration  no rashes    Recent Labs:  No results found for: CHOL, CHOLX, CHLST, CHOLV, 867073, HDL, HDLP, LDL, LDLC, DLDLP, TGLX, TRIGL, TRIGP, CHHD, CHHDX  Lab Results   Component Value Date/Time    Creatinine 1.40 (H) 06/27/2019 03:11 PM     Lab Results   Component Value Date/Time    BUN 20 06/27/2019 03:11 PM     Lab Results   Component Value Date/Time    Potassium 3.7 06/27/2019 03:11 PM     No results found for: HBA1C, HGBE8, SRY8JOTT  Lab Results   Component Value Date/Time    HGB 13.6 06/27/2019 03:11 PM     Lab Results   Component Value Date/Time    PLATELET 375 92/91/7049 03:11 PM       Reviewed:  Past Medical History:   Diagnosis Date    Adverse effect of anesthesia     TAKES A LONG TIME TO WAKE UP    Anxiety     Arthritis     Complex tear of medial meniscus of left knee 3/28/2019    GERD (gastroesophageal reflux disease)     HTN, goal below 140/90 10/09/2011    Hyperlipidemia 10/9/2011    Lipoma 10/9/2011    Sleep apnea     CPAP      Social History     Tobacco Use   Smoking Status Never Smoker   Smokeless Tobacco Never Used     Social History     Substance and Sexual Activity   Alcohol Use Yes    Frequency: Monthly or less    Comment: 1-2 drinks a month     Allergies   Allergen Reactions    Other Food Other (comments)     PEANUTS: NAUSEA AND UPSET STOMACH       Current Outpatient Medications   Medication Sig    escitalopram oxalate (LEXAPRO) 20 mg tablet TAKE 1 TABLET BY MOUTH DAILY    irbesartan (AVAPRO) 300 mg tablet TAKE 1 TABLET BY MOUTH DAILY    aspirin delayed-release 81 mg tablet Take 4 Tabs by mouth two (2) times a day. (Patient taking differently: Take 81 mg by mouth daily.)    atorvastatin (LIPITOR) 20 mg tablet Take 20 mg by mouth daily.  loratadine (CLARITIN) 10 mg tablet Take 10 mg by mouth daily.  trimethoprim-polymyxin b (POLYTRIM) ophthalmic solution 2 drops four times daily to affected eye for 7 days     No current facility-administered medications for this visit.         Sarah Sanders MD  [facility] [place]  [address] [facility] [address]  [facility], [address]

## 2021-04-12 ENCOUNTER — HOSPITAL ENCOUNTER (OUTPATIENT)
Dept: CT IMAGING | Age: 51
Discharge: HOME | End: 2021-04-12
Attending: INTERNAL MEDICINE
Payer: COMMERCIAL

## 2021-04-12 DIAGNOSIS — K40.90 DIRECT INGUINAL HERNIA OF LEFT SIDE: ICD-10-CM

## 2021-04-12 PROCEDURE — 74011000636 HC RX REV CODE- 636: Performed by: INTERNAL MEDICINE

## 2021-04-12 PROCEDURE — 74178 CT ABD&PLV WO CNTR FLWD CNTR: CPT

## 2021-04-12 PROCEDURE — 74177 CT ABD & PELVIS W/CONTRAST: CPT

## 2021-04-12 RX ADMIN — IOHEXOL 50 ML: 240 INJECTION, SOLUTION INTRATHECAL; INTRAVASCULAR; INTRAVENOUS; ORAL at 13:00

## 2021-04-12 RX ADMIN — IOPAMIDOL 100 ML: 755 INJECTION, SOLUTION INTRAVENOUS at 13:00

## 2021-04-14 ENCOUNTER — OFFICE VISIT (OUTPATIENT)
Dept: SURGERY | Age: 51
End: 2021-04-14
Payer: COMMERCIAL

## 2021-04-14 VITALS
RESPIRATION RATE: 18 BRPM | BODY MASS INDEX: 35.06 KG/M2 | HEIGHT: 71 IN | SYSTOLIC BLOOD PRESSURE: 126 MMHG | WEIGHT: 250.4 LBS | DIASTOLIC BLOOD PRESSURE: 73 MMHG | HEART RATE: 82 BPM | TEMPERATURE: 99.3 F

## 2021-04-14 DIAGNOSIS — R10.32 INGUINODYNIA, LEFT: Primary | ICD-10-CM

## 2021-04-14 PROCEDURE — 99204 OFFICE O/P NEW MOD 45 MIN: CPT | Performed by: SURGERY

## 2021-04-14 NOTE — PROGRESS NOTES
1. Have you been to the ER, urgent care clinic since your last visit? Hospitalized since your last visit? no    2. Have you seen or consulted any other health care providers outside of the [address] [place] [address] since your last visit? Include any pap smears or colon screening.  no

## 2021-04-14 NOTE — PROGRESS NOTES
General Surgery Office Consultation / H & P    CC: Groin pain  History of Present Illness:      Ruben Ibanez is a 54 y.o. male who presents with left groin pain. Patient reports that for the last few weeks after lifting furniture he has had severe left groin pain. Also associated back pain. Pain can be dull in nature with also shooting pain down into his scrotum. Pain can be 8 out of 10. Ambulation and sitting down can provoke the pain. Ice helps the pain along with pain medication. He had a work-up with a CT scan showing no obvious left inguinal hernia. The patient presents today to discuss any surgical intervention. Does not smoke. No prior abdominal surgeries. No prior hernia repairs. Otherwise in good health. History of colonoscopy at a younger age secondary to diarrhea associated with traveling.     Past Medical History:   Diagnosis Date    Adverse effect of anesthesia     TAKES A LONG TIME TO WAKE UP    Anxiety     Arthritis     Complex tear of medial meniscus of left knee 3/28/2019    Depression     GERD (gastroesophageal reflux disease)     HTN, goal below 140/90 10/09/2011    Hyperlipidemia 10/9/2011    Lipoma 10/9/2011    Sleep apnea     CPAP      Past Surgical History:   Procedure Laterality Date    HX ORTHOPAEDIC Left 03/28/2019    ACL replaced      Family History   Problem Relation Age of Onset    Cancer Mother         BREAST    Diabetes Mother         type 2    Hypertension Mother     Hypertension Father     Heart Attack Father         64    No Known Problems Sister     Deep Vein Thrombosis Brother     Deep Vein Thrombosis Other     Anesth Problems Neg Hx      Social History     Socioeconomic History    Marital status:      Spouse name: Not on file    Number of children: Not on file    Years of education: Not on file    Highest education level: Not on file   Tobacco Use    Smoking status: Never Smoker    Smokeless tobacco: Never Used   Substance and Sexual Activity    Alcohol use: Yes     Frequency: Monthly or less     Comment: 1-2 drinks a month    Drug use: Never    Sexual activity: Yes     Partners: Female      Prior to Admission medications    Medication Sig Start Date End Date Taking? Authorizing Provider   escitalopram oxalate (LEXAPRO) 20 mg tablet TAKE 1 TABLET BY MOUTH DAILY 12/1/20  Yes Johanna Rojas MD   irbesartan (AVAPRO) 300 mg tablet TAKE 1 TABLET BY MOUTH DAILY 8/3/20  Yes Johanna Rojas MD   aspirin delayed-release 81 mg tablet Take 4 Tabs by mouth two (2) times a day. Patient taking differently: Take 81 mg by mouth daily. 3/28/19  Yes Jesus Gee MD   atorvastatin (LIPITOR) 20 mg tablet Take 20 mg by mouth daily. Yes Provider, Historical   loratadine (CLARITIN) 10 mg tablet Take 10 mg by mouth daily.    Yes Provider, Historical   trimethoprim-polymyxin b (POLYTRIM) ophthalmic solution 2 drops four times daily to affected eye for 7 days 1/14/20   Johanna Rojas MD     Allergies   Allergen Reactions    Other Food Other (comments)     PEANUTS: NAUSEA AND UPSET STOMACH       Review of Systems:  Constitutional: No fever or chills  Neurologic: No headache  Eyes: No scleral icterus or irritated eyes  Nose: No nasal pain or drainage  Mouth: No oral lesions or sore throat  Cardiac: No palpations or chest pain  Pulmonary: No cough or shortness or breath  Gastrointestinal: Left groin pain  Genitourinary: No dysuria  Musculoskeletal: No muscle or joint tenderness  Skin: No rashes or lesions  Psychiatric: No anxiety or depressed mood    Physical Exam:     Visit Vitals  /73   Pulse 82   Temp 99.3 °F (37.4 °C) (Oral)   Resp 18   Ht 5' 11\" (1.803 m)   Wt 250 lb 6.4 oz (113.6 kg)   BMI 34.92 kg/m²     General: No acute distress, conversant  Eyes: PERRLA, no scleral icterus  HENT: Normocephalic without oral lesions  Neck: Trachea midline without LAD  Cardiac: Normal pulse rate and rhythm  Pulmonary: Symmetric chest rise with normal effort  GI: Soft, NT, ND, no splenomegaly, no right inguinal hernia, possible left tiny inguinal hernia, small umbilical hernia  Skin: Warm without rash  Extremities: No edema or joint stiffness  Psych: Appropriate mood and affect    Assessment:     79-year-old male with likely groin strain after lifting heavy furniture with small umbilical and possible small left inguinal hernia    Plan:     Patient and I had a long discussion along with reviewing the imaging with the patient. CT scan read negative for any acute finding. On review there is a small umbilical hernia and likely a small left direct inguinal hernia. I do not feel like his symptoms correlate to the imaging. I encouraged the patient to give this time along with stretching and passive exercise to allow this to heal.  Associated back injury as well from lifting. Patient is to call me with any progression in symptoms or if he would want his umbilical hernia repaired in the future. Follow up PRN    Total time involved with this patient's care was: 45 minutes. This involved reviewing patient record, talking with patient, and charting on patient.     Signed By: Carmelina Wynne MD  Bariatric and General Surgeon  [facility]    April 14, 2021

## 2021-05-12 ENCOUNTER — ANCILLARY PROCEDURE (OUTPATIENT)
Dept: CARDIOLOGY CLINIC | Age: 51
End: 2021-05-12

## 2021-05-12 ENCOUNTER — ANCILLARY PROCEDURE (OUTPATIENT)
Dept: CARDIOLOGY CLINIC | Age: 51
End: 2021-05-12
Payer: COMMERCIAL

## 2021-05-12 DIAGNOSIS — I73.9 PAD (PERIPHERAL ARTERY DISEASE) (HCC): ICD-10-CM

## 2021-05-12 LAB
ABI POST MINUTES1: 2 MIN
ABI POST MINUTES2: 6 MIN
IMMEDIATE ARM BP: 166 MMHG
IMMEDIATE LEFT ABI: 0.99
IMMEDIATE LEFT TIBIAL: 164 MMHG
IMMEDIATE RIGHT ABI: 0.9
IMMEDIATE RIGHT TIBIAL: 149 MMHG
LEFT ABI: 1.24
LEFT ANTERIOR TIBIAL: 173 MMHG
LEFT ARM BP: 139 MMHG
LEFT CCA DIST DIAS: 23.2 CENTIMETER/SECOND
LEFT CCA DIST SYS: 87 CENTIMETER/SECOND
LEFT CCA PROX DIAS: 20.2 CENTIMETER/SECOND
LEFT CCA PROX SYS: 107.7 CENTIMETER/SECOND
LEFT ECA DIAS: 19.48 CENTIMETER/SECOND
LEFT ECA SYS: 101.9 CENTIMETER/SECOND
LEFT ICA DIST DIAS: 17.9 CENTIMETER/SECOND
LEFT ICA DIST SYS: 48.2 CENTIMETER/SECOND
LEFT ICA MID DIAS: 18.1 CENTIMETER/SECOND
LEFT ICA MID SYS: 47.3 CENTIMETER/SECOND
LEFT ICA PROX DIAS: 25.6 CENTIMETER/SECOND
LEFT ICA PROX SYS: 99.2 CENTIMETER/SECOND
LEFT ICA/CCA SYS: 0.92
LEFT POSTERIOR TIBIAL: 170 MMHG
LEFT VERTEBRAL DIAS: 13.04 CENTIMETER/SECOND
LEFT VERTEBRAL SYS: 37.8 CENTIMETER/SECOND
POST1 ARM BP: 156 MMHG
POST1 LEFT ABI: 0.97
POST1 LEFT TIBIAL: 152 MMHG
POST1 RIGHT ABI: 1.02
POST1 RIGHT TIBIAL: 159 MMHG
POST2 ARM BP: 153 MMHG
POST2 LEFT ABI: 1.02
POST2 LEFT TIBIAL: 156 MMHG
POST2 RIGHT ABI: 1.01
POST2 RIGHT TIBIAL: 155 MMHG
RIGHT ABI: 1.19
RIGHT ANTERIOR TIBIAL: 162 MMHG
RIGHT ARM BP: 135 MMHG
RIGHT CCA DIST DIAS: 26.9 CENTIMETER/SECOND
RIGHT CCA DIST SYS: 91.7 CENTIMETER/SECOND
RIGHT CCA PROX DIAS: 18.1 CENTIMETER/SECOND
RIGHT CCA PROX SYS: 95.6 CENTIMETER/SECOND
RIGHT ECA DIAS: 17.4 CENTIMETER/SECOND
RIGHT ECA SYS: 86.1 CENTIMETER/SECOND
RIGHT ICA DIST DIAS: 22.7 CENTIMETER/SECOND
RIGHT ICA DIST SYS: 55.8 CENTIMETER/SECOND
RIGHT ICA MID DIAS: 19.6 CENTIMETER/SECOND
RIGHT ICA MID SYS: 47.3 CENTIMETER/SECOND
RIGHT ICA PROX DIAS: 24.2 CENTIMETER/SECOND
RIGHT ICA PROX SYS: 86.4 CENTIMETER/SECOND
RIGHT ICA/CCA SYS: 0.9
RIGHT POSTERIOR TIBIAL: 165 MMHG
RIGHT VERTEBRAL DIAS: 9.21 CENTIMETER/SECOND
RIGHT VERTEBRAL SYS: 24.7 CENTIMETER/SECOND
TREADMILL GRADE: 10 %
TREADMILL SPEED: 2 MPH
TREADMILL TIME: 5 MIN

## 2021-05-12 PROCEDURE — 93880 EXTRACRANIAL BILAT STUDY: CPT | Performed by: INTERNAL MEDICINE

## 2021-05-12 PROCEDURE — 93922 UPR/L XTREMITY ART 2 LEVELS: CPT | Performed by: INTERNAL MEDICINE

## 2021-05-13 ENCOUNTER — TELEPHONE (OUTPATIENT)
Dept: CARDIOLOGY CLINIC | Age: 51
End: 2021-05-13

## 2021-05-13 NOTE — TELEPHONE ENCOUNTER
----- Message from Amanda Kim NP sent at 5/13/2021 10:33 AM EDT -----  Carotid duplex did not show any carotid plaque or stenosis      Above Carotid results given to patient.  2 pt identifiers used

## 2021-05-13 NOTE — TELEPHONE ENCOUNTER
----- Message from Dandy Bursn NP sent at 5/13/2021 10:31 AM EDT -----  AIDEN ok, no evidence of PAD      Above AIDEN results given to patient.  2 pt identifiers used

## 2021-05-21 DIAGNOSIS — F32.A ANXIETY AND DEPRESSION: ICD-10-CM

## 2021-05-21 DIAGNOSIS — F41.9 ANXIETY AND DEPRESSION: ICD-10-CM

## 2021-05-21 NOTE — TELEPHONE ENCOUNTER
Request for refill. Will also route to front office to contact patient to schedule appt. Thanks, Minesh Cornejo    Last Visit: 3/2/20 NP Kaylin  Next Appointment: Not scheduled- appt due! Previous Refill Encounter(s): 12/1/20 90 + 1    Requested Prescriptions     Pending Prescriptions Disp Refills    escitalopram oxalate (LEXAPRO) 20 mg tablet 90 Tablet 0     Sig: Take 1 Tablet by mouth daily. Appointment due!

## 2021-05-24 RX ORDER — ESCITALOPRAM OXALATE 20 MG/1
20 TABLET ORAL DAILY
Qty: 90 TABLET | Refills: 0 | OUTPATIENT
Start: 2021-05-24

## 2021-12-14 NOTE — PROGRESS NOTES
Contacted patient just to inform him that his lambda light chains have slightly gone up from 329 to 356, however his light chain ratio continues to trend down from 0.02 now to 0.01.  Patient appreciative of the phone call.    Patient did inform you that he is having a procedure this coming Thursday, 12/16/2021 for the radioactive seed placement and on Friday with Dr. Isaac on 12/17/2021 to have them removed for his prostate cancer.    Patient is supposed to start his Revlimid cycle day 1 on this Thursday, 12/16/2021.  Will discuss with Dr. Aaron and ensure that it is okay to start his cycle as planned and will inform patient if he is to hold the start date of his Revlimid cycle.        Results for JUANITA JAMES SANCHES (MRN [id number])    Ref. Range 12/9/2021 15:59   Free Kappa Light Chains Latest Ref Range: 3.30 - 19.40 mg/L 4.52   Free Lambda Light Chains Latest Ref Range: 5.71 - 26.30 mg/L 356.42 (H)   Kappa-Lambda Ratio Latest Ref Range: 0.26 - 1.65  0.01 (L)      Patient Name: Kacie Patton   MRN: [id number]    Lauren Duarte is a 54 y.o. male who presents with the following:     Patient has been on Celexa 40 mg for many years for anxiety and depression. Here today as he states that he is having some anger management issues, especially towards his teenagers. He previously tried Wellbutrin with Celexa which affected his sleep. States that he has also been on Zoloft and Lexapro in the past; is interested in trying Lexapro again. Ambivalent about therapy at this time. Review of Systems   Constitutional: Negative for fever, malaise/fatigue and weight loss. Respiratory: Negative for cough, hemoptysis, shortness of breath and wheezing. Cardiovascular: Negative for chest pain, palpitations, leg swelling and PND. Gastrointestinal: Negative for abdominal pain, constipation, diarrhea, nausea and vomiting. Psychiatric/Behavioral: Positive for depression. Negative for suicidal ideas. The patient is nervous/anxious. The patient's medications, allergies, past medical history, surgical history, family history and social history were reviewed and updated where appropriate. Prior to Admission medications    Medication Sig Start Date End Date Taking? Authorizing Provider   irbesartan (AVAPRO) 300 mg tablet Take 1 Tab by mouth daily. 6/7/19  Yes Tyrone Powers MD   citalopram (CELEXA) 40 mg tablet Take 1 Tab by mouth daily. 6/7/19  Yes Tyrone Powers MD   aspirin delayed-release 81 mg tablet Take 4 Tabs by mouth two (2) times a day. Patient taking differently: Take 81 mg by mouth daily. 3/28/19  Yes Mina Esteban MD   atorvastatin (LIPITOR) 20 mg tablet Take 20 mg by mouth daily. Yes Provider, Historical   mometasone (NASONEX) 50 mcg/actuation nasal spray 2 Sprays by Both Nostrils route daily. Yes Provider, Historical   loratadine (CLARITIN) 10 mg tablet Take 10 mg by mouth daily.    Yes Provider, Historical       Allergies   Allergen Reactions  Other Food Other (comments)     PEANUTS: NAUSEA AND UPSET STOMACH           OBJECTIVE    Visit Vitals  /72 (BP 1 Location: Right arm, BP Patient Position: Sitting)   Pulse 81   Temp 97.7 °F (36.5 °C) (Oral)   Resp 18   Ht 5' 11\" (1.803 m)   Wt 248 lb 9.6 oz (112.8 kg)   SpO2 96%   BMI 34.67 kg/m²       Physical Exam   Constitutional: He is oriented to person, place, and time and well-developed, well-nourished, and in no distress. No distress. Eyes: Pupils are equal, round, and reactive to light. Conjunctivae and EOM are normal.   Musculoskeletal: Normal range of motion. Neurological: He is alert and oriented to person, place, and time. Gait normal.   Skin: Skin is warm and dry. He is not diaphoretic. Psychiatric: Mood, memory, affect and judgment normal.   Nursing note and vitals reviewed. ASSESSMENT AND PLAN  Maricarmen Jones is a 54 y.o. male who presents today for:    1. Anxiety and depression  Will wean down Celexa and titrate up with Lexapro. Consider therapy. - escitalopram oxalate (LEXAPRO) 10 mg tablet; Take 1 Tab by mouth daily. Dispense: 30 Tab; Refill: 2  - citalopram (CELEXA) 20 mg tablet; Take 1 Tab by mouth daily. Dispense: 30 Tab; Refill: 2       Medications Discontinued During This Encounter   Medication Reason    citalopram (CELEXA) 40 mg tablet Reorder       Follow-up and Dispositions    · Return in about 1 month (around 7/30/2019) for Medication Check. Time: 25 minutes was spent with this patient face to face discussing test results, follow up visits, and when repeat testing. I discussed diagnoses, risk factors and treatment for each based on current recommendations and literature. Greater than 50% of total visit time was spent in counseling and coordination of care. Medication risks/benefits/costs/interactions/alternatives discussed with patient.   Advised patient to call back or return to office if symptoms worsen/change/persist. If patient cannot reach us or should anything more severe/urgent arise he/she should proceed directly to the nearest emergency department. Discussed expected course/resolution/complications of diagnosis in detail with patient. Patient given a written after visit summary which includes his/her diagnoses, current medications and vitals. Patient expressed understanding with the diagnosis and plan. Sara Ferrell M.D.

## 2022-03-18 PROBLEM — R10.32 INGUINODYNIA, LEFT: Status: ACTIVE | Noted: 2021-04-14

## 2022-03-19 PROBLEM — S83.232A COMPLEX TEAR OF MEDIAL MENISCUS OF LEFT KNEE: Status: ACTIVE | Noted: 2019-03-28

## 2022-11-07 ENCOUNTER — HOSPITAL ENCOUNTER (EMERGENCY)
Age: 52
Discharge: STILL ADMITTED OR ADMINISTRATIVE | End: 2022-11-07

## 2022-11-07 ENCOUNTER — TRANSCRIBE ORDER (OUTPATIENT)
Dept: SCHEDULING | Age: 52
End: 2022-11-07

## 2022-11-07 ENCOUNTER — HOSPITAL ENCOUNTER (OUTPATIENT)
Dept: ULTRASOUND IMAGING | Age: 52
Discharge: HOME | End: 2022-11-07
Attending: ORTHOPAEDIC SURGERY
Payer: COMMERCIAL

## 2022-11-07 DIAGNOSIS — M79.89 RIGHT LEG SWELLING: ICD-10-CM

## 2022-11-07 DIAGNOSIS — M79.661 RIGHT CALF PAIN: Primary | ICD-10-CM

## 2022-11-07 DIAGNOSIS — M79.661 RIGHT CALF PAIN: ICD-10-CM

## 2022-11-07 DIAGNOSIS — S93.401D SPRAIN OF LIGAMENT OF RIGHT ANKLE, SUBSEQUENT ENCOUNTER: Primary | ICD-10-CM

## 2022-11-07 PROCEDURE — 93971 EXTREMITY STUDY: CPT

## 2022-11-08 ENCOUNTER — HOSPITAL ENCOUNTER (OUTPATIENT)
Dept: MRI IMAGING | Age: 52
Discharge: HOME | End: 2022-11-08
Attending: ORTHOPAEDIC SURGERY
Payer: COMMERCIAL

## 2022-11-08 DIAGNOSIS — S93.401D SPRAIN OF LIGAMENT OF RIGHT ANKLE, SUBSEQUENT ENCOUNTER: ICD-10-CM

## 2022-11-08 PROCEDURE — 73721 MRI JNT OF LWR EXTRE W/O DYE: CPT

## 2022-11-09 ENCOUNTER — HOSPITAL ENCOUNTER (INPATIENT)
Age: 52
LOS: 5 days | Discharge: HOME HEALTH | DRG: 494 | End: 2022-11-14
Attending: EMERGENCY MEDICINE | Admitting: HOSPITALIST
Payer: COMMERCIAL

## 2022-11-09 ENCOUNTER — ANESTHESIA (OUTPATIENT)
Dept: SURGERY | Age: 52
DRG: 494 | End: 2022-11-09
Payer: COMMERCIAL

## 2022-11-09 ENCOUNTER — ANESTHESIA EVENT (OUTPATIENT)
Dept: SURGERY | Age: 52
DRG: 494 | End: 2022-11-09
Payer: COMMERCIAL

## 2022-11-09 DIAGNOSIS — M00.271 STREPTOCOCCAL ARTHRITIS OF RIGHT ANKLE (HCC): ICD-10-CM

## 2022-11-09 DIAGNOSIS — D75.839 THROMBOCYTOSIS: ICD-10-CM

## 2022-11-09 DIAGNOSIS — N17.9 AKI (ACUTE KIDNEY INJURY) (HCC): ICD-10-CM

## 2022-11-09 DIAGNOSIS — M00.9 SEPTIC ARTHRITIS OF RIGHT ANKLE, DUE TO UNSPECIFIED ORGANISM (HCC): Primary | ICD-10-CM

## 2022-11-09 LAB
ALBUMIN SERPL-MCNC: 3.3 G/DL (ref 3.5–5)
ALBUMIN/GLOB SERPL: 0.7 {RATIO} (ref 1.1–2.2)
ALP SERPL-CCNC: 93 U/L (ref 45–117)
ALT SERPL-CCNC: 49 U/L (ref 12–78)
ANION GAP SERPL CALC-SCNC: 6 MMOL/L (ref 5–15)
AST SERPL-CCNC: 24 U/L (ref 15–37)
BASOPHILS # BLD: 0 K/UL (ref 0–0.1)
BASOPHILS NFR BLD: 0 % (ref 0–1)
BILIRUB SERPL-MCNC: 0.6 MG/DL (ref 0.2–1)
BUN SERPL-MCNC: 22 MG/DL (ref 6–20)
BUN/CREAT SERPL: 17 (ref 12–20)
CALCIUM SERPL-MCNC: 10.1 MG/DL (ref 8.5–10.1)
CHLORIDE SERPL-SCNC: 99 MMOL/L (ref 97–108)
CO2 SERPL-SCNC: 29 MMOL/L (ref 21–32)
COMMENT, HOLDF: NORMAL
COMMENT, HOLDF: NORMAL
CREAT SERPL-MCNC: 1.32 MG/DL (ref 0.7–1.3)
CRP SERPL-MCNC: 16.2 MG/DL (ref 0–0.6)
DIFFERENTIAL METHOD BLD: ABNORMAL
EOSINOPHIL # BLD: 0.1 K/UL (ref 0–0.4)
EOSINOPHIL NFR BLD: 2 % (ref 0–7)
ERYTHROCYTE [DISTWIDTH] IN BLOOD BY AUTOMATED COUNT: 13.5 % (ref 11.5–14.5)
ERYTHROCYTE [SEDIMENTATION RATE] IN BLOOD: 90 MM/HR (ref 0–20)
GLOBULIN SER CALC-MCNC: 4.9 G/DL (ref 2–4)
GLUCOSE SERPL-MCNC: 99 MG/DL (ref 65–100)
HCT VFR BLD AUTO: 37.8 % (ref 36.6–50.3)
HGB BLD-MCNC: 12.1 G/DL (ref 12.1–17)
IMM GRANULOCYTES # BLD AUTO: 0 K/UL (ref 0–0.04)
IMM GRANULOCYTES NFR BLD AUTO: 1 % (ref 0–0.5)
LACTATE BLD-SCNC: 0.82 MMOL/L (ref 0.4–2)
LYMPHOCYTES # BLD: 1.3 K/UL (ref 0.8–3.5)
LYMPHOCYTES NFR BLD: 15 % (ref 12–49)
MCH RBC QN AUTO: 27.6 PG (ref 26–34)
MCHC RBC AUTO-ENTMCNC: 32 G/DL (ref 30–36.5)
MCV RBC AUTO: 86.3 FL (ref 80–99)
MONOCYTES # BLD: 0.5 K/UL (ref 0–1)
MONOCYTES NFR BLD: 6 % (ref 5–13)
NEUTS SEG # BLD: 6.6 K/UL (ref 1.8–8)
NEUTS SEG NFR BLD: 76 % (ref 32–75)
NRBC # BLD: 0 K/UL (ref 0–0.01)
NRBC BLD-RTO: 0 PER 100 WBC
PLATELET # BLD AUTO: 498 K/UL (ref 150–400)
PMV BLD AUTO: 8.3 FL (ref 8.9–12.9)
POTASSIUM SERPL-SCNC: 4.1 MMOL/L (ref 3.5–5.1)
PROT SERPL-MCNC: 8.2 G/DL (ref 6.4–8.2)
RBC # BLD AUTO: 4.38 M/UL (ref 4.1–5.7)
SAMPLES BEING HELD,HOLD: NORMAL
SAMPLES BEING HELD,HOLD: NORMAL
SODIUM SERPL-SCNC: 134 MMOL/L (ref 136–145)
WBC # BLD AUTO: 8.6 K/UL (ref 4.1–11.1)

## 2022-11-09 PROCEDURE — 74011250636 HC RX REV CODE- 250/636: Performed by: ANESTHESIOLOGY

## 2022-11-09 PROCEDURE — 77030008684 HC TU ET CUF COVD -B: Performed by: NURSE ANESTHETIST, CERTIFIED REGISTERED

## 2022-11-09 PROCEDURE — 77030002933 HC SUT MCRYL J&J -A: Performed by: ORTHOPAEDIC SURGERY

## 2022-11-09 PROCEDURE — 96374 THER/PROPH/DIAG INJ IV PUSH: CPT

## 2022-11-09 PROCEDURE — 77030040361 HC SLV COMPR DVT MDII -B

## 2022-11-09 PROCEDURE — 65270000029 HC RM PRIVATE

## 2022-11-09 PROCEDURE — 77030002916 HC SUT ETHLN J&J -A: Performed by: ORTHOPAEDIC SURGERY

## 2022-11-09 PROCEDURE — 77030040922 HC BLNKT HYPOTHRM STRY -A

## 2022-11-09 PROCEDURE — 0S9F0ZZ DRAINAGE OF RIGHT ANKLE JOINT, OPEN APPROACH: ICD-10-PCS | Performed by: ORTHOPAEDIC SURGERY

## 2022-11-09 PROCEDURE — 77030013079 HC BLNKT BAIR HGGR 3M -A: Performed by: NURSE ANESTHETIST, CERTIFIED REGISTERED

## 2022-11-09 PROCEDURE — 74011000250 HC RX REV CODE- 250: Performed by: NURSE ANESTHETIST, CERTIFIED REGISTERED

## 2022-11-09 PROCEDURE — 74011250636 HC RX REV CODE- 250/636: Performed by: NURSE ANESTHETIST, CERTIFIED REGISTERED

## 2022-11-09 PROCEDURE — 36415 COLL VENOUS BLD VENIPUNCTURE: CPT

## 2022-11-09 PROCEDURE — 83605 ASSAY OF LACTIC ACID: CPT

## 2022-11-09 PROCEDURE — 74011250636 HC RX REV CODE- 250/636: Performed by: HOSPITALIST

## 2022-11-09 PROCEDURE — 87205 SMEAR GRAM STAIN: CPT

## 2022-11-09 PROCEDURE — 74011250636 HC RX REV CODE- 250/636: Performed by: EMERGENCY MEDICINE

## 2022-11-09 PROCEDURE — 76060000032 HC ANESTHESIA 0.5 TO 1 HR: Performed by: ORTHOPAEDIC SURGERY

## 2022-11-09 PROCEDURE — 80053 COMPREHEN METABOLIC PANEL: CPT

## 2022-11-09 PROCEDURE — 87075 CULTR BACTERIA EXCEPT BLOOD: CPT

## 2022-11-09 PROCEDURE — 87147 CULTURE TYPE IMMUNOLOGIC: CPT

## 2022-11-09 PROCEDURE — 99285 EMERGENCY DEPT VISIT HI MDM: CPT

## 2022-11-09 PROCEDURE — 74011250636 HC RX REV CODE- 250/636: Performed by: PHYSICIAN ASSISTANT

## 2022-11-09 PROCEDURE — 74011250636 HC RX REV CODE- 250/636: Performed by: ORTHOPAEDIC SURGERY

## 2022-11-09 PROCEDURE — 74011000250 HC RX REV CODE- 250: Performed by: ORTHOPAEDIC SURGERY

## 2022-11-09 PROCEDURE — 77030031139 HC SUT VCRL2 J&J -A: Performed by: ORTHOPAEDIC SURGERY

## 2022-11-09 PROCEDURE — 76010000138 HC OR TIME 0.5 TO 1 HR: Performed by: ORTHOPAEDIC SURGERY

## 2022-11-09 PROCEDURE — 87040 BLOOD CULTURE FOR BACTERIA: CPT

## 2022-11-09 PROCEDURE — 0S9H0ZZ DRAINAGE OF RIGHT TARSAL JOINT, OPEN APPROACH: ICD-10-PCS | Performed by: ORTHOPAEDIC SURGERY

## 2022-11-09 PROCEDURE — 76210000016 HC OR PH I REC 1 TO 1.5 HR: Performed by: ORTHOPAEDIC SURGERY

## 2022-11-09 PROCEDURE — 85652 RBC SED RATE AUTOMATED: CPT

## 2022-11-09 PROCEDURE — 74011000258 HC RX REV CODE- 258: Performed by: EMERGENCY MEDICINE

## 2022-11-09 PROCEDURE — 85025 COMPLETE CBC W/AUTO DIFF WBC: CPT

## 2022-11-09 PROCEDURE — 74011250637 HC RX REV CODE- 250/637: Performed by: ORTHOPAEDIC SURGERY

## 2022-11-09 PROCEDURE — 2709999900 HC NON-CHARGEABLE SUPPLY: Performed by: ORTHOPAEDIC SURGERY

## 2022-11-09 PROCEDURE — 86140 C-REACTIVE PROTEIN: CPT

## 2022-11-09 PROCEDURE — 77030000032 HC CUF TRNQT ZIMM -B: Performed by: ORTHOPAEDIC SURGERY

## 2022-11-09 RX ORDER — SODIUM CHLORIDE 0.9 % (FLUSH) 0.9 %
5-40 SYRINGE (ML) INJECTION EVERY 8 HOURS
Status: DISCONTINUED | OUTPATIENT
Start: 2022-11-09 | End: 2022-11-09 | Stop reason: SDUPTHER

## 2022-11-09 RX ORDER — PROPOFOL 10 MG/ML
INJECTION, EMULSION INTRAVENOUS AS NEEDED
Status: DISCONTINUED | OUTPATIENT
Start: 2022-11-09 | End: 2022-11-09 | Stop reason: HOSPADM

## 2022-11-09 RX ORDER — SODIUM CHLORIDE 9 MG/ML
125 INJECTION, SOLUTION INTRAVENOUS CONTINUOUS
Status: DISPENSED | OUTPATIENT
Start: 2022-11-09 | End: 2022-11-10

## 2022-11-09 RX ORDER — AMOXICILLIN 250 MG
1 CAPSULE ORAL 2 TIMES DAILY
Status: DISCONTINUED | OUTPATIENT
Start: 2022-11-09 | End: 2022-11-14 | Stop reason: HOSPADM

## 2022-11-09 RX ORDER — MIDAZOLAM HYDROCHLORIDE 1 MG/ML
INJECTION, SOLUTION INTRAMUSCULAR; INTRAVENOUS AS NEEDED
Status: DISCONTINUED | OUTPATIENT
Start: 2022-11-09 | End: 2022-11-09 | Stop reason: HOSPADM

## 2022-11-09 RX ORDER — ONDANSETRON 2 MG/ML
4 INJECTION INTRAMUSCULAR; INTRAVENOUS AS NEEDED
Status: DISCONTINUED | OUTPATIENT
Start: 2022-11-09 | End: 2022-11-09 | Stop reason: HOSPADM

## 2022-11-09 RX ORDER — ROCURONIUM BROMIDE 10 MG/ML
INJECTION, SOLUTION INTRAVENOUS AS NEEDED
Status: DISCONTINUED | OUTPATIENT
Start: 2022-11-09 | End: 2022-11-09 | Stop reason: HOSPADM

## 2022-11-09 RX ORDER — DIPHENHYDRAMINE HYDROCHLORIDE 50 MG/ML
12.5 INJECTION, SOLUTION INTRAMUSCULAR; INTRAVENOUS AS NEEDED
Status: DISCONTINUED | OUTPATIENT
Start: 2022-11-09 | End: 2022-11-09 | Stop reason: HOSPADM

## 2022-11-09 RX ORDER — NALOXONE HYDROCHLORIDE 0.4 MG/ML
0.4 INJECTION, SOLUTION INTRAMUSCULAR; INTRAVENOUS; SUBCUTANEOUS AS NEEDED
Status: DISCONTINUED | OUTPATIENT
Start: 2022-11-09 | End: 2022-11-14 | Stop reason: HOSPADM

## 2022-11-09 RX ORDER — ACETAMINOPHEN 325 MG/1
650 TABLET ORAL
Status: DISCONTINUED | OUTPATIENT
Start: 2022-11-09 | End: 2022-11-14 | Stop reason: HOSPADM

## 2022-11-09 RX ORDER — FENTANYL CITRATE 50 UG/ML
INJECTION, SOLUTION INTRAMUSCULAR; INTRAVENOUS AS NEEDED
Status: DISCONTINUED | OUTPATIENT
Start: 2022-11-09 | End: 2022-11-09 | Stop reason: HOSPADM

## 2022-11-09 RX ORDER — LIDOCAINE HYDROCHLORIDE 10 MG/ML
0.1 INJECTION, SOLUTION EPIDURAL; INFILTRATION; INTRACAUDAL; PERINEURAL AS NEEDED
Status: DISCONTINUED | OUTPATIENT
Start: 2022-11-09 | End: 2022-11-09 | Stop reason: HOSPADM

## 2022-11-09 RX ORDER — SODIUM CHLORIDE, SODIUM LACTATE, POTASSIUM CHLORIDE, CALCIUM CHLORIDE 600; 310; 30; 20 MG/100ML; MG/100ML; MG/100ML; MG/100ML
125 INJECTION, SOLUTION INTRAVENOUS CONTINUOUS
Status: DISCONTINUED | OUTPATIENT
Start: 2022-11-09 | End: 2022-11-09 | Stop reason: HOSPADM

## 2022-11-09 RX ORDER — ACETAMINOPHEN 650 MG/1
650 SUPPOSITORY RECTAL
Status: DISCONTINUED | OUTPATIENT
Start: 2022-11-09 | End: 2022-11-14 | Stop reason: HOSPADM

## 2022-11-09 RX ORDER — VANCOMYCIN/0.9 % SOD CHLORIDE 1.5G/250ML
1500 PLASTIC BAG, INJECTION (ML) INTRAVENOUS EVERY 12 HOURS
Status: DISCONTINUED | OUTPATIENT
Start: 2022-11-09 | End: 2022-11-09

## 2022-11-09 RX ORDER — ONDANSETRON 2 MG/ML
4 INJECTION INTRAMUSCULAR; INTRAVENOUS
Status: DISCONTINUED | OUTPATIENT
Start: 2022-11-09 | End: 2022-11-14 | Stop reason: HOSPADM

## 2022-11-09 RX ORDER — POLYETHYLENE GLYCOL 3350 17 G/17G
17 POWDER, FOR SOLUTION ORAL DAILY PRN
Status: DISCONTINUED | OUTPATIENT
Start: 2022-11-09 | End: 2022-11-14 | Stop reason: HOSPADM

## 2022-11-09 RX ORDER — MORPHINE SULFATE 2 MG/ML
2 INJECTION, SOLUTION INTRAMUSCULAR; INTRAVENOUS
Status: DISCONTINUED | OUTPATIENT
Start: 2022-11-09 | End: 2022-11-10

## 2022-11-09 RX ORDER — ONDANSETRON 2 MG/ML
INJECTION INTRAMUSCULAR; INTRAVENOUS AS NEEDED
Status: DISCONTINUED | OUTPATIENT
Start: 2022-11-09 | End: 2022-11-09 | Stop reason: HOSPADM

## 2022-11-09 RX ORDER — DEXMEDETOMIDINE HYDROCHLORIDE 100 UG/ML
INJECTION, SOLUTION INTRAVENOUS AS NEEDED
Status: DISCONTINUED | OUTPATIENT
Start: 2022-11-09 | End: 2022-11-09 | Stop reason: HOSPADM

## 2022-11-09 RX ORDER — SUCCINYLCHOLINE CHLORIDE 20 MG/ML
INJECTION INTRAMUSCULAR; INTRAVENOUS AS NEEDED
Status: DISCONTINUED | OUTPATIENT
Start: 2022-11-09 | End: 2022-11-09 | Stop reason: HOSPADM

## 2022-11-09 RX ORDER — FERROUS SULFATE, DRIED 160(50) MG
1 TABLET, EXTENDED RELEASE ORAL
Status: DISCONTINUED | OUTPATIENT
Start: 2022-11-10 | End: 2022-11-14 | Stop reason: HOSPADM

## 2022-11-09 RX ORDER — FACIAL-BODY WIPES
10 EACH TOPICAL DAILY PRN
Status: DISCONTINUED | OUTPATIENT
Start: 2022-11-09 | End: 2022-11-14 | Stop reason: HOSPADM

## 2022-11-09 RX ORDER — HYDROMORPHONE HYDROCHLORIDE 1 MG/ML
.25-1 INJECTION, SOLUTION INTRAMUSCULAR; INTRAVENOUS; SUBCUTANEOUS
Status: DISCONTINUED | OUTPATIENT
Start: 2022-11-09 | End: 2022-11-09 | Stop reason: HOSPADM

## 2022-11-09 RX ORDER — SODIUM CHLORIDE 0.9 % (FLUSH) 0.9 %
5-40 SYRINGE (ML) INJECTION AS NEEDED
Status: DISCONTINUED | OUTPATIENT
Start: 2022-11-09 | End: 2022-11-14 | Stop reason: HOSPADM

## 2022-11-09 RX ORDER — MORPHINE SULFATE 4 MG/ML
4 INJECTION INTRAVENOUS
Status: DISCONTINUED | OUTPATIENT
Start: 2022-11-09 | End: 2022-11-14 | Stop reason: HOSPADM

## 2022-11-09 RX ORDER — LIDOCAINE HYDROCHLORIDE 20 MG/ML
INJECTION, SOLUTION EPIDURAL; INFILTRATION; INTRACAUDAL; PERINEURAL AS NEEDED
Status: DISCONTINUED | OUTPATIENT
Start: 2022-11-09 | End: 2022-11-09 | Stop reason: HOSPADM

## 2022-11-09 RX ORDER — SODIUM CHLORIDE, SODIUM LACTATE, POTASSIUM CHLORIDE, CALCIUM CHLORIDE 600; 310; 30; 20 MG/100ML; MG/100ML; MG/100ML; MG/100ML
INJECTION, SOLUTION INTRAVENOUS
Status: DISCONTINUED | OUTPATIENT
Start: 2022-11-09 | End: 2022-11-09 | Stop reason: HOSPADM

## 2022-11-09 RX ORDER — ENOXAPARIN SODIUM 100 MG/ML
30 INJECTION SUBCUTANEOUS EVERY 12 HOURS
Status: DISCONTINUED | OUTPATIENT
Start: 2022-11-10 | End: 2022-11-14 | Stop reason: HOSPADM

## 2022-11-09 RX ORDER — DEXAMETHASONE SODIUM PHOSPHATE 4 MG/ML
INJECTION, SOLUTION INTRA-ARTICULAR; INTRALESIONAL; INTRAMUSCULAR; INTRAVENOUS; SOFT TISSUE AS NEEDED
Status: DISCONTINUED | OUTPATIENT
Start: 2022-11-09 | End: 2022-11-09 | Stop reason: HOSPADM

## 2022-11-09 RX ORDER — POLYETHYLENE GLYCOL 3350 17 G/17G
17 POWDER, FOR SOLUTION ORAL DAILY
Status: DISCONTINUED | OUTPATIENT
Start: 2022-11-10 | End: 2022-11-14 | Stop reason: HOSPADM

## 2022-11-09 RX ORDER — ONDANSETRON 2 MG/ML
4 INJECTION INTRAMUSCULAR; INTRAVENOUS ONCE
Status: COMPLETED | OUTPATIENT
Start: 2022-11-09 | End: 2022-11-09

## 2022-11-09 RX ORDER — SODIUM CHLORIDE, SODIUM LACTATE, POTASSIUM CHLORIDE, CALCIUM CHLORIDE 600; 310; 30; 20 MG/100ML; MG/100ML; MG/100ML; MG/100ML
75 INJECTION, SOLUTION INTRAVENOUS CONTINUOUS
Status: DISCONTINUED | OUTPATIENT
Start: 2022-11-09 | End: 2022-11-09 | Stop reason: HOSPADM

## 2022-11-09 RX ORDER — SODIUM CHLORIDE 0.9 % (FLUSH) 0.9 %
5-40 SYRINGE (ML) INJECTION EVERY 8 HOURS
Status: DISCONTINUED | OUTPATIENT
Start: 2022-11-09 | End: 2022-11-14 | Stop reason: HOSPADM

## 2022-11-09 RX ORDER — VANCOMYCIN 2 GRAM/500 ML IN 0.9 % SODIUM CHLORIDE INTRAVENOUS
2 ONCE
Status: DISCONTINUED | OUTPATIENT
Start: 2022-11-09 | End: 2022-11-09 | Stop reason: DRUGHIGH

## 2022-11-09 RX ORDER — FACIAL-BODY WIPES
10 EACH TOPICAL DAILY PRN
Status: DISCONTINUED | OUTPATIENT
Start: 2022-11-11 | End: 2022-11-14 | Stop reason: HOSPADM

## 2022-11-09 RX ORDER — MORPHINE SULFATE 2 MG/ML
2 INJECTION, SOLUTION INTRAMUSCULAR; INTRAVENOUS
Status: DISCONTINUED | OUTPATIENT
Start: 2022-11-09 | End: 2022-11-09

## 2022-11-09 RX ORDER — ASPIRIN 81 MG/1
81 TABLET ORAL DAILY
COMMUNITY

## 2022-11-09 RX ADMIN — MORPHINE SULFATE 2 MG: 2 INJECTION, SOLUTION INTRAMUSCULAR; INTRAVENOUS at 19:58

## 2022-11-09 RX ADMIN — ROCURONIUM BROMIDE 5 MG: 10 INJECTION INTRAVENOUS at 16:03

## 2022-11-09 RX ADMIN — MORPHINE SULFATE 2 MG: 2 INJECTION, SOLUTION INTRAMUSCULAR; INTRAVENOUS at 14:12

## 2022-11-09 RX ADMIN — SODIUM CHLORIDE 125 ML/HR: 9 INJECTION, SOLUTION INTRAVENOUS at 18:33

## 2022-11-09 RX ADMIN — FENTANYL CITRATE 50 MCG: 50 INJECTION, SOLUTION INTRAMUSCULAR; INTRAVENOUS at 16:21

## 2022-11-09 RX ADMIN — HYDROMORPHONE HYDROCHLORIDE 1 MG: 1 INJECTION, SOLUTION INTRAMUSCULAR; INTRAVENOUS; SUBCUTANEOUS at 17:08

## 2022-11-09 RX ADMIN — VANCOMYCIN HYDROCHLORIDE 2500 MG: 10 INJECTION, POWDER, LYOPHILIZED, FOR SOLUTION INTRAVENOUS at 15:43

## 2022-11-09 RX ADMIN — DEXMEDETOMIDINE HCL 4 MCG: 100 INJECTION INTRAVENOUS at 16:19

## 2022-11-09 RX ADMIN — ONDANSETRON HYDROCHLORIDE 4 MG: 2 SOLUTION INTRAMUSCULAR; INTRAVENOUS at 16:10

## 2022-11-09 RX ADMIN — ONDANSETRON 4 MG: 2 INJECTION INTRAMUSCULAR; INTRAVENOUS at 15:02

## 2022-11-09 RX ADMIN — SUCCINYLCHOLINE CHLORIDE 180 MG: 20 INJECTION, SOLUTION INTRAMUSCULAR; INTRAVENOUS at 16:04

## 2022-11-09 RX ADMIN — FENTANYL CITRATE 100 MCG: 50 INJECTION, SOLUTION INTRAMUSCULAR; INTRAVENOUS at 16:02

## 2022-11-09 RX ADMIN — SODIUM CHLORIDE 1000 ML: 9 INJECTION, SOLUTION INTRAVENOUS at 11:35

## 2022-11-09 RX ADMIN — FENTANYL CITRATE 50 MCG: 50 INJECTION, SOLUTION INTRAMUSCULAR; INTRAVENOUS at 16:24

## 2022-11-09 RX ADMIN — MIDAZOLAM HYDROCHLORIDE 1 MG: 1 INJECTION, SOLUTION INTRAMUSCULAR; INTRAVENOUS at 15:50

## 2022-11-09 RX ADMIN — CEFEPIME 2 G: 2 INJECTION, POWDER, FOR SOLUTION INTRAVENOUS at 21:03

## 2022-11-09 RX ADMIN — DEXAMETHASONE SODIUM PHOSPHATE 4 MG: 4 INJECTION, SOLUTION INTRAMUSCULAR; INTRAVENOUS at 16:09

## 2022-11-09 RX ADMIN — CEFEPIME 2 G: 2 INJECTION, POWDER, FOR SOLUTION INTRAVENOUS at 13:03

## 2022-11-09 RX ADMIN — SODIUM CHLORIDE, POTASSIUM CHLORIDE, SODIUM LACTATE AND CALCIUM CHLORIDE: 600; 310; 30; 20 INJECTION, SOLUTION INTRAVENOUS at 15:50

## 2022-11-09 RX ADMIN — SENNOSIDES AND DOCUSATE SODIUM 1 TABLET: 50; 8.6 TABLET ORAL at 18:52

## 2022-11-09 RX ADMIN — PROPOFOL 300 MG: 10 INJECTION, EMULSION INTRAVENOUS at 16:03

## 2022-11-09 RX ADMIN — MORPHINE SULFATE 4 MG: 4 INJECTION INTRAVENOUS at 14:54

## 2022-11-09 RX ADMIN — MIDAZOLAM HYDROCHLORIDE 1 MG: 1 INJECTION, SOLUTION INTRAMUSCULAR; INTRAVENOUS at 15:52

## 2022-11-09 RX ADMIN — MORPHINE SULFATE 4 MG: 4 INJECTION INTRAVENOUS at 22:00

## 2022-11-09 RX ADMIN — SODIUM CHLORIDE, PRESERVATIVE FREE 10 ML: 5 INJECTION INTRAVENOUS at 21:03

## 2022-11-09 RX ADMIN — LIDOCAINE HYDROCHLORIDE 100 MG: 20 INJECTION, SOLUTION EPIDURAL; INFILTRATION; INTRACAUDAL; PERINEURAL at 16:02

## 2022-11-09 RX ADMIN — PROPOFOL 100 MG: 10 INJECTION, EMULSION INTRAVENOUS at 16:21

## 2022-11-09 RX ADMIN — DEXMEDETOMIDINE HCL 4 MCG: 100 INJECTION INTRAVENOUS at 15:51

## 2022-11-09 NOTE — BRIEF OP NOTE
Brief Postoperative Note    Patient: Muna Ibanez  YOB: 1970  MRN: [id number]    Date of Procedure: 11/9/2022     Pre-Op Diagnosis: Septic Arthritis Right Ankle and RIGHT subtalar joints    Post-Op Diagnosis: Same as preoperative diagnosis. Procedure(s):  RIGHT ANKLE ARTHROTOMY, INCISION AND DRAINAGE   RIGHT SUBTALAR ARTHROTOMY, INCISION AND DRAINAGE     Surgeon(s): Ann Puga MD    Surgical Assistant: Surg Asst-1: Nick Lim    Anesthesia: General     Estimated Blood Loss (mL): less than 5cc     Complications: None    Specimens: * No specimens in log *     RIGHT ankle joint culture  RIGHT subtalar joint culture    Implants: * No implants in log *    Drains: * No LDAs found *  Penrose drain in the ankle and subtalar incisions (neither drain is sewn in place)    Findings: purulent fluid in the ankle and subtalar joints    TT: thigh 300mmHg x 17 min    Electronically Signed by Kandy Krause MD on 11/9/2022 at 4:57 PM

## 2022-11-09 NOTE — ED PROVIDER NOTES
Mr. Taylor Herrera is a 56yo male who presents to the ER with complaints of infection to his right ankle. He said that he recently hurt his ankle about 5 weeks ago. He was feeling better until 2 and half weeks ago. He had worsening pain. He been seen in the ER and by Ortho. Eventually, he had an MRI and his joint was aspirated. He was told that he had infection and was sent to the ER. He denies fevers or chills. No nausea or vomiting. No chest pain or trouble breathing. He reports he has continued to have pain and swelling of his right ankle. He denies other complaints.        Past Medical History:   Diagnosis Date    Adverse effect of anesthesia     TAKES A LONG TIME TO WAKE UP    Anxiety     Arthritis     Complex tear of medial meniscus of left knee 3/28/2019    Depression     GERD (gastroesophageal reflux disease)     HTN, goal below 140/90 10/09/2011    Hyperlipidemia 10/9/2011    Lipoma 10/9/2011    Sleep apnea     CPAP        Past Surgical History:   Procedure Laterality Date    HX ORTHOPAEDIC Left 03/28/2019    ACL replaced         Family History:   Problem Relation Age of Onset    Cancer Mother         BREAST    Diabetes Mother         type 2    Hypertension Mother     Hypertension Father     Heart Attack Father         64    No Known Problems Sister     Deep Vein Thrombosis Brother     Deep Vein Thrombosis Other     Anesth Problems Neg Hx        Social History     Socioeconomic History    Marital status:      Spouse name: Not on file    Number of children: Not on file    Years of education: Not on file    Highest education level: Not on file   Occupational History    Not on file   Tobacco Use    Smoking status: Never    Smokeless tobacco: Never   Substance and Sexual Activity    Alcohol use: Yes     Comment: 1-2 drinks a month    Drug use: Never    Sexual activity: Yes     Partners: Female   Other Topics Concern    Not on file   Social History Narrative    Not on file     Social Determinants of Health     Financial Resource Strain: Not on file   Food Insecurity: Not on file   Transportation Needs: Not on file   Physical Activity: Not on file   Stress: Not on file   Social Connections: Not on file   Intimate Partner Violence: Not on file   Housing Stability: Not on file         ALLERGIES: Other food    Review of Systems   Constitutional:  Negative for chills and fever. HENT:  Negative for rhinorrhea and sore throat. Respiratory:  Negative for cough and shortness of breath. Cardiovascular:  Negative for chest pain. Gastrointestinal:  Negative for abdominal pain, diarrhea, nausea and vomiting. Genitourinary:  Negative for dysuria and hematuria. Musculoskeletal:         Ankle pain, ankle swelling   Skin:  Negative for pallor and rash. Neurological:  Negative for dizziness, weakness and light-headedness. All other systems reviewed and are negative. Vitals:    11/09/22 1054   BP: (!) 144/81   Pulse: (!) 102   Resp: 18   Temp: 97.9 °F (36.6 °C)   SpO2: 98%   Weight: 122.5 kg (270 lb)   Height: 5' 11\" (1.803 m)            Physical Exam     Vital signs reviewed. Nursing notes reviewed.     Const:  No acute distress, well developed, well nourished  Head:  Atraumatic, normocephalic  Eyes:  PERRL, conjunctiva normal, no scleral icterus  Neck:  Supple, trachea midline  Cardiovascular: Regular rate  Resp:  No resp distress, no increased work of breathing  Abd:  Soft, non-tender, non-distended  MSK: Ankle boot and dressing in place over the right ankle  Neuro:  Alert and oriented x3, no cranial nerve defect  Skin:  Warm, dry, intact  Psych: normal mood and affect, behavior is normal, judgement and thought content is normal        MDM     Amount and/or Complexity of Data Reviewed  Clinical lab tests: ordered and reviewed  Tests in the radiology section of CPT®: reviewed  Review and summarize past medical records: yes    Patient Progress  Patient progress: stable         Procedures      Perfect Serve Consult for Admission  1:51 PM    ED Room Number: D29/D29  Patient Name and age:  Kenny Vickers Both 50 y.o.  male  Working Diagnosis:   1. Septic arthritis of right ankle, due to unspecified organism ([address]        COVID-19 Suspicion:  no  Sepsis present:  no  Reassessment needed: no  Readmission: no  Isolation Requirements:  no  Recommended Level of Care:  med/surg  Department:[facility] ED - [phone]  Other:  will be going to the OR today with ortho. Mr. Bryan Benjamin is a 56yo male who presents to the ER with complaints of ankle pain and septic arthritis. Pt. Was sent in by ortho for admission and go to go the OR. The joint was aspirated in clinic and was sent off for cultures. I have ordered abx. Pt.  To be evaluated for admission by the hospitalist.

## 2022-11-09 NOTE — ANESTHESIA POSTPROCEDURE EVALUATION
Procedure(s):  RIGHT ANKLE ARTHROTOMY, INCISION AND DRAINAGE RIGHT ANKLE. general    Anesthesia Post Evaluation        Patient location during evaluation: PACU  Level of consciousness: awake  Pain management: adequate  Airway patency: patent  Anesthetic complications: no  Cardiovascular status: acceptable  Respiratory status: acceptable  Hydration status: acceptable  Post anesthesia nausea and vomiting:  none      INITIAL Post-op Vital signs:   Vitals Value Taken Time   /84 11/09/22 1740   Temp 36.8 °C (98.2 °F) 11/09/22 1654   Pulse 96 11/09/22 1746   Resp 12 11/09/22 1746   SpO2 98 % 11/09/22 1746   Vitals shown include unvalidated device data.

## 2022-11-09 NOTE — PROGRESS NOTES
Pt. Fall protocol  Yellow armband on patient, yellow non skid socks on  Bed in low position, all side rails up, call bell in reach  Pt. And family instructed in \"call don't fall\" protocol  Use your call bell and wait for assistance, staff not family will assist you to get up and move about  Pt. And family verbalize understanding of fall precautions and \"call don't fall\" protocol   1412,Morphine 2mg IVP for pain,1454,Morphine 4mg IVP for pain RLE.  1502,Zofran 4mg IVP for nausea.

## 2022-11-09 NOTE — PROGRESS NOTES
[facility]: TRANSFER MED RECONCILIATION    Comments/Recommendations:     Appreciate med rec completed by nursing  PTA med list is consistent with Rx query    Information obtained from: nursing med rec, Rx query    Significant PMH/Disease States:   Past Medical History:   Diagnosis Date    Adverse effect of anesthesia     TAKES A LONG TIME TO WAKE UP    Anxiety     Arthritis     Complex tear of medial meniscus of left knee 3/28/2019    Depression     GERD (gastroesophageal reflux disease)     HTN, goal below 140/90 10/09/2011    Hyperlipidemia 10/9/2011    Lipoma 10/9/2011    Sleep apnea     CPAP      Chief Complaint for this Admission:   Chief Complaint   Patient presents with    Ankle Pain     right     Allergies: Peanut    Prior to Admission Medications:     Medication Documentation Review Audit       Reviewed by Taqueria Lee, PHARMD (Pharmacist) on 11/09/22 at 1429      Medication Sig Documenting Provider Last Dose Status Taking?   aspirin delayed-release 81 mg tablet Take 81 mg by mouth daily. Provider, Historical 11/8/2022 Active Yes   atorvastatin (LIPITOR) 20 mg tablet Take 20 mg by mouth daily. Provider, Historical 11/8/2022 Active Yes   escitalopram oxalate (LEXAPRO) 20 mg tablet TAKE 1 TABLET BY MOUTH DAILY Kurtis Leon MD 11/8/2022 Active Yes   irbesartan (AVAPRO) 300 mg tablet TAKE 1 TABLET BY MOUTH DAILY Kurtis Leon MD 11/8/2022 Active Yes   loratadine (CLARITIN) 10 mg tablet Take 10 mg by mouth daily. Provider, Historical  Active            Med Note Debra Yuen   Fri Jun 7, 2019  3:31 PM)                    Thank you,  Dariusz Corey

## 2022-11-09 NOTE — PROGRESS NOTES
Ortho Note    Subjective:    Abhinav Thompson is a 50 y.o. male seen in the office by this am with RIGHT ankle joint aspiration highly suspicious for septic arthritis. Pt has ankle sprain ~ 1 month ago but has had severe swelling, pain with ROM and difficulty bearing weight getting worse sense his injury. DVT ultrasound negative yesterday. I sent him over to the hospital directly from clinic today for admission to the Hospitalist service, IV abx, ID consult and surgery to drain his ankle    Major Events:. Pain controlled. Objective:    Vital signs in last 24 hours:    Temp:  [97.9 °F (36.6 °C)-98.2 °F (36.8 °C)]   Pulse (Heart Rate):  []   BP: (138-158)/(73-82)   Resp Rate:  [12-20]   O2 Sat (%):  [98 %-100 %]   Weight:  [122.5 kg (270 lb)]     Temp (24hrs), Av.1 °F (36.7 °C), Min:97.9 °F (36.6 °C), Max:98.2 °F (36.8 °C)      Labs:    Lab Results   Component Value Date/Time    WBC 8.6 2022 11:08 AM    HGB 12.1 2022 11:08 AM    HCT 37.8 2022 11:08 AM    PLATELET 495 (H)  11:08 AM       Lab Results   Component Value Date/Time    Sodium 134 (L) 2022 11:08 AM    Potassium 4.1 2022 11:08 AM    Chloride 99 2022 11:08 AM    CO2 29 2022 11:08 AM    BUN 22 (H) 2022 11:08 AM       Physical Exam:    General: alert, cooperative, in NAD  Nonlabored resp    RIGHT Lower extremity    Ankle warm/swollen, cmpts soft  Pain with rom ankle and subtalar joints  Ttp at ankle and sinus tarsi    Motor: + ankle df/pf    Sensory: SILT    Vascular: Brisk capillary refill in toes    Assessment/Plan:    51 yo M with RIGHT ankle septic arthritis  NPO  Bedrest  Plan OR for RIGHT ankle and subtalar joint arthrotomy and washout    Denise Pemberton.  Lilliana Javier MD

## 2022-11-09 NOTE — PROGRESS NOTES
[address] [id number] RX Pharmacy Progress Note: Antimicrobial Stewardship    Consult for antibiotic dosing of vancomycin by Dr. Noemi Aguilar  Indication: bone and joint infection  Day of Therapy: 1    Plan:  Vancomycin therapy:  Start with loading dose of vancomycin 2500 mg IV (25 mg/kg, max 2.5 gm) x 1 now (not yet given)  Follow with maintenance dose of vancomycin 1000 mg IV every 12 hours    Dose calculated to approximate a   Target AUC/ROBERT of 400-600  Trough n/a   Plan:  Regimen above predicts  per Bayesian kinetics calculations. SCr ordered every other day per protocol. Ortho on board. Pharmacy to follow daily and will make changes to dose and/or frequency based on clinical status. Other Antimicrobial  (not dosed by pharmacist)   Cefepime 2 gm IV Q8H   Cultures     pending   Serum Creatinine     Lab Results   Component Value Date/Time    Creatinine 1.32 (H) 11/09/2022 11:08 AM       Creatinine Clearance Estimated Creatinine Clearance: 89.2 mL/min (A) (based on SCr of 1.32 mg/dL (H)). Procalcitonin  No results found for: PCT     Temp   97.9 °F (36.6 °C)    WBC   Lab Results   Component Value Date/Time    WBC 8.6 11/09/2022 11:08 AM       For Antifungals, Metronidazole and Nafcillin: Lab Results   Component Value Date/Time    ALT (SGPT) 49 11/09/2022 11:08 AM    AST (SGOT) 24 11/09/2022 11:08 AM    Alk.  phosphatase 93 11/09/2022 11:08 AM    Bilirubin, total 0.6 11/09/2022 11:08 AM         Pharmacist: Bri Dunlap

## 2022-11-09 NOTE — H&P
[facility]  [address]  [phone]         Hospitalist Admission Note        NAME:  Carisa Ramírez   :  1970   MRN:  [id number]    Date/Time:  2022     Patient PCP:  Luz Zimmer MD    Emergency Contact:    Extended Emergency Contact Information  Primary Emergency Contact: Dano Guerra Phone: [phone]  Relation: Spouse   needed? No      Code: Full Code     Isolation Precautions: There are currently no Active Isolations        Subjective:     Chief Complaint: Right ankle pain and infection    History of Present Illness:     Mr. Margot Garcia is a 50 y.o. male with history that includes HTN, HLD, sleep apnea using CPAP, and GERD was sent from Dr. Shaquille Skelton office with concerns of septic right ankle. Patient reports that for the past 2 to 3 weeks he has been having moderate to severe constant pain of the right ankle which has progressively gotten worse associated with swelling but no fevers or chills. Was using a walking boot. Aspiration at Dr. Shaquille Skelton office was sent in and the patient was recommended to come to the ER for admission with plans for orthopedic surgery. In ER he patient was given morphine and started on IV antibiotics. Allergies  Allergies   Allergen Reactions    Peanut Nausea Only     PEANUTS: NAUSEA AND UPSET STOMACH       Medications  Prior to Admission medications    Medication Sig Start Date End Date Taking? Authorizing Provider   aspirin delayed-release 81 mg tablet Take 81 mg by mouth daily. Yes Provider, Historical   escitalopram oxalate (LEXAPRO) 20 mg tablet TAKE 1 TABLET BY MOUTH DAILY 20  Yes Alonzo Lerma MD   irbesartan (AVAPRO) 300 mg tablet TAKE 1 TABLET BY MOUTH DAILY 8/3/20  Yes Alonzo Lerma MD   atorvastatin (LIPITOR) 20 mg tablet Take 20 mg by mouth daily. Yes Provider, Historical   loratadine (CLARITIN) 10 mg tablet Take 10 mg by mouth daily.     Provider, Historical        Past Medical History  Past Medical History:   Diagnosis Date    Adverse effect of anesthesia     TAKES A LONG TIME TO WAKE UP    Anxiety     Arthritis     Complex tear of medial meniscus of left knee 3/28/2019    Depression     GERD (gastroesophageal reflux disease)     HTN, goal below 140/90 10/09/2011    Hyperlipidemia 10/9/2011    Lipoma 10/9/2011    Sleep apnea     CPAP         Past Surgical History  Past Surgical History:   Procedure Laterality Date    HX ORTHOPAEDIC Left 03/28/2019    ACL replaced       Social History  Social History     Tobacco Use    Smoking status: Never    Smokeless tobacco: Never   Substance Use Topics    Alcohol use: Yes     Comment: 1-2 drinks a month        Family History  Family History   Problem Relation Age of Onset    Cancer Mother         BREAST    Diabetes Mother         type 2    Hypertension Mother     Hypertension Father     Heart Attack Father         64    No Known Problems Sister     Deep Vein Thrombosis Brother     Deep Vein Thrombosis Other     Anesth Problems Neg Hx           Review of Systems (14 point ROS):  Gen:   denies fever, chills, fatigue, malaise, generalized weakness  Resp:  denies cough, shortness of breath, sputum, pleuritis, hemoptysis, wheezing  CVS:   negative and denies palpitations, CP, dizziness, syncope, edema, PND, NATHAN, orthopnea  GI:       negative and denies abd pain, nausea, vomit, diarrhea, constipation, GERD, melena, hematochezia  MS:      right ankle swelling and pain    Remainder of 14 point ROS was reviewed and was negative         Objective:      Visit Vitals  BP (!) 144/81   Pulse (!) 105   Temp 98.2 °F (36.8 °C)   Resp 20   Ht 5' 11\" (1.803 m)   Wt 122.5 kg (270 lb)   SpO2 100%   BMI 37.66 kg/m²       Physical Exam:  General: alert, well appearing, and no distress  Head: Normocephalic, without obvious abnormality, atraumatic  Eyes: PERRL, EOMI, anicteric sclerae, and conjuntiva clear  ENT: lips, mucosa, and tongue normal  Neck: normal, supple, and no tenderness  Lungs: clear to auscultation with good breath sounds and normal respiratory effort  Heart: S1, S2 normal, regular rate, and regular rhythm  Abd: not distended, soft, nontender, BS present and normactive  Ext: no cyanosis, no edema, and there was however limitation of the examination of the right lower extremity ankle and foot due to the boot which I was not going to remove the time of my exam  Skin: normal skin color, no rashes, and texture normal  Neuro:  alert, oriented x 3, no defects noted in general exam.  Psych: not anxious, cooperative, appropriate affect      Labs:  Recent Labs     11/09/22  1108   WBC 8.6   HGB 12.1   HCT 37.8   *     Recent Labs     11/09/22  1108   *   K 4.1   CL 99   CO2 29   GLU 99   BUN 22*   CREA 1.32*   CA 10.1   ALB 3.3*   TBILI 0.6   ALT 49       Radiology:  MRI ANKLE RT WO CONT    Result Date: 11/8/2022  1. Prominent osseous edema demonstrated within the talus, calcaneus, distal tibia and distal fibula without demonstration of fracture line. There is furthermore a large effusion of the ankle and posterior subtalar joints. Although there is a reported history of trauma, the prominence of these findings and appearance of the prosthesis to be centered at the above-noted articulations, consideration should be given to the possibility of inflammatory/infectious arthritis with associated secondary bony inflammatory changes. Clinical correlation is recommended. Consider further evaluation with joint aspiration. 2. Thickened appearance of anterior talofibular ligament which is otherwise intact. Other ankle ligaments appear intact. 3. Extensive muscle edema within field of view. 4. Grade 1 tendinopathy of the inframalleolar peroneus longus. Small nonspecific effusions of posterior tibialis and peroneal tendon sheath. 5. Prominent nonspecific diffuse subcutaneous edema. Nonspecific edema within sinus tarsi.    I personally reviewed and interpreted the imaging studies and agree with official reading      The labs, imaging studies, and medications was reviewed by me on: November 9, 2022         Assessment/Plan:      Septic arthritis of right ankle ([address] (11/9/2022): Admit for workup and required IV antibiotic therapy with Cefepime and Vanco.  Pharmacy consult for dosing for vancomycin. Local wound care as needed. Address pain control according to intensity Tylenol: mild pain, Roxicodone: mod pain, and Morphine IV: severe pain. Labs:  CMP, CBC,Cultures pending.    Consult(s): Ortho and ID    ALANNA on CPAP: Continue CPAP    HTN / HLD: Stable continue home meds    Body mass index is 37.66 kg/m².:  30.0 - 39.9:  Obese    F:    E:  Stable - monitor  N:  DIET NPO     Risk of deterioration: high      Discussed:  Pt's condition, Imaging findings, Lab findings, Assessment, Care Plan, and Code Status discussed with: Patient, ED Provider, and RN    Prophylaxis:   Ortho to determine post-0op    Probable disposition:  Pamella Phan        Date of service:    11/9/2022                ___________________________________________________    Admitting Physician: Elaine Barnes MD         CC: Tatianna Glez MD

## 2022-11-09 NOTE — ROUTINE PROCESS
Specific MD instructions to completely FLOAT the heel of the right ankle. Keep the ortho boot on at all times, especially while in bed and keep the leg elevated on BLANKETS, not pillows, to provide a better, constant elevation. TRANSFER - OUT REPORT:    Verbal report given to Sulma Henderson RN  (name) on Raven Min Both  being transferred to [facility]  (unit) for routine post - op       Report consisted of patients Situation, Background, Assessment and   Recommendations(SBAR). Information from the following report(s) SBAR, Kardex, OR Summary, Procedure Summary, Intake/Output, MAR, and Recent Results was reviewed with the receiving nurse. Lines:   Peripheral IV Left Antecubital (Active)        Opportunity for questions and clarification was provided.       Patient transported with:   O2 @ 2 liters  Registered Nurse    Visit Vitals  BP (!) 150/89 (BP 1 Location: Right upper arm, BP Patient Position: At rest;Lying)   Pulse 89   Temp 97.3 °F (36.3 °C)   Resp 14   Ht 5' 11\" (1.803 m)   Wt 122.5 kg (270 lb)   SpO2 97%   BMI 37.66 kg/m²

## 2022-11-09 NOTE — CONSULTS
ORTHOPEDIC  CONSULT    Subjective:     Date of Consultation:  November 9, 2022    Referring Physician:  Dr. Zoey Elmore is a 50 y.o.  male who is being seen for possible septic right ankle. He was seen by Dr. Kane Flaherty in office earlier today and ankle aspirated. Sent to ED for admission and starting IV abx. He reports 18 days of ankle pain and has been compliant in walking boot. Had tried NSAIDs and variable results. Pain is rated 10/10 today. No recent trauma to the right ankle, no fever/chills, no SOB/CP. Patient Active Problem List    Diagnosis Date Noted    Inguinodynia, left 04/14/2021    Anxiety and depression     GERD (gastroesophageal reflux disease)     Sleep apnea     Adverse effect of anesthesia     Complex tear of medial meniscus of left knee 03/28/2019    Hyperlipidemia 10/09/2011    Lipoma 10/09/2011    HTN, goal below 140/90 10/09/2011     Family History   Problem Relation Age of Onset    Cancer Mother         BREAST    Diabetes Mother         type 2    Hypertension Mother     Hypertension Father     Heart Attack Father         64    No Known Problems Sister     Deep Vein Thrombosis Brother     Deep Vein Thrombosis Other     Anesth Problems Neg Hx       Social History     Tobacco Use    Smoking status: Never    Smokeless tobacco: Never   Substance Use Topics    Alcohol use: Yes     Comment: 1-2 drinks a month     Past Medical History:   Diagnosis Date    Adverse effect of anesthesia     TAKES A LONG TIME TO WAKE UP    Anxiety     Arthritis     Complex tear of medial meniscus of left knee 3/28/2019    Depression     GERD (gastroesophageal reflux disease)     HTN, goal below 140/90 10/09/2011    Hyperlipidemia 10/9/2011    Lipoma 10/9/2011    Sleep apnea     CPAP       Past Surgical History:   Procedure Laterality Date    HX ORTHOPAEDIC Left 03/28/2019    ACL replaced      Prior to Admission medications    Medication Sig Start Date End Date Taking?  Authorizing Provider escitalopram oxalate (LEXAPRO) 20 mg tablet TAKE 1 TABLET BY MOUTH DAILY 20   Perfecto Lundy MD   irbesartan (AVAPRO) 300 mg tablet TAKE 1 TABLET BY MOUTH DAILY 8/3/20   Perfecto Lundy MD   trimethoprim-polymyxin b (POLYTRIM) ophthalmic solution 2 drops four times daily to affected eye for 7 days 20   Perfecto Lundy MD   aspirin delayed-release 81 mg tablet Take 4 Tabs by mouth two (2) times a day. Patient taking differently: Take 81 mg by mouth daily. 3/28/19   Herminia Mtz MD   atorvastatin (LIPITOR) 20 mg tablet Take 20 mg by mouth daily. Provider, Historical   loratadine (CLARITIN) 10 mg tablet Take 10 mg by mouth daily. Provider, Historical     Current Facility-Administered Medications   Medication Dose Route Frequency    cefepime (MAXIPIME) 2 g in 0.9% sodium chloride 10 mL IV syringe  2 g IntraVENous Q8H    vancomycin (VANCOCIN) 2500 mg in  mL infusion  2,500 mg IntraVENous NOW     Current Outpatient Medications   Medication Sig    escitalopram oxalate (LEXAPRO) 20 mg tablet TAKE 1 TABLET BY MOUTH DAILY    irbesartan (AVAPRO) 300 mg tablet TAKE 1 TABLET BY MOUTH DAILY    trimethoprim-polymyxin b (POLYTRIM) ophthalmic solution 2 drops four times daily to affected eye for 7 days    aspirin delayed-release 81 mg tablet Take 4 Tabs by mouth two (2) times a day. (Patient taking differently: Take 81 mg by mouth daily.)    atorvastatin (LIPITOR) 20 mg tablet Take 20 mg by mouth daily. loratadine (CLARITIN) 10 mg tablet Take 10 mg by mouth daily. Allergies   Allergen Reactions    Peanut Nausea Only     PEANUTS: NAUSEA AND UPSET STOMACH        Review of Systems:  A comprehensive review of systems was negative except for that written in the HPI.     Objective:     Patient Vitals for the past 8 hrs:   BP Temp Pulse Resp SpO2 Height Weight   22 1054 (!) 144/81 97.9 °F (36.6 °C) (!) 102 18 98 % 5' 11\" (1.803 m) 122.5 kg (270 lb)     Temp (24hrs), Av.9 °F (36.6 °C), Min:97.9 °F (36.6 °C), Max:97.9 °F (36.6 °C)        EXAM: General: alert, cooperative, no distress, appears stated age  Cardiovascular negative for chest pain, palpitations  Lungs: negative for cough, asthma, or wheezing  Musculoskeletal:   Right ankle: FAWB in place and sock. Removed and skin inspected, erythema to both medial and lateral joint space and significant tenderness to palpation. No skin blisters or rashes, SILT, +DP/PT pulses. Skin:see above    Data Review   Recent Results (from the past 24 hour(s))   SAMPLES BEING HELD    Collection Time: 11/09/22 11:08 AM   Result Value Ref Range    SAMPLES BEING HELD [email or web address]     COMMENT        Add-on orders for these samples will be processed based on acceptable specimen integrity and analyte stability, which may vary by analyte. CBC WITH AUTOMATED DIFF    Collection Time: 11/09/22 11:08 AM   Result Value Ref Range    WBC 8.6 4.1 - 11.1 K/uL    RBC 4.38 4.10 - 5.70 M/uL    HGB 12.1 12.1 - 17.0 g/dL    HCT 37.8 36.6 - 50.3 %    MCV 86.3 80.0 - 99.0 FL    MCH 27.6 26.0 - 34.0 PG    MCHC 32.0 30.0 - 36.5 g/dL    RDW 13.5 11.5 - 14.5 %    PLATELET 893 (H) 201 - 400 K/uL    MPV 8.3 (L) 8.9 - 12.9 FL    NRBC 0.0 0  WBC    ABSOLUTE NRBC 0.00 0.00 - 0.01 K/uL    NEUTROPHILS 76 (H) 32 - 75 %    LYMPHOCYTES 15 12 - 49 %    MONOCYTES 6 5 - 13 %    EOSINOPHILS 2 0 - 7 %    BASOPHILS 0 0 - 1 %    IMMATURE GRANULOCYTES 1 (H) 0.0 - 0.5 %    ABS. NEUTROPHILS 6.6 1.8 - 8.0 K/UL    ABS. LYMPHOCYTES 1.3 0.8 - 3.5 K/UL    ABS. MONOCYTES 0.5 0.0 - 1.0 K/UL    ABS. EOSINOPHILS 0.1 0.0 - 0.4 K/UL    ABS. BASOPHILS 0.0 0.0 - 0.1 K/UL    ABS. IMM.  GRANS. 0.0 0.00 - 0.04 K/UL    DF AUTOMATED     POC LACTIC ACID    Collection Time: 11/09/22 12:27 PM   Result Value Ref Range    Lactic Acid (POC) 0.82 0.40 - 2.00 mmol/L     Sed rate 90, CRP 16.20    Assessment/Plan:     A:  Possible septic right ankle joint vs other inflammatory arthropathy    P:   Admit to medicine IV abx. Started - broad spectrum with narrowing once aspirate from clinic culture available, appreciate ID recs  CBC, CRP, Sed rate  NWB RLE, rest, ice, elevate  Pain control - IV Morphine ordered  NPO for possible OR today - last meal yesterday     I have discussed the above findings and plan of care with Dr. Onofre Barnes and he agrees.      Ferny Godoy, PA-C  [facility]  [address] [facility]

## 2022-11-09 NOTE — ED TRIAGE NOTES
Pt arrives to ED with complaints of right ankle pain. Pt reports spraining his ankle on Oct 6th. Pt reports ankle was improving. Drove to [place] around the Oct 16th and by the time he got there his left ankle was swollen and he heard a \"pop\". Pt seen in the ED there, had [address], MRI and followed up with Ortho today. Pt has ankle aspirated to find puss. Pt sent here by Ortho to have ankle \"cleaned\" and get IV antibiotics for infection.

## 2022-11-09 NOTE — ANESTHESIA PREPROCEDURE EVALUATION
Relevant Problems   RESPIRATORY SYSTEM   (+) Sleep apnea      NEUROLOGY   (+) Anxiety and depression      CARDIOVASCULAR   (+) HTN, goal below 140/90      GASTROINTESTINAL   (+) GERD (gastroesophageal reflux disease)       Anesthetic History               Review of Systems / Medical History  Patient summary reviewed, nursing notes reviewed and pertinent labs reviewed    Pulmonary        Sleep apnea: CPAP           Neuro/Psych         Psychiatric history     Cardiovascular    Hypertension                   GI/Hepatic/Renal     GERD           Endo/Other        Obesity     Other Findings            Physical Exam    Airway  Mallampati: III  TM Distance: 4 - 6 cm  Neck ROM: normal range of motion   Mouth opening: Normal     Cardiovascular    Rhythm: regular  Rate: normal         Dental    Dentition: Lower dentition intact and Upper dentition intact     Pulmonary  Breath sounds clear to auscultation               Abdominal  GI exam deferred       Other Findings            Anesthetic Plan    ASA: 3  Anesthesia type: general          Induction: Intravenous  Anesthetic plan and risks discussed with: Patient

## 2022-11-10 LAB
ALBUMIN SERPL-MCNC: 2.5 G/DL (ref 3.5–5)
ALBUMIN/GLOB SERPL: 0.6 {RATIO} (ref 1.1–2.2)
ALP SERPL-CCNC: 75 U/L (ref 45–117)
ALT SERPL-CCNC: 34 U/L (ref 12–78)
ANION GAP SERPL CALC-SCNC: 5 MMOL/L (ref 5–15)
AST SERPL-CCNC: 17 U/L (ref 15–37)
BASOPHILS # BLD: 0 K/UL (ref 0–0.1)
BASOPHILS NFR BLD: 0 % (ref 0–1)
BILIRUB SERPL-MCNC: 0.4 MG/DL (ref 0.2–1)
BUN SERPL-MCNC: 15 MG/DL (ref 6–20)
BUN/CREAT SERPL: 15 (ref 12–20)
CALCIUM SERPL-MCNC: 8.8 MG/DL (ref 8.5–10.1)
CHLORIDE SERPL-SCNC: 103 MMOL/L (ref 97–108)
CO2 SERPL-SCNC: 28 MMOL/L (ref 21–32)
CREAT SERPL-MCNC: 1.03 MG/DL (ref 0.7–1.3)
DIFFERENTIAL METHOD BLD: ABNORMAL
EOSINOPHIL # BLD: 0 K/UL (ref 0–0.4)
EOSINOPHIL NFR BLD: 0 % (ref 0–7)
ERYTHROCYTE [DISTWIDTH] IN BLOOD BY AUTOMATED COUNT: 13.4 % (ref 11.5–14.5)
GLOBULIN SER CALC-MCNC: 4.4 G/DL (ref 2–4)
GLUCOSE SERPL-MCNC: 163 MG/DL (ref 65–100)
HCT VFR BLD AUTO: 33.1 % (ref 36.6–50.3)
HGB BLD-MCNC: 10.7 G/DL (ref 12.1–17)
IMM GRANULOCYTES # BLD AUTO: 0 K/UL (ref 0–0.04)
IMM GRANULOCYTES NFR BLD AUTO: 0 % (ref 0–0.5)
LACTATE SERPL-SCNC: 0.9 MMOL/L (ref 0.4–2)
LYMPHOCYTES # BLD: 0.8 K/UL (ref 0.8–3.5)
LYMPHOCYTES NFR BLD: 8 % (ref 12–49)
MCH RBC QN AUTO: 27.7 PG (ref 26–34)
MCHC RBC AUTO-ENTMCNC: 32.3 G/DL (ref 30–36.5)
MCV RBC AUTO: 85.8 FL (ref 80–99)
MONOCYTES # BLD: 0.4 K/UL (ref 0–1)
MONOCYTES NFR BLD: 4 % (ref 5–13)
NEUTS SEG # BLD: 9.5 K/UL (ref 1.8–8)
NEUTS SEG NFR BLD: 88 % (ref 32–75)
NRBC # BLD: 0 K/UL (ref 0–0.01)
NRBC BLD-RTO: 0 PER 100 WBC
PLATELET # BLD AUTO: 442 K/UL (ref 150–400)
PMV BLD AUTO: 8.1 FL (ref 8.9–12.9)
POTASSIUM SERPL-SCNC: 4.2 MMOL/L (ref 3.5–5.1)
PROT SERPL-MCNC: 6.9 G/DL (ref 6.4–8.2)
RBC # BLD AUTO: 3.86 M/UL (ref 4.1–5.7)
SODIUM SERPL-SCNC: 136 MMOL/L (ref 136–145)
WBC # BLD AUTO: 10.9 K/UL (ref 4.1–11.1)

## 2022-11-10 PROCEDURE — 74011000258 HC RX REV CODE- 258: Performed by: INTERNAL MEDICINE

## 2022-11-10 PROCEDURE — 74011250636 HC RX REV CODE- 250/636: Performed by: HOSPITALIST

## 2022-11-10 PROCEDURE — 74011000250 HC RX REV CODE- 250: Performed by: ORTHOPAEDIC SURGERY

## 2022-11-10 PROCEDURE — 97530 THERAPEUTIC ACTIVITIES: CPT

## 2022-11-10 PROCEDURE — 74011250637 HC RX REV CODE- 250/637: Performed by: ORTHOPAEDIC SURGERY

## 2022-11-10 PROCEDURE — 85025 COMPLETE CBC W/AUTO DIFF WBC: CPT

## 2022-11-10 PROCEDURE — 74011250636 HC RX REV CODE- 250/636: Performed by: ORTHOPAEDIC SURGERY

## 2022-11-10 PROCEDURE — 97161 PT EVAL LOW COMPLEX 20 MIN: CPT

## 2022-11-10 PROCEDURE — 74011000258 HC RX REV CODE- 258: Performed by: EMERGENCY MEDICINE

## 2022-11-10 PROCEDURE — 97535 SELF CARE MNGMENT TRAINING: CPT

## 2022-11-10 PROCEDURE — 94761 N-INVAS EAR/PLS OXIMETRY MLT: CPT

## 2022-11-10 PROCEDURE — 97116 GAIT TRAINING THERAPY: CPT

## 2022-11-10 PROCEDURE — 97165 OT EVAL LOW COMPLEX 30 MIN: CPT

## 2022-11-10 PROCEDURE — 80053 COMPREHEN METABOLIC PANEL: CPT

## 2022-11-10 PROCEDURE — 65270000029 HC RM PRIVATE

## 2022-11-10 PROCEDURE — 99222 1ST HOSP IP/OBS MODERATE 55: CPT | Performed by: INTERNAL MEDICINE

## 2022-11-10 PROCEDURE — 74011250637 HC RX REV CODE- 250/637: Performed by: INTERNAL MEDICINE

## 2022-11-10 PROCEDURE — 36415 COLL VENOUS BLD VENIPUNCTURE: CPT

## 2022-11-10 PROCEDURE — 74011250636 HC RX REV CODE- 250/636: Performed by: EMERGENCY MEDICINE

## 2022-11-10 PROCEDURE — 74011250636 HC RX REV CODE- 250/636: Performed by: INTERNAL MEDICINE

## 2022-11-10 PROCEDURE — 83605 ASSAY OF LACTIC ACID: CPT

## 2022-11-10 PROCEDURE — 74011250637 HC RX REV CODE- 250/637: Performed by: HOSPITALIST

## 2022-11-10 RX ORDER — ASPIRIN 81 MG/1
81 TABLET ORAL DAILY
Status: DISCONTINUED | OUTPATIENT
Start: 2022-11-10 | End: 2022-11-14 | Stop reason: HOSPADM

## 2022-11-10 RX ORDER — ATORVASTATIN CALCIUM 20 MG/1
20 TABLET, FILM COATED ORAL DAILY
Status: DISCONTINUED | OUTPATIENT
Start: 2022-11-10 | End: 2022-11-14 | Stop reason: HOSPADM

## 2022-11-10 RX ORDER — LORAZEPAM 1 MG/1
1 TABLET ORAL
Status: DISCONTINUED | OUTPATIENT
Start: 2022-11-10 | End: 2022-11-14 | Stop reason: HOSPADM

## 2022-11-10 RX ORDER — IRBESARTAN 300 MG/1
300 TABLET ORAL DAILY
Status: DISCONTINUED | OUTPATIENT
Start: 2022-11-10 | End: 2022-11-14 | Stop reason: HOSPADM

## 2022-11-10 RX ORDER — ESCITALOPRAM OXALATE 10 MG/1
20 TABLET ORAL DAILY
Status: DISCONTINUED | OUTPATIENT
Start: 2022-11-10 | End: 2022-11-14 | Stop reason: HOSPADM

## 2022-11-10 RX ORDER — MORPHINE SULFATE 2 MG/ML
2 INJECTION, SOLUTION INTRAMUSCULAR; INTRAVENOUS
Status: DISPENSED | OUTPATIENT
Start: 2022-11-10 | End: 2022-11-10

## 2022-11-10 RX ADMIN — SENNOSIDES AND DOCUSATE SODIUM 1 TABLET: 50; 8.6 TABLET ORAL at 17:04

## 2022-11-10 RX ADMIN — SODIUM CHLORIDE, PRESERVATIVE FREE 10 ML: 5 INJECTION INTRAVENOUS at 21:48

## 2022-11-10 RX ADMIN — VANCOMYCIN HYDROCHLORIDE 1000 MG: 1 INJECTION, POWDER, LYOPHILIZED, FOR SOLUTION INTRAVENOUS at 03:42

## 2022-11-10 RX ADMIN — MORPHINE SULFATE 2 MG: 2 INJECTION, SOLUTION INTRAMUSCULAR; INTRAVENOUS at 06:09

## 2022-11-10 RX ADMIN — ASPIRIN 81 MG: 81 TABLET, COATED ORAL at 14:31

## 2022-11-10 RX ADMIN — CEFEPIME 2 G: 2 INJECTION, POWDER, FOR SOLUTION INTRAVENOUS at 05:43

## 2022-11-10 RX ADMIN — MORPHINE SULFATE 4 MG: 4 INJECTION INTRAVENOUS at 01:11

## 2022-11-10 RX ADMIN — ENOXAPARIN SODIUM 30 MG: 100 INJECTION SUBCUTANEOUS at 08:45

## 2022-11-10 RX ADMIN — Medication 1 TABLET: at 11:37

## 2022-11-10 RX ADMIN — MORPHINE SULFATE 4 MG: 4 INJECTION INTRAVENOUS at 18:20

## 2022-11-10 RX ADMIN — ACETAMINOPHEN 650 MG: 325 TABLET, FILM COATED ORAL at 20:53

## 2022-11-10 RX ADMIN — MORPHINE SULFATE 2 MG: 2 INJECTION, SOLUTION INTRAMUSCULAR; INTRAVENOUS at 08:45

## 2022-11-10 RX ADMIN — SODIUM CHLORIDE, PRESERVATIVE FREE 10 ML: 5 INJECTION INTRAVENOUS at 17:04

## 2022-11-10 RX ADMIN — MORPHINE SULFATE 4 MG: 4 INJECTION INTRAVENOUS at 14:59

## 2022-11-10 RX ADMIN — Medication 1 TABLET: at 08:45

## 2022-11-10 RX ADMIN — Medication 1 TABLET: at 17:04

## 2022-11-10 RX ADMIN — VANCOMYCIN HYDROCHLORIDE 1000 MG: 1 INJECTION, POWDER, LYOPHILIZED, FOR SOLUTION INTRAVENOUS at 14:37

## 2022-11-10 RX ADMIN — SENNOSIDES AND DOCUSATE SODIUM 1 TABLET: 50; 8.6 TABLET ORAL at 08:45

## 2022-11-10 RX ADMIN — ESCITALOPRAM OXALATE 20 MG: 10 TABLET ORAL at 14:31

## 2022-11-10 RX ADMIN — POLYETHYLENE GLYCOL 3350 17 G: 17 POWDER, FOR SOLUTION ORAL at 08:45

## 2022-11-10 RX ADMIN — AMPICILLIN AND SULBACTAM 3 G: 2; 1 INJECTION, POWDER, FOR SOLUTION INTRAMUSCULAR; INTRAVENOUS at 18:35

## 2022-11-10 RX ADMIN — MORPHINE SULFATE 4 MG: 4 INJECTION INTRAVENOUS at 11:39

## 2022-11-10 RX ADMIN — LORAZEPAM 1 MG: 1 TABLET ORAL at 21:48

## 2022-11-10 RX ADMIN — AMPICILLIN AND SULBACTAM 3 G: 2; 1 INJECTION, POWDER, FOR SOLUTION INTRAMUSCULAR; INTRAVENOUS at 23:49

## 2022-11-10 RX ADMIN — ATORVASTATIN CALCIUM 20 MG: 20 TABLET, FILM COATED ORAL at 14:31

## 2022-11-10 RX ADMIN — MORPHINE SULFATE 4 MG: 4 INJECTION INTRAVENOUS at 21:47

## 2022-11-10 RX ADMIN — IRBESARTAN 300 MG: 300 TABLET ORAL at 14:55

## 2022-11-10 RX ADMIN — CEFEPIME 2 G: 2 INJECTION, POWDER, FOR SOLUTION INTRAVENOUS at 14:37

## 2022-11-10 RX ADMIN — SODIUM CHLORIDE, PRESERVATIVE FREE 10 ML: 5 INJECTION INTRAVENOUS at 06:09

## 2022-11-10 RX ADMIN — ENOXAPARIN SODIUM 30 MG: 100 INJECTION SUBCUTANEOUS at 20:53

## 2022-11-10 NOTE — PROGRESS NOTES
Problem: Mobility Impaired (Adult and Pediatric)  Goal: *Acute Goals and Plan of Care (Insert Text)  Description: FUNCTIONAL STATUS PRIOR TO ADMISSION: Patient was modified independent using crutches for functional mobility. HOME SUPPORT PRIOR TO ADMISSION: The patient lived with family but did not require assist.    Physical Therapy Goals  Initiated 11/10/2022  1. Patient will transfer from bed to chair and chair to bed with modified independence using the least restrictive device within 7 day(s). 3.  Patient will perform sit to stand with modified independence within 7 day(s). 4.  Patient will ambulate with modified independence for 250 feet with the least restrictive device within 7 day(s). 5.  Patient will ascend/descend 15 stairs with bilateral handrail(s) with modified independence within 7 day(s). Outcome: Not Met     PHYSICAL THERAPY EVALUATION  Patient: Loretta Morocho Both [de-identified]50 y.o. male)  Date: 11/10/2022  Primary Diagnosis: Septic arthritis of right ankle (HCC) [M00.9]  Procedure(s) (LRB):  RIGHT ANKLE ARTHROTOMY, INCISION AND DRAINAGE RIGHT ANKLE (Right) 1 Day Post-Op   Precautions: falls,  WBAT (R LE with boot)    Based on current observations, pt  presents with deficits in generalized strength/AROM, static/dynamic standing balance, and  functional activity tolerance impacting overall performance of functional transfers/mobility following admission for septic arthritis of R ankle, s/p R ankle arthrotomy, incision and drainage R ankle on 11/9/22. Pt educated about Campbell Hernandez, verbalizes understanding. Pt is mod I for bed mobility, requires CGA for sit <>stand, demonstrates good static  standing balance with support, is able to ambulate - 100' with crutches, CGA with slow amber , antalgic and step to gait). Pt initially ambulated NWB R Le to the bathroom with OT, later transitioned to [address] with encouragement. Recommend d/c to home with HHPT and family care when medically appropriate.      Current Level of Function Impacting Discharge (mobility/balance): Pt requires CGA for functional transfers/mobility    Functional Outcome Measure: The patient scored 17 on the Tinetti outcome measure which is indicative of high risk for falls. Other factors to consider for discharge: Decline from functional baseline     Patient will benefit from skilled therapy intervention to address the above noted impairments. PLAN :  Recommendations and Planned Interventions: transfer training, gait training, therapeutic exercises, neuromuscular re-education, patient and family training/education, and therapeutic activities      Frequency/Duration: Patient will be followed by physical therapy:  5 times a week to address goals.     Recommendation for discharge: (in order for the patient to meet his/her long term goals)  Physical therapy at least 2 days/week in the home     This discharge recommendation:  Has been made in collaboration with the attending provider and/or case management    IF patient discharges home will need the following DME: patient owns DME required for discharge         SUBJECTIVE:   Patient stated  I have some discomfort on my R foot    OBJECTIVE DATA SUMMARY:   Pt is A& O x 4, received semi-supine in bed , agreeable for PT eval/tx  HISTORY:    Past Medical History:   Diagnosis Date    Adverse effect of anesthesia     TAKES A LONG TIME TO WAKE UP    Anxiety     Arthritis     Complex tear of medial meniscus of left knee 3/28/2019    Depression     GERD (gastroesophageal reflux disease)     HTN, goal below 140/90 10/09/2011    Hyperlipidemia 10/9/2011    Lipoma 10/9/2011    Sleep apnea     CPAP      Past Surgical History:   Procedure Laterality Date    HX ORTHOPAEDIC Left 03/28/2019    ACL replaced       Personal factors and/or comorbidities impacting plan of care:     Home Situation  Home Environment: Private residence  # Steps to Enter: 2  Rails to Enter: No  One/Two Story Residence: Two story  # of Interior Steps: 16  Interior Rails: Both  Living Alone: No  Support Systems: Spouse/Significant Other  Patient Expects to be Discharged to[de-identified] Home  Current DME Used/Available at Home: Crutches, CPAP, Shower chair    EXAMINATION/PRESENTATION/DECISION MAKING:   Critical Behavior:  Neurologic State: Alert  Orientation Level: Oriented X4  Cognition: Follows commands     Hearing: Auditory  Auditory Impairment: None  Skin:  dressing on R Le    Range Of Motion:  AROM: Generally decreased, functional    Strength:    Strength: Generally decreased, functional    Tone & Sensation:   Tone: Normal    Sensation: Intact (intact to light touch)    Functional Mobility:  Bed Mobility:  Rolling: Modified independent  Supine to Sit: Modified independent  Sit to Supine: Modified independent  Scooting: Supervision  Transfers:  Sit to Stand: Contact guard assistance  Stand to Sit: Contact guard assistance    Balance:   Sitting: Intact; Without support  Standing: Impaired; With support  Standing - Static: Good;Constant support  Standing - Dynamic : Fair;Constant support  Ambulation/Gait Training:  Distance (ft): 100 Feet (ft)  Assistive Device: Crutches;Gait belt  Ambulation - Level of Assistance: Contact guard assistance    Gait Abnormalities: Step to gait; Decreased step clearance  Right Side Weight Bearing: As tolerated (with boot)    Speed/Valencia: Slow;Pace decreased (<100 feet/min)  Step Length: Left shortened      Functional Measure:  Tinetti test:    Sitting Balance: 1  Arises: 1  Attempts to Rise: 2  Immediate Standing Balance: 1  Standing Balance: 1  Nudged: 1  Eyes Closed: 1  Turn 360 Degrees - Continuous/Discontinuous: 0  Turn 360 Degrees - Steady/Unsteady: 1  Sitting Down: 1  Balance Score: 10 Balance total score  Indication of Gait: 1  R Step Length/Height: 1  L Step Length/Height: 0  R Foot Clearance: 1  L Foot Clearance: 1  Step Symmetry: 0  Step Continuity: 1  Path: 1  Trunk: 1  Walking Time: 0  Gait Score: 7 Gait total score  Total Score: 17/28 Overall total score         Tinetti Tool Score Risk of Falls  <19 = High Fall Risk  19-24 = Moderate Fall Risk  25-28 = Low Fall Risk  Tinetti ME. Performance-Oriented Assessment of Mobility Problems in Elderly Patients. [place] [address]; [id number]. (Scoring Description: PT Bulletin Feb. 10, 1993)    Older adults: Nanytobias Cisse et al, 2009; n = 1000 [facility] elderly evaluated with ABC, MARGI, ADL, and IADL)  · Mean MARGI score for males aged 69-68 years = 26.21(3.40)  · Mean MARGI score for females age 69-68 years = 25.16(4.30)  · Mean MARGI score for males over 80 years = 23.29(6.02)  · Mean MARGI score for females over 80 years = 17.20(8.32)           Physical Therapy Evaluation Charge Determination   History Examination Presentation Decision-Making   MEDIUM  Complexity : 1-2 comorbidities / personal factors will impact the outcome/ POC  MEDIUM Complexity : 3 Standardized tests and measures addressing body structure, function, activity limitation and / or participation in recreation  LOW Complexity : Stable, uncomplicated  Other outcome measures Tinetti  HIGH       Based on the above components, the patient evaluation is determined to be of the following complexity level: LOW     Pain Rating:  Pain R Le- 5/10    Activity Tolerance:   Fair    After treatment patient left in no apparent distress:   Supine in bed, Heels elevated for pressure relief, Call bell within reach, and Side rails x 3    COMMUNICATION/EDUCATION:   The patients plan of care was discussed with: Occupational therapist and Registered nurse. Fall prevention education was provided and the patient/caregiver indicated understanding. and Patient/family agree to work toward stated goals and plan of care. Partial PT/OT session occurred together for increased pt's safety and maximum functional outcome.   Thank you for this referral.  Dorothy Arana   Time Calculation: 51 mins

## 2022-11-10 NOTE — PROGRESS NOTES
Bedside and Verbal shift change report given to Braxton Garibay RN (oncoming nurse) by Arelis Vega RN (offgoing nurse). Report included the following information SBAR, Kardex, OR Summary, Intake/Output, MAR, and Recent Results.

## 2022-11-10 NOTE — PROGRESS NOTES
Hospitalist Progress Note      NAME: Yair Small Both   :  1970  MRM:  [id number]    Date/Time: 11/10/2022  3:50 PM           Assessment / Plan:     #Septic R ankle joint: s/p I&D . Ortho and ID following   - Vanc, unsayn   - FU cx results   - will need PICC and IV abx at dc    #HTN: Cont home ARB    #Anxiety: Cont home SSRI                 Care Plan discussed with: Patient, Care Manager, and Nursing Staff    Discussed:  Care Plan    Prophylaxis:  Lovenox    Disposition:  Home w/Family           ___________________________________________________    Attending Physician: Taylor Mccracken MD        Subjective:     Chief Complaint:  No acute events overnight. Feels ok, no major complaints. Wants to take his home medications. ROS:  (bold if positive, if negative)    Tolerating PT  Tolerating Diet          Objective:       Vitals:          Last 24hrs VS reviewed since prior progress note.  Most recent are:    Visit Vitals  /76   Pulse 88   Temp 97.9 °F (36.6 °C)   Resp 18   Ht 5' 11\" (1.803 m)   Wt 122.5 kg (270 lb)   SpO2 97%   BMI 37.66 kg/m²     SpO2 Readings from Last 6 Encounters:   11/10/22 97%   21 97%   20 99%   20 95%   19 98%   11/15/19 97%    O2 Flow Rate (L/min): 2 l/min     Intake/Output Summary (Last 24 hours) at 11/10/2022 1550  Last data filed at 11/10/2022 0709  Gross per 24 hour   Intake 1070 ml   Output 950 ml   Net 120 ml          Exam:     Physical Exam:    Gen:  Well-developed, well-nourished, in no acute distress  HEENT:  Pink conjunctivae, PERRL, hearing intact to voice, moist mucous membranes  Neck:  Supple, without masses, thyroid non-tender  Resp:  No accessory muscle use, clear breath sounds without wheezes rales or rhonchi  Card:  No murmurs, normal S1, S2 without thrills, bruits or peripheral edema  Abd:  Soft, non-tender, non-distended, normoactive bowel sounds are present  Musc:  No cyanosis or clubbing, R ankle in boot  Skin:  No rashes or ulcers, skin turgor is good  Neuro:  Cranial nerves 3-12 are grossly intact,  strength is 5/5 bilaterally and dorsi / plantarflexion is 5/5 bilaterally, follows commands appropriately  Psych:  Good insight, oriented to person, place and time, alert       Medications Reviewed: (see below)    Lab Data Reviewed: (see below)    ______________________________________________________________________    Medications:     Current Facility-Administered Medications   Medication Dose Route Frequency    [START ON 11/11/2022] Vancomycin Trough Level 02:30   Other ONCE    morphine injection 2 mg  2 mg IntraVENous Q2H PRN    aspirin delayed-release tablet 81 mg  81 mg Oral DAILY    atorvastatin (LIPITOR) tablet 20 mg  20 mg Oral DAILY    escitalopram oxalate (LEXAPRO) tablet 20 mg  20 mg Oral DAILY    LORazepam (ATIVAN) tablet 1 mg  1 mg Oral Q6H PRN    ampicillin-sulbactam (UNASYN) 3 g in 0.9% sodium chloride (MBP/ADV) 100 mL MBP  3 g IntraVENous Q6H    irbesartan (AVAPRO) tablet 300 mg (Patient Supplied)  300 mg Oral DAILY    vancomycin (VANCOCIN) 1,000 mg in 0.9% sodium chloride 250 mL ([id number])  1,000 mg IntraVENous Q12H    sodium chloride (NS) flush 5-40 mL  5-40 mL IntraVENous PRN    acetaminophen (TYLENOL) tablet 650 mg  650 mg Oral Q6H PRN    Or    acetaminophen (TYLENOL) suppository 650 mg  650 mg Rectal Q6H PRN    polyethylene glycol (MIRALAX) packet 17 g  17 g Oral DAILY PRN    bisacodyL (DULCOLAX) suppository 10 mg  10 mg Rectal DAILY PRN    ondansetron (ZOFRAN) injection 4 mg  4 mg IntraVENous Q6H PRN    morphine injection 4 mg  4 mg IntraVENous Q3H PRN    0.9% sodium chloride infusion  125 mL/hr IntraVENous CONTINUOUS    sodium chloride (NS) flush 5-40 mL  5-40 mL IntraVENous Q8H    sodium chloride (NS) flush 5-40 mL  5-40 mL IntraVENous PRN    naloxone (NARCAN) injection 0.4 mg  0.4 mg IntraVENous PRN    calcium-vitamin D (OS-PEBBLES +D3) 500 mg-200 unit per tablet 1 Tablet  1 Tablet Oral TID WITH MEALS    senna-docusate (PERICOLACE) 8.6-50 mg per tablet 1 Tablet  1 Tablet Oral BID    polyethylene glycol (MIRALAX) packet 17 g  17 g Oral DAILY    [START ON 11/11/2022] bisacodyL (DULCOLAX) suppository 10 mg  10 mg Rectal DAILY PRN    enoxaparin (LOVENOX) injection 30 mg  30 mg SubCUTAneous Q12H            Lab Review:     Recent Labs     11/10/22  0256 11/09/22  1108   WBC 10.9 8.6   HGB 10.7* 12.1   HCT 33.1* 37.8   * 498*     Recent Labs     11/10/22  0256 11/09/22  1108    134*   K 4.2 4.1    99   CO2 28 29   * 99   BUN 15 22*   CREA 1.03 1.32*   CA 8.8 10.1   ALB 2.5* 3.3*   ALT 34 49     No components found for: [place]

## 2022-11-10 NOTE — CONSULTS
Infectious Disease Consult    Impression/Plan   Presumed right septic ankle. Status post I&D 11/9. Patient underwent dental cleaning 3 days prior to development of severe ankle pain suggesting odontogenic source of infection with hematogenous seeding of the ankle joint. Surgical cultures pending. Agree with vancomycin. Cefepime will be changed to Unasyn to cover oral olena. Blood cultures are sterile to date. I anticipate the patient will need PICC line and IV antibiotics at discharge   Thrombocytosis. Likely reactive due to above. Elevated creatinine. Resolved    Anti-infectives:   Vancomycin  Cefepime    Subjective:   Date of Consultation:  November 10, 2022  Date of Admission: 11/9/2022   Referring Physician:     Patient is a 50 y.o. male who is being seen for right ankle infection. Patient states that he sprained his ankle October 6 after stepping off a ladder. He was wearing a brace after the injury and the pain significantly improved. On Saturday October 22nd the pain had resolved to the point where he felt he was able to remove the brace. Later that day he developed severe pain to the point that he was unable to walk. He was in [place] at the time and was seen in the hospital where DVT was ruled out. There are no fever or chills at that time. Over the last several days however he has developed warmth and erythema involving the right ankle. He was seen by orthopedic surgery in clinic on 11/7. MRI was done which revealed a large effusion suspicious for septic arthritis. The ankle was aspirated by orthopedic surgery which revealed purulent fluid. He was sent to [facility] and underwent I&D 11/9. Cultures are pending. He is currently on vancomycin and cefepime. Surgical cultures are pending. Of note the patient underwent dental cleaning on October 19 which is 3 days prior to developing the ankle pain.   He has not been on any antibiotics prior to admission since the onset of ankle pain. The infectious diseases service has been asked to assist with antibiotic management    Patient Active Problem List   Diagnosis Code    Hyperlipidemia E78.5    Lipoma D17.9    Complex tear of medial meniscus of left knee S83.232A    Anxiety and depression F41.9, F32. A    GERD (gastroesophageal reflux disease) K21.9    HTN, goal below 140/90 I10    Sleep apnea G47.30    Adverse effect of anesthesia T41.45XA    Inguinodynia, left R10.32    Septic arthritis of right ankle (HCC) M00.9     Past Medical History:   Diagnosis Date    Adverse effect of anesthesia     TAKES A LONG TIME TO WAKE UP    Anxiety     Arthritis     Complex tear of medial meniscus of left knee 3/28/2019    Depression     GERD (gastroesophageal reflux disease)     HTN, goal below 140/90 10/09/2011    Hyperlipidemia 10/9/2011    Lipoma 10/9/2011    Sleep apnea     CPAP       Family History   Problem Relation Age of Onset    Cancer Mother         BREAST    Diabetes Mother         type 2    Hypertension Mother     Hypertension Father     Heart Attack Father         64    No Known Problems Sister     Deep Vein Thrombosis Brother     Deep Vein Thrombosis Other     Anesth Problems Neg Hx       Social History     Tobacco Use    Smoking status: Never    Smokeless tobacco: Never   Substance Use Topics    Alcohol use: Yes     Comment: 1-2 drinks a month     Past Surgical History:   Procedure Laterality Date    HX ORTHOPAEDIC Left 03/28/2019    ACL replaced      Prior to Admission medications    Medication Sig Start Date End Date Taking? Authorizing Provider   aspirin delayed-release 81 mg tablet Take 81 mg by mouth daily. Yes Provider, Historical   escitalopram oxalate (LEXAPRO) 20 mg tablet TAKE 1 TABLET BY MOUTH DAILY 12/1/20  Yes David Patel MD   irbesartan (AVAPRO) 300 mg tablet TAKE 1 TABLET BY MOUTH DAILY 8/3/20  Yes David Patel MD   atorvastatin (LIPITOR) 20 mg tablet Take 20 mg by mouth daily.    Yes Provider, Historical loratadine (CLARITIN) 10 mg tablet Take 10 mg by mouth daily. Provider, Historical     Allergies   Allergen Reactions    Peanut Nausea Only     PEANUTS: NAUSEA AND UPSET STOMACH        Review of Systems:  A comprehensive review of systems was negative except for that written in the History of Present Illness. Objective:   Blood pressure (!) 158/89, pulse 88, temperature 97.9 °F (36.6 °C), resp. rate 18, height 5' 11\" (1.803 m), weight 270 lb (122.5 kg), SpO2 97 %. Temp (24hrs), Av.1 °F (36.7 °C), Min:97.3 °F (36.3 °C), Max:99.1 °F (37.3 °C)       Exam:    General:  Alert, cooperative, well noursished, well developed, appears stated age   Eyes:  Sclera anicteric. Mouth/Throat: Mucous membranes normal   Neck: Supple   Lungs:   No distress   CV:     Abdomen:   Nondistended   Extremities: Moves all.   Right foot dressed   Skin: No acute rash on exposed skin   Lymph nodes:    Musculoskeletal:    Lines/Devices:  Peripheral   Psych: Alert and oriented, normal mood affect given the setting       Data Review:   Recent Results (from the past 24 hour(s))   CULTURE, WOUND W GRAM STAIN    Collection Time: 22  5:02 PM    Specimen: Surgical Specimen    ANKLE JOINT   Result Value Ref Range    Special Requests: NO SPECIAL REQUESTS      GRAM STAIN NO WBC'S SEEN      GRAM STAIN NO ORGANISMS SEEN      Culture result: LIGHT PROBABLE STREPTOCOCCI, BETA HEMOLYTIC (A)     CULTURE, WOUND W GRAM STAIN    Collection Time: 22  5:03 PM    Specimen: Surgical Specimen    SUBTALAR JOINT   Result Value Ref Range    Special Requests: NO SPECIAL REQUESTS      GRAM STAIN FEW WBCS SEEN      GRAM STAIN NO ORGANISMS SEEN      Culture result: LIGHT PROBABLE STREPTOCOCCI, BETA HEMOLYTIC (A)     LACTIC ACID    Collection Time: 11/10/22  2:56 AM   Result Value Ref Range    Lactic acid 0.9 0.4 - 2.0 MMOL/L   METABOLIC PANEL, COMPREHENSIVE    Collection Time: 11/10/22  2:56 AM   Result Value Ref Range    Sodium 136 136 - 145 mmol/L Potassium 4.2 3.5 - 5.1 mmol/L    Chloride 103 97 - 108 mmol/L    CO2 28 21 - 32 mmol/L    Anion gap 5 5 - 15 mmol/L    Glucose 163 (H) 65 - 100 mg/dL    BUN 15 6 - 20 MG/DL    Creatinine 1.03 0.70 - 1.30 MG/DL    BUN/Creatinine ratio 15 12 - 20      eGFR >60 >60 ml/min/1.73m2    Calcium 8.8 8.5 - 10.1 MG/DL    Bilirubin, total 0.4 0.2 - 1.0 MG/DL    ALT (SGPT) 34 12 - 78 U/L    AST (SGOT) 17 15 - 37 U/L    Alk. phosphatase 75 45 - 117 U/L    Protein, total 6.9 6.4 - 8.2 g/dL    Albumin 2.5 (L) 3.5 - 5.0 g/dL    Globulin 4.4 (H) 2.0 - 4.0 g/dL    A-G Ratio 0.6 (L) 1.1 - 2.2     CBC WITH AUTOMATED DIFF    Collection Time: 11/10/22  2:56 AM   Result Value Ref Range    WBC 10.9 4.1 - 11.1 K/uL    RBC 3.86 (L) 4.10 - 5.70 M/uL    HGB 10.7 (L) 12.1 - 17.0 g/dL    HCT 33.1 (L) 36.6 - 50.3 %    MCV 85.8 80.0 - 99.0 FL    MCH 27.7 26.0 - 34.0 PG    MCHC 32.3 30.0 - 36.5 g/dL    RDW 13.4 11.5 - 14.5 %    PLATELET 959 (H) 993 - 400 K/uL    MPV 8.1 (L) 8.9 - 12.9 FL    NRBC 0.0 0  WBC    ABSOLUTE NRBC 0.00 0.00 - 0.01 K/uL    NEUTROPHILS 88 (H) 32 - 75 %    LYMPHOCYTES 8 (L) 12 - 49 %    MONOCYTES 4 (L) 5 - 13 %    EOSINOPHILS 0 0 - 7 %    BASOPHILS 0 0 - 1 %    IMMATURE GRANULOCYTES 0 0.0 - 0.5 %    ABS. NEUTROPHILS 9.5 (H) 1.8 - 8.0 K/UL    ABS. LYMPHOCYTES 0.8 0.8 - 3.5 K/UL    ABS. MONOCYTES 0.4 0.0 - 1.0 K/UL    ABS. EOSINOPHILS 0.0 0.0 - 0.4 K/UL    ABS. BASOPHILS 0.0 0.0 - 0.1 K/UL    ABS. IMM.  GRANS. 0.0 0.00 - 0.04 K/UL    DF AUTOMATED          Microbiology:      Studies:      Signed By: Romana Catching, DO     November 10, 2022

## 2022-11-10 NOTE — PROGRESS NOTES
Problem: Falls - Risk of  Goal: *Absence of Falls  Description: Document Nati Thomas Fall Risk and appropriate interventions in the flowsheet.   Outcome: Progressing Towards Goal  Note: Fall Risk Interventions:  Mobility Interventions: Bed/chair exit alarm         Medication Interventions: Bed/chair exit alarm    Elimination Interventions: Call light in reach, Bed/chair exit alarm              Problem: Patient Education: Go to Patient Education Activity  Goal: Patient/Family Education  Outcome: Progressing Towards Goal     Problem: General Infection Care Plan (Adult and Pediatric)  Goal: Improvement in signs and symptoms of infection  Outcome: Progressing Towards Goal  Goal: *Optimize nutritional status  Outcome: Progressing Towards Goal     Problem: Patient Education: Go to Patient Education Activity  Goal: Patient/Family Education  Outcome: Progressing Towards Goal     Problem: Patient Education: Go to Patient Education Activity  Goal: Patient/Family Education  Outcome: Progressing Towards Goal     Problem: Surgical Pathway Post-Op Day 1  Goal: Activity/Safety  Outcome: Progressing Towards Goal  Goal: Diagnostic Test/Procedures  Outcome: Progressing Towards Goal  Goal: Nutrition/Diet  Outcome: Progressing Towards Goal  Goal: Discharge Planning  Outcome: Progressing Towards Goal  Goal: Medications  Outcome: Progressing Towards Goal  Goal: Respiratory  Outcome: Progressing Towards Goal  Goal: Treatments/Interventions/Procedures  Outcome: Progressing Towards Goal  Goal: Psychosocial  Outcome: Progressing Towards Goal  Goal: *No signs and symptoms of infection or wound complications  Outcome: Progressing Towards Goal  Goal: *Optimal pain control at patient's stated goal  Outcome: Progressing Towards Goal  Goal: *Adequate urinary output (equal to or greater than 30 milliliters/hour)  Outcome: Progressing Towards Goal  Goal: *Hemodynamically stable  Outcome: Progressing Towards Goal  Goal: *Tolerating diet  Outcome: Progressing Towards Goal  Goal: *Demonstrates progressive activity  Outcome: Progressing Towards Goal  Goal: *Lungs clear or at baseline  Outcome: Progressing Towards Goal

## 2022-11-10 NOTE — PROGRESS NOTES
Problem: Self Care Deficits Care Plan (Adult)  Goal: *Acute Goals and Plan of Care (Insert Text)  Description: FUNCTIONAL STATUS PRIOR TO ADMISSION: Patient was modified independent using a crutches for functional mobility secondary to boot on R foot. HOME SUPPORT: The patient lived with wife, 8th grade child, and two college children who are home for medical reasons. Occupational Therapy Goals  Initiated 11/10/2022  1. Patient will perform bathing with supervision/set-up within 7 day(s). 2.  Patient will perform lower body dressing with supervision/set-up within 7 day(s). 3.  Patient will perform toilet transfers with supervision/set-up within 7 day(s). 4.  Patient will perform all aspects of toileting with supervision/set-up within 7 day(s). 5.  Patient will participate in upper extremity therapeutic exercise/activities with supervision/set-up for 8 minutes within 7 day(s). 6.  Patient will utilize energy conservation techniques during functional activities with verbal cues within 7 day(s). Outcome: Progressing Towards Goal   OCCUPATIONAL THERAPY EVALUATION  Patient: Sudheer Holloway Both [de-identified]50 y.o. male)  Date: 11/10/2022  Primary Diagnosis: Septic arthritis of right ankle (HCC) [M00.9]  Procedure(s) (LRB):  RIGHT ANKLE ARTHROTOMY, INCISION AND DRAINAGE RIGHT ANKLE (Right) 1 Day Post-Op   Precautions: fall,   WBAT (R LE with boot)    ASSESSMENT  Based on the objective data described below, the patient presents with impaired standing balance, activity tolerance, coordination with mobility with crutches, and generalized strength necessary in ADL tasks s/p R ankle arthorotomy and I & D of POD 1. Ed on RLE WBAT with boot with fair understanding. Received sitting EOB with PT present. He ambulated to bathroom with CGA with additional time secondary to difficulty with coordinntion of crutches as he is used to RLE NWB. Sit <> stand from toilet with CGA, ed on hand placement.  He has windowsill and counter top he is able to use at home to push up from. Donned L sock with supervision with partial leg crossing and shoe with mod A secondary to snug fit with tread sock. Noted NATHAN with LB dressing tasks with seated rest prior to ambulation with PT. Preferring to doff shoe(supervision) and change to new sock (supervision additional time)following ambulation. He denies concern with donning/doffing boot as he was completing this at home with assistance from wife. Wife plans to take time off from work to assist adult children and patient. Current Level of Function Impacting Discharge (ADLs/self-care): toileting transfer CGA with crutches, shoe mod A, socks supervision    Functional Outcome Measure: The patient scored 65/100 on the Barthel Index outcome measure which is indicative of moderate impairment with ADL tasks. Other factors to consider for discharge:  Anticipate good progression with acute OT services with dc home with [facility] OT for home safety eval vs no OT with assistance from wife. He may benefit from LB dressing aide training in future session. Patient will benefit from skilled therapy intervention to address the above noted impairments. PLAN :  Recommendations and Planned Interventions: self care training, functional mobility training, therapeutic exercise, balance training, therapeutic activities, endurance activities, patient education, home safety training, and family training/education    Frequency/Duration: Patient will be followed by occupational therapy 3 times a week to address goals. Recommendation for discharge: (in order for the patient to meet his/her long term goals)  To be determined: HH vs none with a ssist from wife.   Wife plans to     This discharge recommendation:  Has been made in collaboration with the attending provider and/or case management    IF patient discharges home will need the following DME: shower chair PRN once cleared for showering       SUBJECTIVE:   Patient stated \"I am used to not putting this foot on the ground.     OBJECTIVE DATA SUMMARY:   HISTORY:   Past Medical History:   Diagnosis Date    Adverse effect of anesthesia     TAKES A LONG TIME TO WAKE UP    Anxiety     Arthritis     Complex tear of medial meniscus of left knee 3/28/2019    Depression     GERD (gastroesophageal reflux disease)     HTN, goal below 140/90 10/09/2011    Hyperlipidemia 10/9/2011    Lipoma 10/9/2011    Sleep apnea     CPAP      Past Surgical History:   Procedure Laterality Date    HX ORTHOPAEDIC Left 03/28/2019    ACL replaced       Expanded or extensive additional review of patient history:     Home Situation  Home Environment: Private residence  # Steps to Enter: 2  Rails to Enter: No  One/Two Story Residence: Two story  # of Interior Steps: 16  Interior Rails: Both  Living Alone: No  Support Systems: Spouse/Significant Other  Patient Expects to be Discharged to[de-identified] Home  Current DME Used/Available at Home: Crutches  Tub or Shower Type: Tub/Shower combination    Hand dominance: Right    EXAMINATION OF PERFORMANCE DEFICITS:  Cognitive/Behavioral Status:  Neurologic State: Alert  Orientation Level: Oriented X4  Cognition: Follows commands       Hearing: Auditory  Auditory Impairment: None    Vision/Perceptual:      Corrective Lenses: Glasses    Range of Motion:  AROM: Generally decreased, functional                         Strength:  Strength: Generally decreased, functional                Coordination:     Fine Motor Skills-Upper: Left Intact; Right Intact    Gross Motor Skills-Upper: Left Intact; Right Intact    Tone & Sensation:  Tone: Normal  Sensation: Intact (intact to light touch)     Balance:  Sitting: Intact; Without support  Standing: Impaired; With support  Standing - Static: Good;Constant support  Standing - Dynamic : Fair;Constant support    Functional Mobility and Transfers for ADLs:  Bed Mobility:  Rolling: Modified independent  Supine to Sit: Modified independent  Sit to Supine: Modified independent  Scooting: Supervision    Transfers:  Sit to Stand: Contact guard assistance  Stand to Sit: Contact guard assistance  Bathroom Mobility: Contact guard assistance  Toilet Transfer : Contact guard assistance    ADL Assessment:  Feeding: Independent    Oral Facial Hygiene/Grooming: Contact guard assistance    Bathing: Contact guard assistance    Upper Body Dressing: Setup    Lower Body Dressing: Moderate assistance    Toileting: Contact guard assistance                  ADL Intervention and task modifications:  Patient instructed and indicated understanding the benefits of maintaining activity tolerance, functional mobility, and independence with self care tasks during acute stay  to ensure safe return home and to baseline. Encouraged patient to increase frequency and duration OOB, be out of bed for all meals, perform daily ADLs (as approved by RN/MD regarding bathing etc), and performing functional mobility to/from bathroom. Provided education with patient on fall prevention for hospital and at home. This includes not getting OOB/chair/toilet without staff assistance, good lighting, good footwear, and recommended AD use. Patient with fair understanding. Provided education through multi-modal cues for crutches safety including proper positioning, hand placement,  and safety. Patient able to perform sit <> stand with CGA assistance. Fair-poor understanding of crutch use and safety. Lower Body Dressing Assistance  Socks: Supervision; Compensatory technique training (additional time)  Shoes with Cloth Laces: Moderate assistance    Toileting  Clothing Management: Compensatory technique training       Functional Measure:    Barthel Index:  Bathin  Bladder: 10  Bowels: 10  Groomin  Dressin  Feeding: 10  Mobility: 5  Stairs: 0  Toilet Use: 10  Transfer (Bed to Chair and Back): 10  Total: 65/100      The Barthel ADL Index: Guidelines  1.  The index should be used as a record of what a patient does, not as a record of what a patient could do. 2. The main aim is to establish degree of independence from any help, physical or verbal, however minor and for whatever reason. 3. The need for supervision renders the patient not independent. 4. A patient's performance should be established using the best available evidence. Asking the patient, friends/relatives and nurses are the usual sources, but direct observation and common sense are also important. However direct testing is not needed. 5. Usually the patient's performance over the preceding 24-48 hours is important, but occasionally longer periods will be relevant. 6. Middle categories imply that the patient supplies over 50 per cent of the effort. 7. Use of aids to be independent is allowed. Score Interpretation (from [facility] 83)    Independent   60-79 Minimally independent   40-59 Partially dependent   20-39 Very dependent   <20 Totally dependent     -Tara Frey., Barthel, D.W. (1965). Functional evaluation: the Barthel Index. [address] [facility] ([address] [place] [address] (1997). The Barthel activities of daily living index: self-reporting versus actual performance in the old (> or = 75 years). Journal of [address] [place] [address] 457), [facility], ..., Leland Cowden., [facility]. (1999). Measuring the change in disability after inpatient rehabilitation; comparison of the responsiveness of the Barthel Index and Functional [place] Measure. Journal of Neurology, Neurosurgery, and Psychiatry, 66(4), 812-137. Suzan Lutz, N.J.A, Seymour Infante  WJessiJ.JULIO, & Tara Hennessy MJessiA. (2004) Assessment of post-stroke quality of life in cost-effectiveness studies: The usefulness of the Barthel Index and the EuroQoL-5D.  Quality of Life Research, 15, 870-69         Occupational Therapy Evaluation Charge Determination   History Examination Decision-Making   LOW Complexity : Brief history review  LOW Complexity : 1-3 performance deficits relating to physical, cognitive , or psychosocial skils that result in activity limitations and / or participation restrictions  LOW Complexity : No comorbidities that affect functional and no verbal or physical assistance needed to complete eval tasks       Based on the above components, the patient evaluation is determined to be of the following complexity level: LOW   Pain Ratin/10, RLE    Activity Tolerance:   Fair    After treatment patient left in no apparent distress:    Supine in bed, Heels elevated for pressure relief, Call bell within reach, and RLE elevated on blankets    COMMUNICATION/EDUCATION:   The patients plan of care was discussed with: Physical therapist, Registered nurse, and Case management. Home safety education was provided and the patient/caregiver indicated understanding. and Patient/family agree to work toward stated goals and plan of care. This patients plan of care is appropriate for delegation to [facility].     Thank you for this referral.  Dayami Cowan OT  Time Calculation: 32 mins

## 2022-11-10 NOTE — PROGRESS NOTES
Problem: Falls - Risk of  Goal: *Absence of Falls  Description: Document Alpesh Young Fall Risk and appropriate interventions in the flowsheet.   Outcome: Progressing Towards Goal  Note: Fall Risk Interventions:  Mobility Interventions: Bed/chair exit alarm, Patient to call before getting OOB         Medication Interventions: Bed/chair exit alarm, Patient to call before getting OOB, Teach patient to arise slowly    Elimination Interventions: Call light in reach, Bed/chair exit alarm              Problem: Patient Education: Go to Patient Education Activity  Goal: Patient/Family Education  Outcome: Progressing Towards Goal     Problem: General Infection Care Plan (Adult and Pediatric)  Goal: Improvement in signs and symptoms of infection  Outcome: Progressing Towards Goal  Goal: *Optimize nutritional status  Outcome: Progressing Towards Goal     Problem: Patient Education: Go to Patient Education Activity  Goal: Patient/Family Education  Outcome: Progressing Towards Goal     Problem: Patient Education: Go to Patient Education Activity  Goal: Patient/Family Education  Outcome: Progressing Towards Goal     Problem: Surgical Pathway Post-Op Day 1  Goal: Activity/Safety  Outcome: Progressing Towards Goal  Goal: Diagnostic Test/Procedures  Outcome: Progressing Towards Goal  Goal: Nutrition/Diet  Outcome: Progressing Towards Goal  Goal: Discharge Planning  Outcome: Progressing Towards Goal  Goal: Medications  Outcome: Progressing Towards Goal  Goal: Respiratory  Outcome: Progressing Towards Goal  Goal: Treatments/Interventions/Procedures  Outcome: Progressing Towards Goal  Goal: Psychosocial  Outcome: Progressing Towards Goal  Goal: *No signs and symptoms of infection or wound complications  Outcome: Progressing Towards Goal  Goal: *Optimal pain control at patient's stated goal  Outcome: Progressing Towards Goal  Goal: *Adequate urinary output (equal to or greater than 30 milliliters/hour)  Outcome: Progressing Towards Goal  Goal: *Hemodynamically stable  Outcome: Progressing Towards Goal  Goal: *Tolerating diet  Outcome: Progressing Towards Goal  Goal: *Demonstrates progressive activity  Outcome: Progressing Towards Goal  Goal: *Lungs clear or at baseline  Outcome: Progressing Towards Goal

## 2022-11-10 NOTE — PROGRESS NOTES
[facility] Dosing Services: 11/9/2022    Consult for Enoxaparin by Dr. Magdalena Bustillos made for this 50 y.o. male, for prophylaxis of  DVT. Wt Readings from Last 1 Encounters:   11/09/22 122.5 kg (270 lb)       Ht Readings from Last 1 Encounters:   11/09/22 180.3 cm (71\")       Enoxaparin dose adjusted to 30 mg Q12 hrs per orthopedic surgery protocol as well as P&T protocol for TBW >101 Kg. Previous Dose 40 mg daily   Creatinine Clearance Estimated Creatinine Clearance: 89.2 mL/min (A) (based on SCr of 1.32 mg/dL (H)). Creatinine Lab Results   Component Value Date/Time    Creatinine 1.32 (H) 11/09/2022 11:08 AM       Platelet Lab Results   Component Value Date/Time    PLATELET 476 (H) 49/18/5950 11:08 AM      H/H Lab Results   Component Value Date/Time    HGB 12.1 11/09/2022 11:08 AM        Pharmacist made change to enoxaparin therapy based on:  [ X ] BMI    Pharmacy to automatically make dose adjustment for renal dysfunction (creatinine clearance less than 30 mL/min)  Pharmacy to automatically make dose adjustment for obesity (BMI greater than 40)  Pharmacy to make dose rounding adjustments per [facility] dose adjustment scale. Pharmacy to monitor patients progress. Will make dose adjustment as needed per changing renal function. Will communicate further recommendations regarding patients anticoagulation therapy with prescriber.     Signed Ben Jones, [address] [place] [address] information: [phone]

## 2022-11-11 LAB
BACTERIA SPEC CULT: ABNORMAL
BACTERIA SPEC CULT: ABNORMAL
BACTERIA SPEC CULT: NORMAL
BACTERIA SPEC CULT: NORMAL
CREAT SERPL-MCNC: 1.09 MG/DL (ref 0.7–1.3)
DATE LAST DOSE: NORMAL
GRAM STN SPEC: ABNORMAL
GRAM STN SPEC: ABNORMAL
REPORTED DOSE,DOSE: NORMAL UNITS
REPORTED DOSE/TIME,TMG: 1500
SERVICE CMNT-IMP: ABNORMAL
SERVICE CMNT-IMP: NORMAL
SERVICE CMNT-IMP: NORMAL
VANCOMYCIN TROUGH SERPL-MCNC: 6.3 UG/ML (ref 5–10)

## 2022-11-11 PROCEDURE — 82565 ASSAY OF CREATININE: CPT

## 2022-11-11 PROCEDURE — 94761 N-INVAS EAR/PLS OXIMETRY MLT: CPT

## 2022-11-11 PROCEDURE — 74011250637 HC RX REV CODE- 250/637: Performed by: INTERNAL MEDICINE

## 2022-11-11 PROCEDURE — 36415 COLL VENOUS BLD VENIPUNCTURE: CPT

## 2022-11-11 PROCEDURE — 80202 ASSAY OF VANCOMYCIN: CPT

## 2022-11-11 PROCEDURE — 74011250636 HC RX REV CODE- 250/636: Performed by: INTERNAL MEDICINE

## 2022-11-11 PROCEDURE — 74011250636 HC RX REV CODE- 250/636: Performed by: EMERGENCY MEDICINE

## 2022-11-11 PROCEDURE — 97116 GAIT TRAINING THERAPY: CPT

## 2022-11-11 PROCEDURE — 99232 SBSQ HOSP IP/OBS MODERATE 35: CPT | Performed by: INTERNAL MEDICINE

## 2022-11-11 PROCEDURE — 74011250637 HC RX REV CODE- 250/637: Performed by: HOSPITALIST

## 2022-11-11 PROCEDURE — 65270000029 HC RM PRIVATE

## 2022-11-11 PROCEDURE — 74011000250 HC RX REV CODE- 250: Performed by: ORTHOPAEDIC SURGERY

## 2022-11-11 PROCEDURE — 74011250636 HC RX REV CODE- 250/636: Performed by: HOSPITALIST

## 2022-11-11 PROCEDURE — 74011250637 HC RX REV CODE- 250/637: Performed by: ORTHOPAEDIC SURGERY

## 2022-11-11 PROCEDURE — 74011250637 HC RX REV CODE- 250/637: Performed by: NURSE PRACTITIONER

## 2022-11-11 PROCEDURE — 74011000258 HC RX REV CODE- 258: Performed by: INTERNAL MEDICINE

## 2022-11-11 PROCEDURE — 74011250636 HC RX REV CODE- 250/636: Performed by: ORTHOPAEDIC SURGERY

## 2022-11-11 PROCEDURE — 74011000250 HC RX REV CODE- 250: Performed by: INTERNAL MEDICINE

## 2022-11-11 RX ORDER — VANCOMYCIN/0.9 % SOD CHLORIDE 1.5G/250ML
1500 PLASTIC BAG, INJECTION (ML) INTRAVENOUS EVERY 12 HOURS
Status: DISCONTINUED | OUTPATIENT
Start: 2022-11-11 | End: 2022-11-13

## 2022-11-11 RX ORDER — OXYCODONE HYDROCHLORIDE 5 MG/1
10 TABLET ORAL
Status: DISCONTINUED | OUTPATIENT
Start: 2022-11-11 | End: 2022-11-11

## 2022-11-11 RX ORDER — HYDROCODONE BITARTRATE AND ACETAMINOPHEN 5; 325 MG/1; MG/1
1 TABLET ORAL
Status: DISCONTINUED | OUTPATIENT
Start: 2022-11-11 | End: 2022-11-14 | Stop reason: HOSPADM

## 2022-11-11 RX ADMIN — ESCITALOPRAM OXALATE 20 MG: 10 TABLET ORAL at 09:51

## 2022-11-11 RX ADMIN — POLYETHYLENE GLYCOL 3350 17 G: 17 POWDER, FOR SOLUTION ORAL at 09:52

## 2022-11-11 RX ADMIN — MORPHINE SULFATE 4 MG: 4 INJECTION INTRAVENOUS at 11:21

## 2022-11-11 RX ADMIN — AMPICILLIN AND SULBACTAM 3 G: 2; 1 INJECTION, POWDER, FOR SOLUTION INTRAMUSCULAR; INTRAVENOUS at 06:05

## 2022-11-11 RX ADMIN — MORPHINE SULFATE 4 MG: 4 INJECTION INTRAVENOUS at 05:37

## 2022-11-11 RX ADMIN — Medication 1 TABLET: at 09:51

## 2022-11-11 RX ADMIN — LORAZEPAM 1 MG: 1 TABLET ORAL at 20:46

## 2022-11-11 RX ADMIN — SENNOSIDES AND DOCUSATE SODIUM 1 TABLET: 50; 8.6 TABLET ORAL at 09:51

## 2022-11-11 RX ADMIN — HYDROCODONE BITARTRATE AND ACETAMINOPHEN 1 TABLET: 5; 325 TABLET ORAL at 16:42

## 2022-11-11 RX ADMIN — ASPIRIN 81 MG: 81 TABLET, COATED ORAL at 09:50

## 2022-11-11 RX ADMIN — SODIUM CHLORIDE, PRESERVATIVE FREE 10 ML: 5 INJECTION INTRAVENOUS at 21:17

## 2022-11-11 RX ADMIN — SODIUM CHLORIDE, PRESERVATIVE FREE 10 ML: 5 INJECTION INTRAVENOUS at 06:05

## 2022-11-11 RX ADMIN — VANCOMYCIN HYDROCHLORIDE 1000 MG: 1 INJECTION, POWDER, LYOPHILIZED, FOR SOLUTION INTRAVENOUS at 04:14

## 2022-11-11 RX ADMIN — SENNOSIDES AND DOCUSATE SODIUM 1 TABLET: 50; 8.6 TABLET ORAL at 16:42

## 2022-11-11 RX ADMIN — IRBESARTAN 300 MG: 300 TABLET ORAL at 10:00

## 2022-11-11 RX ADMIN — ENOXAPARIN SODIUM 30 MG: 100 INJECTION SUBCUTANEOUS at 09:52

## 2022-11-11 RX ADMIN — SODIUM CHLORIDE, PRESERVATIVE FREE 10 ML: 5 INJECTION INTRAVENOUS at 13:18

## 2022-11-11 RX ADMIN — Medication 1 TABLET: at 16:42

## 2022-11-11 RX ADMIN — Medication 1 TABLET: at 11:21

## 2022-11-11 RX ADMIN — VANCOMYCIN HYDROCHLORIDE 1500 MG: 10 INJECTION, POWDER, LYOPHILIZED, FOR SOLUTION INTRAVENOUS at 13:03

## 2022-11-11 RX ADMIN — HYDROCODONE BITARTRATE AND ACETAMINOPHEN 1 TABLET: 5; 325 TABLET ORAL at 20:46

## 2022-11-11 RX ADMIN — ENOXAPARIN SODIUM 30 MG: 100 INJECTION SUBCUTANEOUS at 20:46

## 2022-11-11 RX ADMIN — ACETAMINOPHEN 650 MG: 325 TABLET, FILM COATED ORAL at 09:51

## 2022-11-11 RX ADMIN — SODIUM CHLORIDE, PRESERVATIVE FREE 2 G: 5 INJECTION INTRAVENOUS at 13:03

## 2022-11-11 RX ADMIN — ATORVASTATIN CALCIUM 20 MG: 20 TABLET, FILM COATED ORAL at 09:51

## 2022-11-11 NOTE — PROGRESS NOTES
Spiritual Care Partner Volunteer visited patient at [address] in [facility] 4M POST SURG ORT 1 on 11/11/2022 . Documented by:  Cal Donaldson.  Judy Padron, [address] [place] [address] paging Service [phone])

## 2022-11-11 NOTE — PROGRESS NOTES
Problem: Falls - Risk of  Goal: *Absence of Falls  Description: Document Nati Thomas Fall Risk and appropriate interventions in the flowsheet.   Outcome: Progressing Towards Goal  Note: Fall Risk Interventions:  Mobility Interventions: Bed/chair exit alarm, OT consult for ADLs, Patient to call before getting OOB, PT Consult for mobility concerns, PT Consult for assist device competence, Utilize walker, cane, or other assistive device, Utilize gait belt for transfers/ambulation         Medication Interventions: Patient to call before getting OOB, Teach patient to arise slowly, Bed/chair exit alarm, Evaluate medications/consider consulting pharmacy    Elimination Interventions: Bed/chair exit alarm, Call light in reach, Patient to call for help with toileting needs, Toileting schedule/hourly rounds              Problem: Surgical Pathway Post-Op Day 1  Goal: Activity/Safety  Outcome: Progressing Towards Goal  Goal: Diagnostic Test/Procedures  Outcome: Progressing Towards Goal  Goal: Nutrition/Diet  Outcome: Progressing Towards Goal  Goal: Medications  Outcome: Progressing Towards Goal  Goal: Respiratory  Outcome: Progressing Towards Goal  Goal: Treatments/Interventions/Procedures  Outcome: Progressing Towards Goal  Goal: Psychosocial  Outcome: Progressing Towards Goal  Goal: *Optimal pain control at patient's stated goal  Outcome: Progressing Towards Goal  Goal: *Hemodynamically stable  Outcome: Progressing Towards Goal  Goal: *Tolerating diet  Outcome: Progressing Towards Goal  Goal: *Demonstrates progressive activity  Outcome: Progressing Towards Goal  Goal: *Lungs clear or at baseline  Outcome: Progressing Towards Goal

## 2022-11-11 NOTE — PROGRESS NOTES
Problem: Falls - Risk of  Goal: *Absence of Falls  Description: Document [place] Fail Fall Risk and appropriate interventions in the flowsheet.   Outcome: Progressing Towards Goal  Note: Fall Risk Interventions:  Mobility Interventions: Bed/chair exit alarm         Medication Interventions: Bed/chair exit alarm    Elimination Interventions: Bed/chair exit alarm              Problem: Patient Education: Go to Patient Education Activity  Goal: Patient/Family Education  Outcome: Progressing Towards Goal     Problem: General Infection Care Plan (Adult and Pediatric)  Goal: Improvement in signs and symptoms of infection  Outcome: Progressing Towards Goal  Goal: *Optimize nutritional status  Outcome: Progressing Towards Goal     Problem: Patient Education: Go to Patient Education Activity  Goal: Patient/Family Education  Outcome: Progressing Towards Goal     Problem: Patient Education: Go to Patient Education Activity  Goal: Patient/Family Education  Outcome: Progressing Towards Goal     Problem: Surgical Pathway Post-Op Day 1  Goal: Activity/Safety  Outcome: Progressing Towards Goal  Goal: Diagnostic Test/Procedures  Outcome: Progressing Towards Goal  Goal: Nutrition/Diet  Outcome: Progressing Towards Goal  Goal: Discharge Planning  Outcome: Progressing Towards Goal  Goal: Medications  Outcome: Progressing Towards Goal  Goal: Respiratory  Outcome: Progressing Towards Goal  Goal: Treatments/Interventions/Procedures  Outcome: Progressing Towards Goal  Goal: Psychosocial  Outcome: Progressing Towards Goal  Goal: *No signs and symptoms of infection or wound complications  Outcome: Progressing Towards Goal  Goal: *Optimal pain control at patient's stated goal  Outcome: Progressing Towards Goal  Goal: *Adequate urinary output (equal to or greater than 30 milliliters/hour)  Outcome: Progressing Towards Goal  Goal: *Hemodynamically stable  Outcome: Progressing Towards Goal  Goal: *Tolerating diet  Outcome: Progressing Towards Goal  Goal: *Demonstrates progressive activity  Outcome: Progressing Towards Goal  Goal: *Lungs clear or at baseline  Outcome: Progressing Towards Goal     Problem: Patient Education: Go to Patient Education Activity  Goal: Patient/Family Education  Outcome: Progressing Towards Goal     Problem: Patient Education: Go to Patient Education Activity  Goal: Patient/Family Education  Outcome: Progressing Towards Goal

## 2022-11-11 NOTE — PROGRESS NOTES
11/11/2022    6:05 PM  CM consult for RW noted. CM completed referral via [employer] to [facility], and they accepted. RW delivered, and invoice attached in AllScripts. 4:51 PM  Care Management Assessment      Reason for Admission: Emergency -       ICD-10-CM ICD-9-CM    1. Septic arthritis of right ankle, due to unspecified organism ([address]  M00.9 711.07       2. Thrombocytosis  D75.839 238.71       3. HUGH (acute kidney injury) ([facility]  N17.9 584.9           Assessment:   [x]In person with pt   []Via p/c with pt   []With family member in person. Who/Relation:     []With family member via p/c. Who/Relation:   []Chart Review    RUR: 8%  Risk Level: [x]Low []Moderate []High  Value-based purchasing: [] Yes [x] No    Advance Directive: Full Code. [x] No AD on file. [] AD on file. [] Current AD not on file. Copy requested. [] Requests AD, and referral submitted to [facility]. Healthcare Decision Maker: Cristiano Luu Both        Assessment:    Age: 50 y.o. Sex: [x] Male []Female     Residency: [x]Private residence []Apartment []Assisted Living []LTC []Other:   Exterior Steps: 2  Interior Steps: 1 flight    Lives With: [x]With spouse []Other family members []Underage children []Alone []Care provider []Other:    Prior functioning:  [x]Independent with ADLs and iADLS []Dependent with ADLs and iADLs []Partial dependence, Specify:     Cognition: [x]Intact []Some spheres some of the time. []Some spheres all of the time. []All spheres all of the time.      Prior transportation method: [x]Self [x]Spouse []Other family members []Medicaid transport []Other:     Prior DME required:  []None []RW []Cane [x]Crutches []Bedside commode []CPAP []Home O2 (Liter/Provider: ) []Nebulizer   []Shower Chair []Wheelchair []Hospital Bed []Catarino []Stair lift []Rollator []Other:    DME available: []None []RW []Cane []Crutches []Bedside commode []CPAP []Home O2 (Liter/Provider: ) []Nebulizer   []Shower Chair []Wheelchair []Hospital Bed []Catarino []Stair lift []Rollator [x]Other: RW needed prior to DC. Rehab history: []None [x]Outpatient PT []Home Health (Provider/Date: ) []SNF (Provider/Date: ) []IPR (Provider/Date: ) []LTC (Provider/Date: ) []Hospice (Provider/Date: )  []Other:     Covid vaccination status:   [] Yes, Type:  [] Booster 1 [] Booster 2  [] No  [x] N/A / Not asked    Insurer:   Insurance Information                  [employer] [facility] [facility] Phone: --    [employer] Subscriber#: [id number]    Group#: [id number] Precert#: --            PCP: [facility]   Address: [address] [place] [address] / [facility] [address]   Phone number: [phone]   Current patient: [x]Yes []No   Approximate date of last visit: within 1 year   Access to virtual PCP visits: []Yes [x]No     Financial concerns/barriers: []Yes, explain: [x]No []Unknown/Not discussed    Pharmacy: [facility] Transport: Family     Discharge Concerns: []Yes [x]No []Unknown   Describe:    Comments:           Transition of care plan:    [x]Unable to determine at this time. Awaiting clinical progress, and disposition recommendations. Likely home with HH and IV abx. Awaiting ID order. CM consult for RW noted. RW to be delivered closer to DC.     [] Home with family assistance as needed, and outpatient follow-up. [] Home with Outpatient PT and outpatient follow-up   Pt aware of OP appt? []Yes, Provider:   []Not scheduled   Transport provider:     [] Home with Home Health   - Provider:     []SNF/IPR   -[]Preferences given:   []Listing provided and preferences requested   -Status: []Pending []Accepted:    -Auth required: []Yes []No    -Auth initiated date:   -3 midnight stay required: []Yes []No  Date satisfied:     [] LTC:     [] Home with Hospice   -Provider:     [] [facility] information provided.      [] Other:     Damon Barr MA    Care Management Interventions  Mode of Transport at Discharge: Other (see comment)  Transition of Care Consult (CM Consult): Discharge Planning  MyChart Signup: No (RW)  Discharge Durable Medical Equipment: Yes  Physical Therapy Consult: Yes  Occupational Therapy Consult: Yes  Speech Therapy Consult: No  Support Systems: Spouse/Significant Other  Confirm Follow Up Transport: Family  The Plan for Transition of Care is Related to the Following Treatment Goals : Septic right ankle  The Patient and/or Patient Representative was Provided with a Choice of Provider and Agrees with the Discharge Plan?: (S) Yes  Name of the Patient Representative Who was Provided with a Choice of Provider and Agrees with the Discharge Plan: Self  Freedom of Choice List was Provided with Basic Dialogue that Supports the Patient's Individualized Plan of Care/Goals, Treatment Preferences and Shares the Quality Data Associated with the Providers?: (S) Yes  Discharge Location  Patient Expects to be Discharged to[de-identified] Home with home health

## 2022-11-11 NOTE — PROGRESS NOTES
Orthopedic NP Progress Note  Post Op day: 2 Days Post-Op    November 11, 2022 10:40 AM     Loretta Morocho Both    Attending Physician: Treatment Team: Attending Provider: Meir Mendoza MD; Physician Assistant: ALINA Echols Arm; Consulting Provider: Duane Dumont DO; Surgeon: Elma Hanley MD; Utilization Review: Maricarmen Henao RN; Primary Nurse: Holli Lambert RN; Physical Therapist: Shona Ojeda, PT, DPT     Vital Signs:    Patient Vitals for the past 8 hrs:   BP Temp Pulse Resp SpO2   11/11/22 0859 (!) 145/81 98 °F (36.7 °C) 98 17 97 %   11/11/22 0339 111/79 97.8 °F (36.6 °C) 80 17 95 %     BMI (calculated): 37.7 (11/09/22 1054)    Intake/Output:  11/11 0701 - 11/11 1900  In: 120 [P.O.:120]  Out: 200 [Urine:200]  11/09 1901 - 11/11 0700  In: 1781.3 [P.O.:700; I.V.:1081.3]  Out: 1500 [Urine:1500]    Pain Control:   Pain Assessment  Pain Scale 1: Numeric (0 - 10)  Pain Intensity 1: 3  Pain Onset 1: post op  Pain Location 1: Leg  Pain Orientation 1: Right  Pain Description 1: Aching  Pain Intervention(s) 1: Medication (see MAR)    LAB:    Recent Labs     11/10/22  0256   HCT 33.1*   HGB 10.7*     Lab Results   Component Value Date/Time    Sodium 136 11/10/2022 02:56 AM    Potassium 4.2 11/10/2022 02:56 AM    Chloride 103 11/10/2022 02:56 AM    CO2 28 11/10/2022 02:56 AM    Glucose 163 (H) 11/10/2022 02:56 AM    BUN 15 11/10/2022 02:56 AM    Creatinine 1.09 11/11/2022 02:23 AM    Calcium 8.8 11/10/2022 02:56 AM       Subjective:  Carisa Ramírez is a 50 y.o. male s/p a  Procedure(s):  RIGHT ANKLE ARTHROTOMY, INCISION AND DRAINAGE RIGHT ANKLE   Procedure(s):  RIGHT ANKLE ARTHROTOMY, INCISION AND DRAINAGE RIGHT ANKLE. Tolerating diet. Pain is well managed has been up        Objective: General: alert, cooperative, no distress. Cardiac: Normal S1&S2, no murmur. Resp: BBS clear, no wheezing. Gastrointestinal:  Soft, non-tender. Neuro/Vascular: CNS Intact. Sensation stable.  Brisk cap refill, 2+ pulses Patient is a 62y old  Female who presents with a chief complaint of left knee pain s/p left total knee replacement (13 Jun 2022 10:11)        HPI:  72 yo F S/P L TKR     SUBJECTIVE & OBJECTIVE: Pt seen and examined at bedside. nad    PHYSICAL EXAM:  T(C): 37 (06-14-22 @ 08:08), Max: 37 (06-14-22 @ 03:00)  HR: 80 (06-14-22 @ 08:08) (67 - 89)  BP: 114/76 (06-14-22 @ 08:08) (90/59 - 123/79)  RR: 16 (06-14-22 @ 08:08) (11 - 19)  SpO2: 95% (06-14-22 @ 08:08) (94% - 100%)  Wt(kg): --   GENERAL: NAD, well-groomed, well-developed  HEAD:  Atraumatic, Normocephalic  NERVOUS SYSTEM:  Alert & Oriented X3,   CHEST/LUNG: Clear to auscultation bilaterally; No rales, rhonchi, wheezing, or rubs  HEART: Regular rate and rhythm; No murmurs, rubs, or gallops  ABDOMEN: Soft, Nontender, Nondistended; Bowel sounds present  EXTREMITIES:  2+ Peripheral Pulses, No clubbing, cyanosis, or edema        MEDICATIONS  (STANDING):  acetaminophen     Tablet .. 1000 milliGRAM(s) Oral every 8 hours  aspirin enteric coated 81 milliGRAM(s) Oral every 12 hours  celecoxib 200 milliGRAM(s) Oral every 12 hours  lactated ringers. 1000 milliLiter(s) (100 mL/Hr) IV Continuous <Continuous>  multivitamin 1 Tablet(s) Oral daily  pantoprazole    Tablet 40 milliGRAM(s) Oral before breakfast  polyethylene glycol 3350 17 Gram(s) Oral at bedtime  senna 2 Tablet(s) Oral at bedtime    MEDICATIONS  (PRN):  HYDROmorphone  Injectable 0.5 milliGRAM(s) IV Push every 3 hours PRN breakthrough  magnesium hydroxide Suspension 30 milliLiter(s) Oral daily PRN Constipation  ondansetron Injectable 4 milliGRAM(s) IV Push every 6 hours PRN Nausea and/or Vomiting  oxyCODONE    IR 5 milliGRAM(s) Oral every 3 hours PRN Moderate Pain (4 - 6)  oxyCODONE    IR 10 milliGRAM(s) Oral every 3 hours PRN Severe Pain (7 - 10)      LABS:                        10.8   9.34  )-----------( 160      ( 14 Jun 2022 07:48 )             33.3     06-14    136  |  102  |  10  ----------------------------<  138<H>  4.5   |  28  |  0.79    Ca    8.7      14 Jun 2022 07:48            CAPILLARY BLOOD GLUCOSE          CAPILLARY BLOOD GLUCOSE        CAPILLARY BLOOD GLUCOSE                RECENT CULTURES:      RADIOLOGY & ADDITIONAL TESTS:                        DVT/GI ppx  Discussed with pt @ bedside UE/LE  Musculoskeletal:  +ROM UE/LE, + expected post op R ankle edema, NVI . Skin: Incision - clean with dry drainage as noted, penrose drain on lateral and medical side. Dressing: + dry bloody drainage noted     Burt - n  Drain - n       PT/OT:   Gait:  Gait  Speed/Valencia: Slow, Pace decreased (<100 feet/min)  Step Length: Left shortened  Gait Abnormalities: Step to gait, Decreased step clearance  Ambulation - Level of Assistance: Contact guard assistance  Distance (ft): 100 Feet (ft)  Assistive Device: Crutches, Gait belt                   Assessment:    s/p Procedure(s):  RIGHT ANKLE ARTHROTOMY, INCISION AND DRAINAGE RIGHT ANKLE    Active Problems:    Septic arthritis of right ankle ([address] (11/9/2022)         Plan:   -  R septic ankle - s/p I&D with IV abx as per Dr. Marisol Blanchard, cultures pending, dressing changed today, penrose in place and should remain in place, continue BID dressing changes with xeroform, 4x4s, kerlix and ace wrap (order placed nursing to complete in the PM)   - Continue PT/OT - WBAT with boot in place and keep in boot while at rest, may ROM ankle if not boot for wound care and skin care   -  Continue established methods of pain control  -  VTE Prophylaxes - TEDS &/or SCDs with Lovenox with plan for ASA 81mg BID at discharge         Discharge To:   Home with HH once abx plan finalized, likely Monday       Signed By: Hemanth Boothe NP    Orthopedic Nurse Practitioner

## 2022-11-11 NOTE — PROGRESS NOTES
Problem: Mobility Impaired (Adult and Pediatric)  Goal: *Acute Goals and Plan of Care (Insert Text)  Description: FUNCTIONAL STATUS PRIOR TO ADMISSION: Patient was modified independent using a crutches for functional mobility. HOME SUPPORT PRIOR TO ADMISSION: The patient lived with family but did not require assist.    Physical Therapy Goals  Initiated 11/10/2022  1. Patient will transfer from bed to chair and chair to bed with modified independence using the least restrictive device within 7 day(s). 3.  Patient will perform sit to stand with modified independence within 7 day(s). 4.  Patient will ambulate with modified independence for 250 feet with the least restrictive device within 7 day(s). 5.  Patient will ascend/descend 15 stairs with bilateral handrail(s) with modified independence within 7 day(s). Note:   PHYSICAL THERAPY TREATMENT  Patient: Faheem Gomes Both [de-identified]50 y.o. male)  Date: 11/11/2022  Diagnosis: Septic arthritis of right ankle (HCC) [M00.9] <principal problem not specified>  Procedure(s) (LRB):  RIGHT ANKLE ARTHROTOMY, INCISION AND DRAINAGE RIGHT ANKLE (Right) 2 Days Post-Op  Precautions: WBAT (R LE with boot)  Chart, physical therapy assessment, plan of care and goals were reviewed. ASSESSMENT  Patient continues with skilled PT services and is progressing towards goals. Patient premedicated for tx and reporting more ankle pain with amb in CAM boot today using crutches. Offered trial use of loft strand crutches, knee scooter, or RW. Patient agreeable to trial RW and prefers RW. Patient's crutches broken- repaired. Up to chair with RLE elevated    Current Level of Function Impacting Discharge (mobility/balance): overall CG A with crutches and S/SBA using RW    Other factors to consider for discharge: PICC and home abx noted as plan         PLAN :  Patient continues to benefit from skilled intervention to address the above impairments. Continue treatment per established plan of care.   to address goals. Recommendation for discharge: (in order for the patient to meet his/her long term goals)  Physical therapy at least 2 days/week in the home     This discharge recommendation:  Has not yet been discussed the attending provider and/or case management    IF patient discharges home will need the following DME: rolling walker requested       SUBJECTIVE:   Patient stated .    OBJECTIVE DATA SUMMARY:   Critical Behavior:  Neurologic State: Alert  Orientation Level: Oriented X4  Cognition: Appropriate decision making, Appropriate for age attention/concentration, Appropriate safety awareness, Follows commands     Functional Mobility Training:  Bed Mobility:  Rolling: Modified independent  Supine to Sit: Modified independent  Sit to Supine: Modified independent  Scooting: Modified independent        Transfers:  Sit to Stand: Supervision  Stand to Sit: Supervision  Stand Pivot Transfers: Modified independent                          Balance:  Sitting: Intact; Without support  Standing: Intact; With support  Standing - Static: Constant support;Good  Standing - Dynamic : Constant support;Good  Ambulation/Gait Training:  Distance (ft): 100 Feet (ft)  Assistive Device: Gait belt;Crutches;Walker, rolling  Ambulation - Level of Assistance: Contact guard assistance        Gait Abnormalities: Step to gait  Right Side Weight Bearing: As tolerated     Base of Support: Widened     Speed/Valencia: Slow;Pace decreased (<100 feet/min)      Therapeutic Exercises:     Pain Ratin/10 L ankle    Activity Tolerance:   Good    After treatment patient left in no apparent distress:   Sitting in chair and Call bell within reach    COMMUNICATION/COLLABORATION:   The patients plan of care was discussed with: Registered nurse.      Bibi Taylor PT, DPT   Time Calculation: 40 mins

## 2022-11-11 NOTE — PROGRESS NOTES
Bedside and Verbal shift change report given to Canelo Murillo RN (oncoming nurse) by Araceli Macario RN (offgoing nurse). Report included the following information SBAR, Kardex, OR Summary, Intake/Output, MAR, and Recent Results.

## 2022-11-11 NOTE — PROGRESS NOTES
[facility]  [address] [facility], [place], [address] [place] [address]  [phone]      Hospitalist Progress Note      NAME: Ady Stratton Both   :  1970  MRM:  [id number]    Date of service: 2022  10:06 AM       Assessment and Plan:   R ankle septic joint: s/p I&D . Ortho and ID following. Cont Vanc, unsayn. cx shows: strep beta hemolytic. Will need PICC and IV abx at dc     2. HTN: Cont home Avapro     3. Anxiety: Cont home lexapro    4. Obesity. Would benefit from wt loss              Subjective:     Chief Complaint[de-identified] Patient was seen and examined as a follow up for septic joint. Chart was reviewed. c/o RT leg soreness    ROS:  (bold if positive, if negative)    Tolerating PT  Tolerating Diet        Objective:     Last 24hrs VS reviewed since prior progress note.  Most recent are:    Visit Vitals  BP (!) 145/81 (BP 1 Location: Right lower arm, BP Patient Position: At rest)   Pulse 98   Temp 98 °F (36.7 °C)   Resp 17   Ht 5' 11\" (1.803 m)   Wt 122.5 kg (270 lb)   SpO2 97%   BMI 37.66 kg/m²     SpO2 Readings from Last 6 Encounters:   22 97%   21 97%   20 99%   20 95%   19 98%   11/15/19 97%    O2 Flow Rate (L/min): 2 l/min     Intake/Output Summary (Last 24 hours) at 2022 1006  Last data filed at 2022 0859  Gross per 24 hour   Intake 1251.25 ml   Output 750 ml   Net 501.25 ml        Physical Exam:    Gen:  obese, in no acute distress  HEENT:  Pink conjunctivae, PERRL, hearing intact to voice, moist mucous membranes  Neck:  Supple, without masses, thyroid non-tender  Resp:  No accessory muscle use, clear breath sounds without wheezes rales or rhonchi  Card:  No murmurs, normal S1, S2 without thrills, bruits or peripheral edema  Abd:  Soft, non-tender, non-distended, normoactive bowel sounds are present, no palpable organomegaly and no detectable hernias  Lymph:  No cervical or inguinal adenopathy  Musc:  No cyanosis or clubbing  Skin:  No rashes or ulcers, skin turgor is good  Neuro:  Cranial nerves are grossly intact, no focal motor weakness, follows commands appropriately  Psych:  Good insight, oriented to person, place and time, alert  __________________________________________________________________  Medications Reviewed: (see below)  Medications:     Current Facility-Administered Medications   Medication Dose Route Frequency    aspirin delayed-release tablet 81 mg  81 mg Oral DAILY    atorvastatin (LIPITOR) tablet 20 mg  20 mg Oral DAILY    escitalopram oxalate (LEXAPRO) tablet 20 mg  20 mg Oral DAILY    LORazepam (ATIVAN) tablet 1 mg  1 mg Oral Q6H PRN    ampicillin-sulbactam (UNASYN) 3 g in 0.9% sodium chloride (MBP/ADV) 100 mL MBP  3 g IntraVENous Q6H    irbesartan (AVAPRO) tablet 300 mg (Patient Supplied)  300 mg Oral DAILY    vancomycin (VANCOCIN) 1,000 mg in 0.9% sodium chloride 250 mL (Qexp0Ohn)  1,000 mg IntraVENous Q12H    sodium chloride (NS) flush 5-40 mL  5-40 mL IntraVENous PRN    acetaminophen (TYLENOL) tablet 650 mg  650 mg Oral Q6H PRN    Or    acetaminophen (TYLENOL) suppository 650 mg  650 mg Rectal Q6H PRN    polyethylene glycol (MIRALAX) packet 17 g  17 g Oral DAILY PRN    bisacodyL (DULCOLAX) suppository 10 mg  10 mg Rectal DAILY PRN    ondansetron (ZOFRAN) injection 4 mg  4 mg IntraVENous Q6H PRN    morphine injection 4 mg  4 mg IntraVENous Q3H PRN    sodium chloride (NS) flush 5-40 mL  5-40 mL IntraVENous Q8H    sodium chloride (NS) flush 5-40 mL  5-40 mL IntraVENous PRN    naloxone (NARCAN) injection 0.4 mg  0.4 mg IntraVENous PRN    calcium-vitamin D (OS-PEBBLES +D3) 500 mg-200 unit per tablet 1 Tablet  1 Tablet Oral TID WITH MEALS    senna-docusate (PERICOLACE) 8.6-50 mg per tablet 1 Tablet  1 Tablet Oral BID    polyethylene glycol (MIRALAX) packet 17 g  17 g Oral DAILY    bisacodyL (DULCOLAX) suppository 10 mg  10 mg Rectal DAILY PRN    enoxaparin (LOVENOX) injection 30 mg  30 mg SubCUTAneous Q12H        Lab Data Reviewed: (see below)  Lab Review:     Recent Labs     11/10/22  0256 11/09/22  1108   WBC 10.9 8.6   HGB 10.7* 12.1   HCT 33.1* 37.8   * 498*     Recent Labs     11/11/22  0223 11/10/22  0256 11/09/22  1108   NA  --  136 134*   K  --  4.2 4.1   CL  --  103 99   CO2  --  28 29   GLU  --  163* 99   BUN  --  15 22*   CREA 1.09 1.03 1.32*   CA  --  8.8 10.1   ALB  --  2.5* 3.3*   TBILI  --  0.4 0.6   ALT  --  34 49     Lab Results   Component Value Date/Time    Glucose (POC) 106 (H) 03/28/2019 08:35 AM     No results for input(s): PH, PCO2, PO2, HCO3, FIO2 in the last 72 hours. No results for input(s): INR, INREXT in the last 72 hours. All Micro Results       Procedure Component Value Units Date/Time    CULTURE, BLOOD, PAIRED [[id number]] Collected: 11/09/22 1255    Order Status: Completed Specimen: Blood Updated: 11/11/22 0213     Special Requests: NO SPECIAL REQUESTS        Culture result: NO GROWTH 2 DAYS       CULTURE, WOUND Duaine Tonie STAIN [[id number]]  (Abnormal) Collected: 11/09/22 1703    Order Status: Completed Specimen: Surgical Specimen Updated: 11/10/22 1328     Special Requests: NO SPECIAL REQUESTS        GRAM STAIN FEW WBCS SEEN         NO ORGANISMS SEEN        Culture result:       LIGHT PROBABLE STREPTOCOCCI, BETA HEMOLYTIC          CULTURE, WOUND Duaine Tonie STAIN [[id number]]  (Abnormal) Collected: 11/09/22 1702    Order Status: Completed Specimen: Surgical Specimen Updated: 11/10/22 1324     Special Requests: NO SPECIAL REQUESTS        GRAM STAIN NO WBC'S SEEN         NO ORGANISMS SEEN        Culture result:       LIGHT PROBABLE STREPTOCOCCI, BETA HEMOLYTIC          CULTURE, ANAEROBIC [[id number]] Collected: 11/09/22 1703    Order Status: No result Updated: 11/09/22 2357    CULTURE, ANAEROBIC [[id number]] Collected: 11/09/22 1702    Order Status: No result Updated: 11/09/22 2357            I have reviewed notes of prior 24hr. Other pertinent lab:      Total time spent with patient: 35 minutes I personally reviewed chart, notes, data and current medications in the medical record. I have personally examined and treated the patient at bedside during this period.                  Care Plan discussed with: Patient, Care Manager, Nursing Staff, and >50% of time spent in counseling and coordination of care    Discussed:  Care Plan    Prophylaxis:  Lovenox    Disposition:  Home w/Family           ___________________________________________________    Attending Physician: April Bautista MD

## 2022-11-11 NOTE — PROGRESS NOTES
[facility] Progress Note           Rey Torres DO    [phone] Office  [phone]  Fax    2022      Assessment & Plan:     Streptococcal septic right ankle. .  Status post I&D . Awaiting identification of organism. Continue vancomycin. Change Unasyn to ceftriaxone. Patient will need PICC line and 6 weeks of IV antibiotics at discharge. Thrombocytosis. Likely reactive due to above. Elevated creatinine. Resolved          Subjective:     No new complaints. Tolerating antibiotics    Objective:     Vitals: Visit Vitals  BP (!) 145/71 (BP 1 Location: Right lower arm, BP Patient Position: At rest)   Pulse 84   Temp 98.1 °F (36.7 °C)   Resp 18   Ht 5' 11\" (1.803 m)   Wt 270 lb (122.5 kg)   SpO2 95%   BMI 37.66 kg/m²       Tmax:  Temp (24hrs), Av °F (36.7 °C), Min:97.8 °F (36.6 °C), Max:98.3 °F (36.8 °C)      Exam:   Patient is intubated:      Physical Examination:   General:  Alert, cooperative, no distress   Head:  Normocephalic, atraumatic. Eyes:  Conjunctivae clear   Neck: Supple       Lungs:   No distress. Chest wall:     Heart:     Abdomen:      Extremities: Moves all. Skin: No acute rash on exposed skin   Neurologic: CNII-XII intact. Normal strength     Labs:        No lab exists for component: ITNL   No results for input(s): CPK, CKMB, TROIQ in the last 72 hours. Recent Labs     22  0223 11/10/22  0256 22  1108   NA  --  136 134*   K  --  4.2 4.1   CL  --  103 99   CO2  --  28 29   BUN  --  15 22*   CREA 1.09 1.03 1.32*   GLU  --  163* 99   ALB  --  2.5* 3.3*   WBC  --  10.9 8.6   HGB  --  10.7* 12.1   HCT  --  33.1* 37.8   PLT  --  442* 498*     No results for input(s): INR, PTP, APTT, INREXT in the last 72 hours.   Needs: urine analysis, urine sodium, protein and creatinine  No results found for: VIRA, CREAU      Cultures:     No results found for: SDES  Lab Results   Component Value Date/Time    Culture result: LIGHT PROBABLE STREPTOCOCCI, BETA HEMOLYTIC (A) 11/09/2022 05:03 PM    Culture result: LIGHT PROBABLE STREPTOCOCCI, BETA HEMOLYTIC (A) 11/09/2022 05:02 PM    Culture result: NO GROWTH 2 DAYS 11/09/2022 12:55 PM       Radiology:     Medications       Current Facility-Administered Medications   Medication Dose Route Frequency Last Admin    vancomycin (VANCOCIN) 1500 mg in  ml infusion  1,500 mg IntraVENous Q12H      HYDROcodone-acetaminophen (NORCO) 5-325 mg per tablet 1 Tablet  1 Tablet Oral Q4H PRN      aspirin delayed-release tablet 81 mg  81 mg Oral DAILY 81 mg at 11/11/22 0950    atorvastatin (LIPITOR) tablet 20 mg  20 mg Oral DAILY 20 mg at 11/11/22 0951    escitalopram oxalate (LEXAPRO) tablet 20 mg  20 mg Oral DAILY 20 mg at 11/11/22 0951    LORazepam (ATIVAN) tablet 1 mg  1 mg Oral Q6H PRN 1 mg at 11/10/22 2148    ampicillin-sulbactam (UNASYN) 3 g in 0.9% sodium chloride (MBP/ADV) 100 mL MBP  3 g IntraVENous Q6H 3 g at 11/11/22 1122    irbesartan (AVAPRO) tablet 300 mg (Patient Supplied)  300 mg Oral DAILY 300 mg at 11/11/22 1000    sodium chloride (NS) flush 5-40 mL  5-40 mL IntraVENous PRN      acetaminophen (TYLENOL) tablet 650 mg  650 mg Oral Q6H  mg at 11/11/22 0951    Or    acetaminophen (TYLENOL) suppository 650 mg  650 mg Rectal Q6H PRN      polyethylene glycol (MIRALAX) packet 17 g  17 g Oral DAILY PRN      bisacodyL (DULCOLAX) suppository 10 mg  10 mg Rectal DAILY PRN      ondansetron (ZOFRAN) injection 4 mg  4 mg IntraVENous Q6H PRN      morphine injection 4 mg  4 mg IntraVENous Q3H PRN 4 mg at 11/11/22 1121    sodium chloride (NS) flush 5-40 mL  5-40 mL IntraVENous Q8H 10 mL at 11/11/22 0605    sodium chloride (NS) flush 5-40 mL  5-40 mL IntraVENous PRN      naloxone (NARCAN) injection 0.4 mg  0.4 mg IntraVENous PRN      calcium-vitamin D (OS-PEBBLES +D3) 500 mg-200 unit per tablet 1 Tablet  1 Tablet Oral TID WITH MEALS 1 Tablet at 11/11/22 1121    senna-docusate (PERICOLACE) 8.6-50 mg per tablet 1 Tablet  1 Tablet Oral BID 1 Tablet at 11/11/22 0951    polyethylene glycol (MIRALAX) packet 17 g  17 g Oral DAILY 17 g at 11/11/22 0952    bisacodyL (DULCOLAX) suppository 10 mg  10 mg Rectal DAILY PRN      enoxaparin (LOVENOX) injection 30 mg  30 mg SubCUTAneous Q12H 30 mg at 11/11/22 7103           Case discussed with:      Cuca Byrnes, DO

## 2022-11-11 NOTE — PROGRESS NOTES
[address] Dosing Services: 11/11/2022    Consult for antibiotic dosing of Vancomycin by Dr. Yo Vaughn  Indication: Septic R ankle joint  Day of Therapy: 3    Vancomycin therapy:  Current maintenance dose: vancomycin 1000 mg IV every 12 hours   Last level: 6.3 mcg/mL AUC/ROBERT 295  Target AUC/ROBERT of 400-600  Plan: level below goal  Adjustment to therapy: 1500 mg IV every 12 hours for predicted AUC/ROBERT > 400    Pharmacy to follow daily.   Pharmacist Ovi Hernández                    [facility] [phone]

## 2022-11-12 LAB
COMMENT, HOLDF: NORMAL
SAMPLES BEING HELD,HOLD: NORMAL

## 2022-11-12 PROCEDURE — 36415 COLL VENOUS BLD VENIPUNCTURE: CPT

## 2022-11-12 PROCEDURE — 74011250636 HC RX REV CODE- 250/636: Performed by: HOSPITALIST

## 2022-11-12 PROCEDURE — 94761 N-INVAS EAR/PLS OXIMETRY MLT: CPT

## 2022-11-12 PROCEDURE — 65270000029 HC RM PRIVATE

## 2022-11-12 PROCEDURE — 74011000250 HC RX REV CODE- 250: Performed by: ORTHOPAEDIC SURGERY

## 2022-11-12 PROCEDURE — 74011250637 HC RX REV CODE- 250/637: Performed by: NURSE PRACTITIONER

## 2022-11-12 PROCEDURE — 74011250636 HC RX REV CODE- 250/636: Performed by: INTERNAL MEDICINE

## 2022-11-12 PROCEDURE — 74011250637 HC RX REV CODE- 250/637: Performed by: INTERNAL MEDICINE

## 2022-11-12 PROCEDURE — 74011250637 HC RX REV CODE- 250/637: Performed by: ORTHOPAEDIC SURGERY

## 2022-11-12 PROCEDURE — 74011000250 HC RX REV CODE- 250: Performed by: INTERNAL MEDICINE

## 2022-11-12 PROCEDURE — 74011250636 HC RX REV CODE- 250/636: Performed by: ORTHOPAEDIC SURGERY

## 2022-11-12 RX ORDER — HYDRALAZINE HYDROCHLORIDE 20 MG/ML
10 INJECTION INTRAMUSCULAR; INTRAVENOUS
Status: DISCONTINUED | OUTPATIENT
Start: 2022-11-12 | End: 2022-11-14 | Stop reason: HOSPADM

## 2022-11-12 RX ORDER — ZOLPIDEM TARTRATE 5 MG/1
5 TABLET ORAL
Status: DISCONTINUED | OUTPATIENT
Start: 2022-11-12 | End: 2022-11-14 | Stop reason: HOSPADM

## 2022-11-12 RX ADMIN — HYDROCODONE BITARTRATE AND ACETAMINOPHEN 1 TABLET: 5; 325 TABLET ORAL at 17:11

## 2022-11-12 RX ADMIN — SODIUM CHLORIDE, PRESERVATIVE FREE 10 ML: 5 INJECTION INTRAVENOUS at 06:20

## 2022-11-12 RX ADMIN — HYDROCODONE BITARTRATE AND ACETAMINOPHEN 1 TABLET: 5; 325 TABLET ORAL at 09:18

## 2022-11-12 RX ADMIN — SODIUM CHLORIDE, PRESERVATIVE FREE 10 ML: 5 INJECTION INTRAVENOUS at 21:45

## 2022-11-12 RX ADMIN — ASPIRIN 81 MG: 81 TABLET, COATED ORAL at 08:14

## 2022-11-12 RX ADMIN — SODIUM CHLORIDE, PRESERVATIVE FREE 2 G: 5 INJECTION INTRAVENOUS at 13:19

## 2022-11-12 RX ADMIN — POLYETHYLENE GLYCOL 3350 17 G: 17 POWDER, FOR SOLUTION ORAL at 08:14

## 2022-11-12 RX ADMIN — LORAZEPAM 1 MG: 1 TABLET ORAL at 21:45

## 2022-11-12 RX ADMIN — VANCOMYCIN HYDROCHLORIDE 1500 MG: 10 INJECTION, POWDER, LYOPHILIZED, FOR SOLUTION INTRAVENOUS at 00:23

## 2022-11-12 RX ADMIN — SENNOSIDES AND DOCUSATE SODIUM 1 TABLET: 50; 8.6 TABLET ORAL at 08:15

## 2022-11-12 RX ADMIN — ENOXAPARIN SODIUM 30 MG: 100 INJECTION SUBCUTANEOUS at 08:14

## 2022-11-12 RX ADMIN — ATORVASTATIN CALCIUM 20 MG: 20 TABLET, FILM COATED ORAL at 08:15

## 2022-11-12 RX ADMIN — Medication 1 TABLET: at 17:11

## 2022-11-12 RX ADMIN — Medication 1 TABLET: at 08:14

## 2022-11-12 RX ADMIN — Medication 1 TABLET: at 13:19

## 2022-11-12 RX ADMIN — VANCOMYCIN HYDROCHLORIDE 1500 MG: 10 INJECTION, POWDER, LYOPHILIZED, FOR SOLUTION INTRAVENOUS at 13:19

## 2022-11-12 RX ADMIN — HYDROCODONE BITARTRATE AND ACETAMINOPHEN 1 TABLET: 5; 325 TABLET ORAL at 13:19

## 2022-11-12 RX ADMIN — SODIUM CHLORIDE, PRESERVATIVE FREE 10 ML: 5 INJECTION INTRAVENOUS at 13:20

## 2022-11-12 RX ADMIN — SENNOSIDES AND DOCUSATE SODIUM 1 TABLET: 50; 8.6 TABLET ORAL at 17:11

## 2022-11-12 RX ADMIN — ENOXAPARIN SODIUM 30 MG: 100 INJECTION SUBCUTANEOUS at 21:45

## 2022-11-12 RX ADMIN — ESCITALOPRAM OXALATE 20 MG: 10 TABLET ORAL at 08:14

## 2022-11-12 RX ADMIN — HYDROCODONE BITARTRATE AND ACETAMINOPHEN 1 TABLET: 5; 325 TABLET ORAL at 04:25

## 2022-11-12 RX ADMIN — HYDROCODONE BITARTRATE AND ACETAMINOPHEN 1 TABLET: 5; 325 TABLET ORAL at 21:45

## 2022-11-12 RX ADMIN — IRBESARTAN 300 MG: 300 TABLET ORAL at 08:14

## 2022-11-12 NOTE — PROGRESS NOTES
Problem: Falls - Risk of  Goal: *Absence of Falls  Description: Document Jeana Nap Fall Risk and appropriate interventions in the flowsheet.   Outcome: Progressing Towards Goal  Note: Fall Risk Interventions:  Mobility Interventions: Bed/chair exit alarm, OT consult for ADLs, Patient to call before getting OOB, PT Consult for mobility concerns, PT Consult for assist device competence, Utilize walker, cane, or other assistive device, Utilize gait belt for transfers/ambulation         Medication Interventions: Bed/chair exit alarm, Evaluate medications/consider consulting pharmacy, Teach patient to arise slowly, Patient to call before getting OOB    Elimination Interventions: Bed/chair exit alarm, Call light in reach, Patient to call for help with toileting needs, Toileting schedule/hourly rounds, Urinal in reach              Problem: General Infection Care Plan (Adult and Pediatric)  Goal: Improvement in signs and symptoms of infection  Outcome: Progressing Towards Goal  Goal: *Optimize nutritional status  Outcome: Progressing Towards Goal     Problem: Surgical Pathway Post-Op Day 1  Goal: Activity/Safety  Outcome: Progressing Towards Goal  Goal: Diagnostic Test/Procedures  Outcome: Progressing Towards Goal  Goal: Nutrition/Diet  Outcome: Progressing Towards Goal  Goal: Discharge Planning  Outcome: Progressing Towards Goal  Goal: Medications  Outcome: Progressing Towards Goal  Goal: Respiratory  Outcome: Progressing Towards Goal  Goal: Treatments/Interventions/Procedures  Outcome: Progressing Towards Goal  Goal: Psychosocial  Outcome: Progressing Towards Goal  Goal: *No signs and symptoms of infection or wound complications  Outcome: Progressing Towards Goal  Goal: *Optimal pain control at patient's stated goal  Outcome: Progressing Towards Goal  Goal: *Adequate urinary output (equal to or greater than 30 milliliters/hour)  Outcome: Progressing Towards Goal  Goal: *Hemodynamically stable  Outcome: Progressing Towards Goal  Goal: *Tolerating diet  Outcome: Progressing Towards Goal  Goal: *Demonstrates progressive activity  Outcome: Progressing Towards Goal  Goal: *Lungs clear or at baseline  Outcome: Progressing Towards Goal

## 2022-11-12 NOTE — PROGRESS NOTES
Oren Prabhakar [facility]  [address]  [phone]      Hospitalist Progress Note      NAME: Silvestre Ibanez   :  1970  MRM:  [id number]    Date of service: 2022  10:06 AM       Assessment and Plan:   R ankle septic joint: s/p I&D . Ortho and ID following. Cont Vanc, CTx. cx shows: strep beta hemolytic. Will need PICC and IV abx at dc     2. HTN: Cont home Avapro     3. Anxiety: Cont home lexapro    4. Obesity. Would benefit from wt loss              Subjective:     Chief Complaint[de-identified] Patient was seen and examined as a follow up for septic joint. Chart was reviewed. feels better     ROS:  (bold if positive, if negative)    Tolerating PT  Tolerating Diet        Objective:     Last 24hrs VS reviewed since prior progress note.  Most recent are:    Visit Vitals  BP (!) 154/98 (BP 1 Location: Right lower arm, BP Patient Position: At rest)   Pulse 74   Temp 98 °F (36.7 °C)   Resp 18   Ht 5' 11\" (1.803 m)   Wt 122.5 kg (270 lb)   SpO2 96%   BMI 37.66 kg/m²     SpO2 Readings from Last 6 Encounters:   22 96%   21 97%   20 99%   20 95%   19 98%   11/15/19 97%    O2 Flow Rate (L/min): 2 l/min     Intake/Output Summary (Last 24 hours) at 2022 1018  Last data filed at 2022 0645  Gross per 24 hour   Intake 1140 ml   Output 600 ml   Net 540 ml          Physical Exam:    Gen:  obese, in no acute distress  HEENT:  Pink conjunctivae, PERRL, hearing intact to voice, moist mucous membranes  Neck:  Supple, without masses, thyroid non-tender  Resp:  No accessory muscle use, clear breath sounds without wheezes rales or rhonchi  Card:  No murmurs, normal S1, S2 without thrills, bruits or peripheral edema  Abd:  Soft, non-tender, non-distended, normoactive bowel sounds are present, no palpable organomegaly and no detectable hernias  Lymph:  No cervical or inguinal adenopathy  Musc:  No cyanosis or clubbing  Skin:  No rashes or ulcers, skin turgor is good  Neuro:  Cranial nerves are grossly intact, no focal motor weakness, follows commands appropriately  Psych:  Good insight, oriented to person, place and time, alert  __________________________________________________________________  Medications Reviewed: (see below)  Medications:     Current Facility-Administered Medications   Medication Dose Route Frequency    vancomycin (VANCOCIN) 1500 mg in  ml infusion  1,500 mg IntraVENous Q12H    HYDROcodone-acetaminophen (NORCO) 5-325 mg per tablet 1 Tablet  1 Tablet Oral Q4H PRN    cefTRIAXone (ROCEPHIN) 2 g in 0.9% sodium chloride 20 mL IV syringe  2 g IntraVENous Q24H    aspirin delayed-release tablet 81 mg  81 mg Oral DAILY    atorvastatin (LIPITOR) tablet 20 mg  20 mg Oral DAILY    escitalopram oxalate (LEXAPRO) tablet 20 mg  20 mg Oral DAILY    LORazepam (ATIVAN) tablet 1 mg  1 mg Oral Q6H PRN    irbesartan (AVAPRO) tablet 300 mg (Patient Supplied)  300 mg Oral DAILY    sodium chloride (NS) flush 5-40 mL  5-40 mL IntraVENous PRN    acetaminophen (TYLENOL) tablet 650 mg  650 mg Oral Q6H PRN    Or    acetaminophen (TYLENOL) suppository 650 mg  650 mg Rectal Q6H PRN    polyethylene glycol (MIRALAX) packet 17 g  17 g Oral DAILY PRN    bisacodyL (DULCOLAX) suppository 10 mg  10 mg Rectal DAILY PRN    ondansetron (ZOFRAN) injection 4 mg  4 mg IntraVENous Q6H PRN    morphine injection 4 mg  4 mg IntraVENous Q3H PRN    sodium chloride (NS) flush 5-40 mL  5-40 mL IntraVENous Q8H    sodium chloride (NS) flush 5-40 mL  5-40 mL IntraVENous PRN    naloxone (NARCAN) injection 0.4 mg  0.4 mg IntraVENous PRN    calcium-vitamin D (OS-PEBBLES +D3) 500 mg-200 unit per tablet 1 Tablet  1 Tablet Oral TID WITH MEALS    senna-docusate (PERICOLACE) 8.6-50 mg per tablet 1 Tablet  1 Tablet Oral BID    polyethylene glycol (MIRALAX) packet 17 g  17 g Oral DAILY    bisacodyL (DULCOLAX) suppository 10 mg  10 mg Rectal DAILY PRN    enoxaparin (LOVENOX) injection 30 mg  30 mg SubCUTAneous Q12H        Lab Data Reviewed: (see below)  Lab Review:     Recent Labs     11/10/22  0256 11/09/22  1108   WBC 10.9 8.6   HGB 10.7* 12.1   HCT 33.1* 37.8   * 498*       Recent Labs     11/11/22  0223 11/10/22  0256 11/09/22  1108   NA  --  136 134*   K  --  4.2 4.1   CL  --  103 99   CO2  --  28 29   GLU  --  163* 99   BUN  --  15 22*   CREA 1.09 1.03 1.32*   CA  --  8.8 10.1   ALB  --  2.5* 3.3*   TBILI  --  0.4 0.6   ALT  --  34 49       Lab Results   Component Value Date/Time    Glucose (POC) 106 (H) 03/28/2019 08:35 AM     No results for input(s): PH, PCO2, PO2, HCO3, FIO2 in the last 72 hours. No results for input(s): INR, INREXT, INREXT in the last 72 hours. All Micro Results       Procedure Component Value Units Date/Time    CULTURE, BLOOD, PAIRED [[id number]] Collected: 11/09/22 1255    Order Status: Completed Specimen: Blood Updated: 11/12/22 0505     Special Requests: NO SPECIAL REQUESTS        Culture result: NO GROWTH 3 DAYS       CULTURE, ANAEROBIC [[id number]] Collected: 11/09/22 1703    Order Status: Completed Specimen: Surgical Specimen Updated: 11/11/22 1415     Special Requests: NO SPECIAL REQUESTS        Culture result: NO ANAEROBES ISOLATED       CULTURE, WOUND [place] Jamey STAIN [[id number]]  (Abnormal) Collected: 11/09/22 1703    Order Status: Completed Specimen: Surgical Specimen Updated: 11/11/22 1414     Special Requests: NO SPECIAL REQUESTS        GRAM STAIN FEW WBCS SEEN         NO ORGANISMS SEEN        Culture result:       LIGHT STREPTOCOCCI, BETA HEMOLYTIC GROUP G Penicillin and ampicillin are drugs of choice for treatment of beta-hemolytic streptococcal infections. Susceptibility testing of penicillins and beta-lactams approved by the FDA for treatment of beta-hemolytic streptococcal infections need not be performed routinely, because nonsusceptible isolates are extremely rare.  CLSI 2012      CULTURE, Marganjeln Him STAIN [[id number]]  (Abnormal) Collected: 11/09/22 1702 Order Status: Completed Specimen: Surgical Specimen Updated: 11/11/22 1409     Special Requests: NO SPECIAL REQUESTS        GRAM STAIN NO WBC'S SEEN         NO ORGANISMS SEEN        Culture result:       LIGHT STREPTOCOCCI, BETA HEMOLYTIC GROUP G Penicillin and ampicillin are drugs of choice for treatment of beta-hemolytic streptococcal infections. Susceptibility testing of penicillins and beta-lactams approved by the FDA for treatment of beta-hemolytic streptococcal infections need not be performed routinely, because nonsusceptible isolates are extremely rare. CLSI 2012              LIGHT MIXED SKIN BRIEN ISOLATED          CULTURE, ANAEROBIC [[id number]] Collected: 11/09/22 1702    Order Status: Completed Specimen: Surgical Specimen Updated: 11/11/22 1402     Special Requests: NO SPECIAL REQUESTS        Culture result: NO ANAEROBES ISOLATED               I have reviewed notes of prior 24hr. Other pertinent lab: Total time spent with patient: 35 minutes I personally reviewed chart, notes, data and current medications in the medical record. I have personally examined and treated the patient at bedside during this period.                  Care Plan discussed with: Patient, Care Manager, Nursing Staff, and >50% of time spent in counseling and coordination of care    Discussed:  Care Plan    Prophylaxis:  Lovenox    Disposition:  Home w/Family           ___________________________________________________    Attending Physician: Rosendo Juarez MD

## 2022-11-12 NOTE — PROGRESS NOTES
Problem: General Infection Care Plan (Adult and Pediatric)  Goal: Improvement in signs and symptoms of infection  Outcome: Progressing Towards Goal  Goal: *Optimize nutritional status  Outcome: Progressing Towards Goal     Problem: General Infection Care Plan (Adult and Pediatric)  Goal: Improvement in signs and symptoms of infection  Outcome: Progressing Towards Goal  Goal: *Optimize nutritional status  Outcome: Progressing Towards Goal     Problem: Patient Education: Go to Patient Education Activity  Goal: Patient/Family Education  Outcome: Progressing Towards Goal     Problem: Surgical Pathway Post-Op Day 1  Goal: Activity/Safety  Outcome: Progressing Towards Goal  Goal: Diagnostic Test/Procedures  Outcome: Progressing Towards Goal  Goal: Nutrition/Diet  Outcome: Progressing Towards Goal  Goal: Discharge Planning  Outcome: Progressing Towards Goal  Goal: Medications  Outcome: Progressing Towards Goal  Goal: Respiratory  Outcome: Progressing Towards Goal  Goal: Treatments/Interventions/Procedures  Outcome: Progressing Towards Goal  Goal: Psychosocial  Outcome: Progressing Towards Goal  Goal: *No signs and symptoms of infection or wound complications  Outcome: Progressing Towards Goal  Goal: *Optimal pain control at patient's stated goal  Outcome: Progressing Towards Goal  Goal: *Adequate urinary output (equal to or greater than 30 milliliters/hour)  Outcome: Progressing Towards Goal  Goal: *Hemodynamically stable  Outcome: Progressing Towards Goal  Goal: *Tolerating diet  Outcome: Progressing Towards Goal  Goal: *Demonstrates progressive activity  Outcome: Progressing Towards Goal  Goal: *Lungs clear or at baseline  Outcome: Progressing Towards Goal     Problem: Patient Education: Go to Patient Education Activity  Goal: Patient/Family Education  Outcome: Progressing Towards Goal

## 2022-11-12 NOTE — PROGRESS NOTES
1830: Dressing changed according to orders. Bedside shift change report given to Fabienne (oncoming nurse) by Tiffany Richard (offgoing nurse). Report included the following information SBAR, Kardex, Procedure Summary, Intake/Output, MAR, and Recent Results.

## 2022-11-12 NOTE — PROGRESS NOTES
ASSESSMENT     Melissa Montano is a 50 y.o. male, who is POD# 3 s/p right ankle septic joint I&D. Patient doing well. PLAN    1. Pain control. 2. Mobilize with PT / OT. Weight bearing Status : WBAT RLE with boot  3. DVT Prophylaxis : Mechanical / Lovenox  4. ID Consult : Awaiting finalized intra-operative cultures. Currently on vancomycin and ceftriaxone. Appreciate further recommendations. 5. Disposition : Case Management for planning. Likely discharge to home with [facility] on Monday with PICC line and 6 weeks IV antibiotics. Nissa Su PA-C  Office : [phone]  Cell: [phone]    Subjective:     Resting, complaining of appropriate pain. States that pain has been well-controlled. He has been able to get up with the walker with therapy and to use the bathroom. Reports that they just changed his dressing 1 hour ago. Expresses some concern for high blood pressure here, though asymptomatic. Denies CP, SOB, cough, N/V, fever/chills. Objective:   Patient Vitals for the past 12 hrs:   BP Temp Pulse Resp SpO2   11/12/22 1114 (!) 152/80 98.2 °F (36.8 °C) 84 18 93 %   11/12/22 0755 (!) 154/98 98 °F (36.7 °C) 74 18 96 %   11/12/22 0420 (!) 151/97 97.9 °F (36.6 °C) 71 18 95 %       GENERAL: No acute distress. MUSCULOSKELETAL EXAM  Right lower extremity -   Dressing clean, dry, and intact. Boot in place. Calf and thigh soft. Skin is warm and well perfused.     Sensation grossly in tact to light touch.    + dorsiflexion, plantarflexion, EHL    LABS :  Recent Labs     11/11/22  0223 11/10/22  0256   HGB  --  10.7*   HCT  --  33.1*   NA  --  136   K  --  4.2   CL  --  103   CO2  --  28   BUN  --  15   CREA 1.09 1.03   GLU  --  163*   WBC  --  10.9       No results found for: SDES    Lab Results   Component Value Date/Time    Culture result: NO ANAEROBES ISOLATED 11/09/2022 05:03 PM    Culture result: (A) 11/09/2022 05:03 PM     LIGHT STREPTOCOCCI, BETA HEMOLYTIC GROUP G Penicillin and ampicillin are drugs of choice for treatment of beta-hemolytic streptococcal infections. Susceptibility testing of penicillins and beta-lactams approved by the FDA for treatment of beta-hemolytic streptococcal infections need not be performed routinely, because nonsusceptible isolates are extremely rare. CLSI 2012      Culture result: NO ANAEROBES ISOLATED 11/09/2022 05:02 PM    Culture result: (A) 11/09/2022 05:02 PM     LIGHT STREPTOCOCCI, BETA HEMOLYTIC GROUP G Penicillin and ampicillin are drugs of choice for treatment of beta-hemolytic streptococcal infections. Susceptibility testing of penicillins and beta-lactams approved by the FDA for treatment of beta-hemolytic streptococcal infections need not be performed routinely, because nonsusceptible isolates are extremely rare.  CLSI 2012      Culture result: LIGHT MIXED SKIN BRIEN ISOLATED 11/09/2022 05:02 PM

## 2022-11-12 NOTE — PROGRESS NOTES
1228: Perfectserved Dr. Ariel Trinidad that patient's BP at  was 152/80. Gave patient is scheduled 300mg Avapro, patient rates pain 3/10 and was given Norco at 0908. No new orders    Bedside shift change report given to Emilee (oncoming nurse) by Clyde Snow (offgoing nurse). Report included the following information SBAR, Kardex, Procedure Summary, Intake/Output, MAR, and Recent Results.

## 2022-11-13 ENCOUNTER — APPOINTMENT (OUTPATIENT)
Dept: NON INVASIVE DIAGNOSTICS | Age: 52
DRG: 494 | End: 2022-11-13
Attending: INTERNAL MEDICINE
Payer: COMMERCIAL

## 2022-11-13 LAB
CREAT SERPL-MCNC: 1.12 MG/DL (ref 0.7–1.3)
DATE LAST DOSE: NORMAL
ECHO AO ASC DIAM: 3 CM
ECHO AO ASCENDING AORTA INDEX: 1.24 CM/M2
ECHO AV AREA PEAK VELOCITY: 2.1 CM2
ECHO AV AREA VTI: 2.3 CM2
ECHO AV AREA/BSA PEAK VELOCITY: 0.9 CM2/M2
ECHO AV AREA/BSA VTI: 1 CM2/M2
ECHO AV MEAN GRADIENT: 4 MMHG
ECHO AV MEAN VELOCITY: 0.9 M/S
ECHO AV PEAK GRADIENT: 8 MMHG
ECHO AV PEAK VELOCITY: 1.4 M/S
ECHO AV VELOCITY RATIO: 0.71
ECHO AV VTI: 25.7 CM
ECHO LA DIAMETER INDEX: 1.58 CM/M2
ECHO LA DIAMETER: 3.8 CM
ECHO LA VOL 2C: 38 ML (ref 18–58)
ECHO LA VOL 4C: 35 ML (ref 18–58)
ECHO LA VOL BP: 37 ML (ref 18–58)
ECHO LA VOL/BSA BIPLANE: 15 ML/M2 (ref 16–34)
ECHO LA VOLUME AREA LENGTH: 41 ML
ECHO LA VOLUME INDEX A2C: 16 ML/M2 (ref 16–34)
ECHO LA VOLUME INDEX A4C: 15 ML/M2 (ref 16–34)
ECHO LA VOLUME INDEX AREA LENGTH: 17 ML/M2 (ref 16–34)
ECHO LV E' LATERAL VELOCITY: 13 CM/S
ECHO LV E' SEPTAL VELOCITY: 9 CM/S
ECHO LV FRACTIONAL SHORTENING: 31 % (ref 28–44)
ECHO LV INTERNAL DIMENSION DIASTOLE INDEX: 2.28 CM/M2
ECHO LV INTERNAL DIMENSION DIASTOLIC: 5.5 CM (ref 4.2–5.9)
ECHO LV INTERNAL DIMENSION SYSTOLIC INDEX: 1.58 CM/M2
ECHO LV INTERNAL DIMENSION SYSTOLIC: 3.8 CM
ECHO LV IVSD: 1 CM (ref 0.6–1)
ECHO LV MASS 2D: 227.4 G (ref 88–224)
ECHO LV MASS INDEX 2D: 94.4 G/M2 (ref 49–115)
ECHO LV POSTERIOR WALL DIASTOLIC: 1.1 CM (ref 0.6–1)
ECHO LV RELATIVE WALL THICKNESS RATIO: 0.4
ECHO LVOT AREA: 3.1 CM2
ECHO LVOT AV VTI INDEX: 0.75
ECHO LVOT DIAM: 2 CM
ECHO LVOT MEAN GRADIENT: 2 MMHG
ECHO LVOT PEAK GRADIENT: 4 MMHG
ECHO LVOT PEAK VELOCITY: 1 M/S
ECHO LVOT STROKE VOLUME INDEX: 25.3 ML/M2
ECHO LVOT SV: 60.9 ML
ECHO LVOT VTI: 19.4 CM
ECHO MV A VELOCITY: 0.94 M/S
ECHO MV E DECELERATION TIME (DT): 227.7 MS
ECHO MV E VELOCITY: 1.02 M/S
ECHO MV E/A RATIO: 1.09
ECHO MV E/E' LATERAL: 7.85
ECHO MV E/E' RATIO (AVERAGED): 9.59
ECHO MV E/E' SEPTAL: 11.33
ECHO PV MAX VELOCITY: 1.1 M/S
ECHO PV PEAK GRADIENT: 5 MMHG
ECHO RV INTERNAL DIMENSION: 3.4 CM
ECHO RV TAPSE: 2.4 CM (ref 1.7–?)
ECHO RVOT PEAK GRADIENT: 2 MMHG
ECHO RVOT PEAK VELOCITY: 0.8 M/S
ECHO TV REGURGITANT MAX VELOCITY: 2.25 M/S
ECHO TV REGURGITANT PEAK GRADIENT: 21 MMHG
REPORTED DOSE,DOSE: NORMAL UNITS
REPORTED DOSE/TIME,TMG: 1300
VANCOMYCIN TROUGH SERPL-MCNC: 9.5 UG/ML (ref 5–10)

## 2022-11-13 PROCEDURE — 36415 COLL VENOUS BLD VENIPUNCTURE: CPT

## 2022-11-13 PROCEDURE — 93306 TTE W/DOPPLER COMPLETE: CPT | Performed by: INTERNAL MEDICINE

## 2022-11-13 PROCEDURE — 74011000250 HC RX REV CODE- 250: Performed by: ORTHOPAEDIC SURGERY

## 2022-11-13 PROCEDURE — 74011250637 HC RX REV CODE- 250/637: Performed by: INTERNAL MEDICINE

## 2022-11-13 PROCEDURE — 36573 INSJ PICC RS&I 5 YR+: CPT | Performed by: INTERNAL MEDICINE

## 2022-11-13 PROCEDURE — 93306 TTE W/DOPPLER COMPLETE: CPT

## 2022-11-13 PROCEDURE — 74011250636 HC RX REV CODE- 250/636: Performed by: HOSPITALIST

## 2022-11-13 PROCEDURE — 74011250637 HC RX REV CODE- 250/637: Performed by: NURSE PRACTITIONER

## 2022-11-13 PROCEDURE — 74011250637 HC RX REV CODE- 250/637: Performed by: HOSPITALIST

## 2022-11-13 PROCEDURE — 74011000250 HC RX REV CODE- 250: Performed by: INTERNAL MEDICINE

## 2022-11-13 PROCEDURE — 74011250637 HC RX REV CODE- 250/637: Performed by: ORTHOPAEDIC SURGERY

## 2022-11-13 PROCEDURE — 99232 SBSQ HOSP IP/OBS MODERATE 35: CPT | Performed by: INTERNAL MEDICINE

## 2022-11-13 PROCEDURE — 65270000029 HC RM PRIVATE

## 2022-11-13 PROCEDURE — 82565 ASSAY OF CREATININE: CPT

## 2022-11-13 PROCEDURE — 74011250636 HC RX REV CODE- 250/636: Performed by: INTERNAL MEDICINE

## 2022-11-13 PROCEDURE — 74011250636 HC RX REV CODE- 250/636: Performed by: ORTHOPAEDIC SURGERY

## 2022-11-13 PROCEDURE — 80202 ASSAY OF VANCOMYCIN: CPT

## 2022-11-13 RX ADMIN — SODIUM CHLORIDE, PRESERVATIVE FREE 10 ML: 5 INJECTION INTRAVENOUS at 12:19

## 2022-11-13 RX ADMIN — Medication 1 TABLET: at 08:52

## 2022-11-13 RX ADMIN — HYDROCODONE BITARTRATE AND ACETAMINOPHEN 1 TABLET: 5; 325 TABLET ORAL at 16:20

## 2022-11-13 RX ADMIN — VANCOMYCIN HYDROCHLORIDE 1500 MG: 10 INJECTION, POWDER, LYOPHILIZED, FOR SOLUTION INTRAVENOUS at 03:26

## 2022-11-13 RX ADMIN — SENNOSIDES AND DOCUSATE SODIUM 1 TABLET: 50; 8.6 TABLET ORAL at 08:52

## 2022-11-13 RX ADMIN — SODIUM CHLORIDE, PRESERVATIVE FREE 10 ML: 5 INJECTION INTRAVENOUS at 05:39

## 2022-11-13 RX ADMIN — ENOXAPARIN SODIUM 30 MG: 100 INJECTION SUBCUTANEOUS at 08:52

## 2022-11-13 RX ADMIN — ESCITALOPRAM OXALATE 20 MG: 10 TABLET ORAL at 08:52

## 2022-11-13 RX ADMIN — HYDROCODONE BITARTRATE AND ACETAMINOPHEN 1 TABLET: 5; 325 TABLET ORAL at 05:38

## 2022-11-13 RX ADMIN — IRBESARTAN 300 MG: 300 TABLET ORAL at 08:52

## 2022-11-13 RX ADMIN — POLYETHYLENE GLYCOL 3350 17 G: 17 POWDER, FOR SOLUTION ORAL at 08:52

## 2022-11-13 RX ADMIN — SODIUM CHLORIDE, PRESERVATIVE FREE 10 ML: 5 INJECTION INTRAVENOUS at 21:41

## 2022-11-13 RX ADMIN — HYDROCODONE BITARTRATE AND ACETAMINOPHEN 1 TABLET: 5; 325 TABLET ORAL at 20:27

## 2022-11-13 RX ADMIN — Medication 1 TABLET: at 12:19

## 2022-11-13 RX ADMIN — ATORVASTATIN CALCIUM 20 MG: 20 TABLET, FILM COATED ORAL at 08:52

## 2022-11-13 RX ADMIN — ZOLPIDEM TARTRATE 5 MG: 5 TABLET ORAL at 21:41

## 2022-11-13 RX ADMIN — SODIUM CHLORIDE, PRESERVATIVE FREE 2 G: 5 INJECTION INTRAVENOUS at 12:19

## 2022-11-13 RX ADMIN — ZOLPIDEM TARTRATE 5 MG: 5 TABLET ORAL at 00:12

## 2022-11-13 RX ADMIN — HYDROCODONE BITARTRATE AND ACETAMINOPHEN 1 TABLET: 5; 325 TABLET ORAL at 10:36

## 2022-11-13 RX ADMIN — Medication 1 TABLET: at 16:21

## 2022-11-13 RX ADMIN — ASPIRIN 81 MG: 81 TABLET, COATED ORAL at 08:52

## 2022-11-13 NOTE — PROGRESS NOTES
Problem: Falls - Risk of  Goal: *Absence of Falls  Description: Document Clari Fall Risk and appropriate interventions in the flowsheet.   Outcome: Progressing Towards Goal  Note: Fall Risk Interventions:  Mobility Interventions: OT consult for ADLs, Patient to call before getting OOB, PT Consult for mobility concerns, PT Consult for assist device competence, Utilize walker, cane, or other assistive device         Medication Interventions: Evaluate medications/consider consulting pharmacy, Patient to call before getting OOB, Teach patient to arise slowly    Elimination Interventions: Call light in reach, Patient to call for help with toileting needs, Toileting schedule/hourly rounds, Urinal in reach              Problem: Surgical Pathway Post-Op Day 1  Goal: Activity/Safety  Outcome: Progressing Towards Goal  Goal: Diagnostic Test/Procedures  Outcome: Progressing Towards Goal  Goal: Nutrition/Diet  Outcome: Progressing Towards Goal  Goal: Discharge Planning  Outcome: Progressing Towards Goal  Goal: Medications  Outcome: Progressing Towards Goal  Goal: Respiratory  Outcome: Progressing Towards Goal  Goal: Treatments/Interventions/Procedures  Outcome: Progressing Towards Goal  Goal: Psychosocial  Outcome: Progressing Towards Goal  Goal: *No signs and symptoms of infection or wound complications  Outcome: Progressing Towards Goal  Goal: *Optimal pain control at patient's stated goal  Outcome: Progressing Towards Goal  Goal: *Adequate urinary output (equal to or greater than 30 milliliters/hour)  Outcome: Progressing Towards Goal  Goal: *Hemodynamically stable  Outcome: Progressing Towards Goal  Goal: *Tolerating diet  Outcome: Progressing Towards Goal  Goal: *Demonstrates progressive activity  Outcome: Progressing Towards Goal  Goal: *Lungs clear or at baseline  Outcome: Progressing Towards Goal

## 2022-11-13 NOTE — PROGRESS NOTES
Oren Prabhakar [facility] [address]  [address]  [phone]      Hospitalist Progress Note      NAME: Sudheer Ibanez   :  1970  MRM:  [id number]    Date of service: 2022  10:06 AM       Assessment and Plan:   R ankle septic joint: s/p I&D . Ortho and ID following. Cont Vanc, CTx. cx shows: strep beta hemolytic. Will need PICC and IV abx at dc     2. HTN: Cont home Avapro     3. Anxiety: Cont home lexapro    4. Obesity. Would benefit from wt loss              Subjective:     Chief Complaint[de-identified] Patient was seen and examined as a follow up for septic joint. Chart was reviewed. feels better     ROS:  (bold if positive, if negative)    Tolerating PT  Tolerating Diet        Objective:     Last 24hrs VS reviewed since prior progress note.  Most recent are:    Visit Vitals  BP (!) 147/90 (BP 1 Location: Right lower arm, BP Patient Position: At rest)   Pulse 73   Temp 98.3 °F (36.8 °C)   Resp 16   Ht 5' 11\" (1.803 m)   Wt 124.2 kg (273 lb 12.8 oz)   SpO2 97%   BMI 38.19 kg/m²     SpO2 Readings from Last 6 Encounters:   22 97%   21 97%   20 99%   20 95%   19 98%   11/15/19 97%    O2 Flow Rate (L/min): 2 l/min     Intake/Output Summary (Last 24 hours) at 2022 1024  Last data filed at 2022 0758  Gross per 24 hour   Intake 1700 ml   Output 0 ml   Net 1700 ml          Physical Exam:    Gen:  obese, in no acute distress  HEENT:  Pink conjunctivae, PERRL, hearing intact to voice, moist mucous membranes  Neck:  Supple, without masses, thyroid non-tender  Resp:  No accessory muscle use, clear breath sounds without wheezes rales or rhonchi  Card:  No murmurs, normal S1, S2 without thrills, bruits or peripheral edema  Abd:  Soft, non-tender, non-distended, normoactive bowel sounds are present, no palpable organomegaly and no detectable hernias  Lymph:  No cervical or inguinal adenopathy  Musc:  No cyanosis or clubbing  Skin:  No rashes or ulcers, skin turgor is good  Neuro:  Cranial nerves are grossly intact, no focal motor weakness, follows commands appropriately  Psych:  Good insight, oriented to person, place and time, alert  __________________________________________________________________  Medications Reviewed: (see below)  Medications:     Current Facility-Administered Medications   Medication Dose Route Frequency    zolpidem (AMBIEN) tablet 5 mg  5 mg Oral QHS PRN    hydrALAZINE (APRESOLINE) 20 mg/mL injection 10 mg  10 mg IntraVENous Q4H PRN    HYDROcodone-acetaminophen (NORCO) 5-325 mg per tablet 1 Tablet  1 Tablet Oral Q4H PRN    cefTRIAXone (ROCEPHIN) 2 g in 0.9% sodium chloride 20 mL IV syringe  2 g IntraVENous Q24H    aspirin delayed-release tablet 81 mg  81 mg Oral DAILY    atorvastatin (LIPITOR) tablet 20 mg  20 mg Oral DAILY    escitalopram oxalate (LEXAPRO) tablet 20 mg  20 mg Oral DAILY    LORazepam (ATIVAN) tablet 1 mg  1 mg Oral Q6H PRN    irbesartan (AVAPRO) tablet 300 mg (Patient Supplied)  300 mg Oral DAILY    sodium chloride (NS) flush 5-40 mL  5-40 mL IntraVENous PRN    acetaminophen (TYLENOL) tablet 650 mg  650 mg Oral Q6H PRN    Or    acetaminophen (TYLENOL) suppository 650 mg  650 mg Rectal Q6H PRN    polyethylene glycol (MIRALAX) packet 17 g  17 g Oral DAILY PRN    bisacodyL (DULCOLAX) suppository 10 mg  10 mg Rectal DAILY PRN    ondansetron (ZOFRAN) injection 4 mg  4 mg IntraVENous Q6H PRN    morphine injection 4 mg  4 mg IntraVENous Q3H PRN    sodium chloride (NS) flush 5-40 mL  5-40 mL IntraVENous Q8H    sodium chloride (NS) flush 5-40 mL  5-40 mL IntraVENous PRN    naloxone (NARCAN) injection 0.4 mg  0.4 mg IntraVENous PRN    calcium-vitamin D (OS-PEBBLES +D3) 500 mg-200 unit per tablet 1 Tablet  1 Tablet Oral TID WITH MEALS    senna-docusate (PERICOLACE) 8.6-50 mg per tablet 1 Tablet  1 Tablet Oral BID    polyethylene glycol (MIRALAX) packet 17 g  17 g Oral DAILY    bisacodyL (DULCOLAX) suppository 10 mg  10 mg Rectal DAILY PRN    enoxaparin (LOVENOX) injection 30 mg  30 mg SubCUTAneous Q12H        Lab Data Reviewed: (see below)  Lab Review:     No results for input(s): WBC, HGB, HCT, PLT, HGBEXT, HCTEXT, PLTEXT, HGBEXT, HCTEXT, PLTEXT in the last 72 hours. Recent Labs     11/13/22  0206 11/11/22  0223   CREA 1.12 1.09       Lab Results   Component Value Date/Time    Glucose (POC) 106 (H) 03/28/2019 08:35 AM     No results for input(s): PH, PCO2, PO2, HCO3, FIO2 in the last 72 hours. No results for input(s): INR, INREXT, INREXT in the last 72 hours. All Micro Results       Procedure Component Value Units Date/Time    CULTURE, BLOOD, PAIRED [[id number]] Collected: 11/09/22 1255    Order Status: Completed Specimen: Blood Updated: 11/13/22 0537     Special Requests: NO SPECIAL REQUESTS        Culture result: NO GROWTH 4 DAYS       CULTURE, ANAEROBIC [[id number]] Collected: 11/09/22 1703    Order Status: Completed Specimen: Surgical Specimen Updated: 11/11/22 1415     Special Requests: NO SPECIAL REQUESTS        Culture result: NO ANAEROBES ISOLATED       CULTURE, WOUND Walker Face STAIN [[id number]]  (Abnormal) Collected: 11/09/22 1703    Order Status: Completed Specimen: Surgical Specimen Updated: 11/11/22 1414     Special Requests: NO SPECIAL REQUESTS        GRAM STAIN FEW WBCS SEEN         NO ORGANISMS SEEN        Culture result:       LIGHT STREPTOCOCCI, BETA HEMOLYTIC GROUP G Penicillin and ampicillin are drugs of choice for treatment of beta-hemolytic streptococcal infections. Susceptibility testing of penicillins and beta-lactams approved by the FDA for treatment of beta-hemolytic streptococcal infections need not be performed routinely, because nonsusceptible isolates are extremely rare.  CLSI 2012      CULTURE, Myra Pates STAIN [[id number]]  (Abnormal) Collected: 11/09/22 1702    Order Status: Completed Specimen: Surgical Specimen Updated: 11/11/22 1409     Special Requests: NO SPECIAL REQUESTS        GRAM STAIN NO WBC'S SEEN NO ORGANISMS SEEN        Culture result:       LIGHT STREPTOCOCCI, BETA HEMOLYTIC GROUP G Penicillin and ampicillin are drugs of choice for treatment of beta-hemolytic streptococcal infections. Susceptibility testing of penicillins and beta-lactams approved by the FDA for treatment of beta-hemolytic streptococcal infections need not be performed routinely, because nonsusceptible isolates are extremely rare. CLSI 2012              LIGHT MIXED SKIN BRIEN ISOLATED          CULTURE, ANAEROBIC [[id number]] Collected: 11/09/22 1702    Order Status: Completed Specimen: Surgical Specimen Updated: 11/11/22 140     Special Requests: NO SPECIAL REQUESTS        Culture result: NO ANAEROBES ISOLATED               I have reviewed notes of prior 24hr. Other pertinent lab: Total time spent with patient: 25 minutes I personally reviewed chart, notes, data and current medications in the medical record. I have personally examined and treated the patient at bedside during this period.                  Care Plan discussed with: Patient, Care Manager, Nursing Staff, and >50% of time spent in counseling and coordination of care    Discussed:  Care Plan    Prophylaxis:  Lovenox    Disposition:  Home w/Family           ___________________________________________________    Attending Physician: Moisés Grove MD

## 2022-11-13 NOTE — PROGRESS NOTES
Post Discharge PICC and Antibiotic Orders    1. Diagnosis: Group G Streptococcus septic joint right ankle  2. Antibiotic: Ceftriaxone 2 g IV daily through 12/21/2022  3. Routine PICC/ Gerardo/ Portacath Care including PRN catheter flow management  4. Weekly labs:   _x__CBC/diff/platelets   _x__BUN/Creatinine   ___CPK   _x__AST/TotalBilirubin/AlkalinePhosphatase   _x__CRP  5. Fax lab to [phone]  Call Critical Lab Values to [phone]  6. May send to IR for line evaluation or replacement [phone]  7. Home Health to pull PIC line at end of therapy or send to IR for Gerardo removal  8. Allergies:     Allergies   Allergen Reactions    Peanut Nausea Only     PEANUTS: NAUSEA AND UPSET STOMACH         Ivana Longest, DO

## 2022-11-13 NOTE — PROGRESS NOTES
ASSESSMENT     Rick Dunn is a 50 y.o. male, who is POD# 4 s/p right ankle septic joint I&D. PLAN    1. Pain control. 2. Mobilize with PT / OT. Weight bearing Status : WBAT RLE with boot. 3. DVT Prophylaxis : Mechanical / Lovenox  4. ID Consult : Awaiting finalized intra-operative cultures. Currently on vancomycin and ceftriaxone. Appreciate further recommendations. 5. Disposition : Case Management for planning. Likely discharge to home with [facility] on Monday with PICC line and 6 weeks IV antibiotics. Nissa Su PA-C  Office : [phone]  Cell: [phone]    Subjective:     Resting, complaining of appropriate pain. Had some pain last night alleviated with adjusting the boot. Able to get out of bed with boot in place to use the restroom. Denies CP, SOB, N/V, fever/chills. Objective:   Patient Vitals for the past 12 hrs:   BP Temp Pulse Resp SpO2 Weight   11/13/22 0755 (!) 147/90 98.3 °F (36.8 °C) 73 16 97 % --   11/13/22 0333 (!) 141/96 97.6 °F (36.4 °C) 72 18 95 % --   11/13/22 0326 -- -- -- -- -- 124.2 kg (273 lb 12.8 oz)   11/12/22 2341 (!) 160/97 98 °F (36.7 °C) 78 18 94 % --       GENERAL: No acute distress. MUSCULOSKELETAL EXAM  Right lower extremity -   Dressing clean, dry, and intact. Boot in place. Calf and thigh soft. Skin is warm and well perfused. Sensation grossly in tact to light touch.    + dorsiflexion, plantarflexion, EHL. LABS :  Recent Labs     11/13/22  0206   CREA 1.12       No results found for: SDES    Lab Results   Component Value Date/Time    Culture result: NO ANAEROBES ISOLATED 11/09/2022 05:03 PM    Culture result: (A) 11/09/2022 05:03 PM     LIGHT STREPTOCOCCI, BETA HEMOLYTIC GROUP G Penicillin and ampicillin are drugs of choice for treatment of beta-hemolytic streptococcal infections.  Susceptibility testing of penicillins and beta-lactams approved by the FDA for treatment of beta-hemolytic streptococcal infections need not be performed routinely, because nonsusceptible isolates are extremely rare. CLSI 2012      Culture result: NO ANAEROBES ISOLATED 11/09/2022 05:02 PM    Culture result: (A) 11/09/2022 05:02 PM     LIGHT STREPTOCOCCI, BETA HEMOLYTIC GROUP G Penicillin and ampicillin are drugs of choice for treatment of beta-hemolytic streptococcal infections. Susceptibility testing of penicillins and beta-lactams approved by the FDA for treatment of beta-hemolytic streptococcal infections need not be performed routinely, because nonsusceptible isolates are extremely rare.  CLSI 2012      Culture result: LIGHT MIXED SKIN BRIEN ISOLATED 11/09/2022 05:02 PM

## 2022-11-13 NOTE — PROGRESS NOTES
11/13/2022  12:24 PM  Care Management Progress Note      ICD-10-CM ICD-9-CM    1. Septic arthritis of right ankle, due to unspecified organism ([facility] [address]  M00.9 711.07       2. Thrombocytosis  D75.839 238.71       3. HUGH (acute kidney injury) ([facility]  N17.9 584.9           RUR:  7%  Risk Level: [x]Low []Moderate []High  Value-based purchasing: [] Yes [x] No  Bundle patient: [] Yes [x] No   Specify:     Transition of care plan:  Awaiting medical clearance and DC order. ID following, and home IV abx submitted. PT/OT treating. Ortho following. PICC line ordered. Home with HH and IV abx - CM met with pt re dispo planning. Pt agreeable to IV abx and HH services. Freedom of Choice offered, and pt indicted [facility] for infusion , and the following for [facility] services: [facility], and [facility]. CM submitted referrals via [employer], and is awaiting responses. Outpatient follow-up. Pt's family to transport.

## 2022-11-13 NOTE — PROGRESS NOTES
Perfectserved ALINA Su if penrose drains are supposed to be removed today; patient stated he was told they would be be pulled today.  PA responded she reviewed chart and saw no notes to pull it; checked with ortho team, they will remove before patient's discharge

## 2022-11-13 NOTE — PROGRESS NOTES
[facility] Progress Note           Shiv Hauser DO    [phone] Office  [phone]  Fax    2022      Assessment & Plan:     Group G streptococcal septic native right ankle. .  Status post I&D . Antibiotics narrowed to ceftriaxone. Patient will need PICC line and 6 weeks of IV antibiotics at discharge. IV antibiotic orders are in the chart. He will need echocardiogram prior to discharge which has been ordered. Anticipate discharge tomorrow once echocardiogram completed and PICC line placed. Discussed with case management  Thrombocytosis. Likely reactive due to above. Elevated creatinine. Resolved          Subjective:     No new complaints. Tolerating antibiotics    Objective:     Vitals: Visit Vitals  BP (!) 147/90 (BP 1 Location: Right lower arm, BP Patient Position: At rest)   Pulse 73   Temp 98.3 °F (36.8 °C)   Resp 16   Ht 5' 11\" (1.803 m)   Wt 273 lb 12.8 oz (124.2 kg)   SpO2 97%   BMI 38.19 kg/m²       Tmax:  Temp (24hrs), Av.9 °F (36.6 °C), Min:97.6 °F (36.4 °C), Max:98.3 °F (36.8 °C)      Exam:   Patient is intubated:      Physical Examination:   General:  Alert, cooperative, no distress   Head:  Normocephalic, atraumatic. Eyes:  Conjunctivae clear   Neck: Supple       Lungs:   No distress. Chest wall:     Heart:     Abdomen:      Extremities: Moves all. Ankle dressed   Skin: No acute rash on exposed skin   Neurologic: CNII-XII intact. Normal strength     Labs:        No lab exists for component: ITNL   No results for input(s): CPK, CKMB, TROIQ in the last 72 hours. Recent Labs     22  0206 22  0223   CREA 1.12 1.09       No results for input(s): INR, PTP, APTT, INREXT, INREXT in the last 72 hours.   Needs: urine analysis, urine sodium, protein and creatinine  No results found for: VIRA, CREAU      Cultures:     No results found for: SDES  Lab Results   Component Value Date/Time    Culture result: NO ANAEROBES ISOLATED 2022 05:03 PM    Culture result: (A) 11/09/2022 05:03 PM     LIGHT STREPTOCOCCI, BETA HEMOLYTIC GROUP G Penicillin and ampicillin are drugs of choice for treatment of beta-hemolytic streptococcal infections. Susceptibility testing of penicillins and beta-lactams approved by the FDA for treatment of beta-hemolytic streptococcal infections need not be performed routinely, because nonsusceptible isolates are extremely rare. CLSI 2012      Culture result: NO ANAEROBES ISOLATED 11/09/2022 05:02 PM    Culture result: (A) 11/09/2022 05:02 PM     LIGHT STREPTOCOCCI, BETA HEMOLYTIC GROUP G Penicillin and ampicillin are drugs of choice for treatment of beta-hemolytic streptococcal infections. Susceptibility testing of penicillins and beta-lactams approved by the FDA for treatment of beta-hemolytic streptococcal infections need not be performed routinely, because nonsusceptible isolates are extremely rare.  CLSI 2012      Culture result: LIGHT MIXED SKIN BRIEN ISOLATED 11/09/2022 05:02 PM       Radiology:     Medications       Current Facility-Administered Medications   Medication Dose Route Frequency Last Admin    zolpidem (AMBIEN) tablet 5 mg  5 mg Oral QHS PRN 5 mg at 11/13/22 0012    hydrALAZINE (APRESOLINE) 20 mg/mL injection 10 mg  10 mg IntraVENous Q4H PRN      HYDROcodone-acetaminophen (NORCO) 5-325 mg per tablet 1 Tablet  1 Tablet Oral Q4H PRN 1 Tablet at 11/13/22 1036    cefTRIAXone (ROCEPHIN) 2 g in 0.9% sodium chloride 20 mL IV syringe  2 g IntraVENous Q24H 2 g at 11/12/22 1319    aspirin delayed-release tablet 81 mg  81 mg Oral DAILY 81 mg at 11/13/22 0852    atorvastatin (LIPITOR) tablet 20 mg  20 mg Oral DAILY 20 mg at 11/13/22 0852    escitalopram oxalate (LEXAPRO) tablet 20 mg  20 mg Oral DAILY 20 mg at 11/13/22 0852    LORazepam (ATIVAN) tablet 1 mg  1 mg Oral Q6H PRN 1 mg at 11/12/22 2145    irbesartan (AVAPRO) tablet 300 mg (Patient Supplied)  300 mg Oral DAILY 300 mg at 11/13/22 0852    sodium chloride (NS) flush 5-40 mL  5-40 mL IntraVENous PRN      acetaminophen (TYLENOL) tablet 650 mg  650 mg Oral Q6H  mg at 11/11/22 0951    Or    acetaminophen (TYLENOL) suppository 650 mg  650 mg Rectal Q6H PRN      polyethylene glycol (MIRALAX) packet 17 g  17 g Oral DAILY PRN      bisacodyL (DULCOLAX) suppository 10 mg  10 mg Rectal DAILY PRN      ondansetron (ZOFRAN) injection 4 mg  4 mg IntraVENous Q6H PRN      morphine injection 4 mg  4 mg IntraVENous Q3H PRN 4 mg at 11/11/22 1121    sodium chloride (NS) flush 5-40 mL  5-40 mL IntraVENous Q8H 10 mL at 11/13/22 0539    sodium chloride (NS) flush 5-40 mL  5-40 mL IntraVENous PRN      naloxone (NARCAN) injection 0.4 mg  0.4 mg IntraVENous PRN      calcium-vitamin D (OS-PEBBLES +D3) 500 mg-200 unit per tablet 1 Tablet  1 Tablet Oral TID WITH MEALS 1 Tablet at 11/13/22 0852    senna-docusate (PERICOLACE) 8.6-50 mg per tablet 1 Tablet  1 Tablet Oral BID 1 Tablet at 11/13/22 0852    polyethylene glycol (MIRALAX) packet 17 g  17 g Oral DAILY 17 g at 11/13/22 4027    bisacodyL (DULCOLAX) suppository 10 mg  10 mg Rectal DAILY PRN      enoxaparin (LOVENOX) injection 30 mg  30 mg SubCUTAneous Q12H 30 mg at 11/13/22 6143           Case discussed with: Case management, hospitalist      Cuca Byrnes DO

## 2022-11-14 ENCOUNTER — APPOINTMENT (OUTPATIENT)
Dept: GENERAL RADIOLOGY | Age: 52
DRG: 494 | End: 2022-11-14
Attending: INTERNAL MEDICINE
Payer: COMMERCIAL

## 2022-11-14 VITALS
TEMPERATURE: 98.7 F | HEART RATE: 79 BPM | BODY MASS INDEX: 38.22 KG/M2 | HEIGHT: 71 IN | WEIGHT: 273 LBS | OXYGEN SATURATION: 95 % | RESPIRATION RATE: 20 BRPM | DIASTOLIC BLOOD PRESSURE: 82 MMHG | SYSTOLIC BLOOD PRESSURE: 132 MMHG

## 2022-11-14 LAB
BACTERIA SPEC CULT: ABNORMAL
BACTERIA SPEC CULT: NORMAL
GRAM STN SPEC: ABNORMAL
GRAM STN SPEC: ABNORMAL
SERVICE CMNT-IMP: ABNORMAL
SERVICE CMNT-IMP: NORMAL

## 2022-11-14 PROCEDURE — 94761 N-INVAS EAR/PLS OXIMETRY MLT: CPT

## 2022-11-14 PROCEDURE — C1751 CATH, INF, PER/CENT/MIDLINE: HCPCS

## 2022-11-14 PROCEDURE — 02HV33Z INSERTION OF INFUSION DEVICE INTO SUPERIOR VENA CAVA, PERCUTANEOUS APPROACH: ICD-10-PCS | Performed by: INTERNAL MEDICINE

## 2022-11-14 PROCEDURE — 77030018786 HC NDL GD F/USND BARD -B

## 2022-11-14 PROCEDURE — 74011250636 HC RX REV CODE- 250/636: Performed by: INTERNAL MEDICINE

## 2022-11-14 PROCEDURE — 77010033678 HC OXYGEN DAILY

## 2022-11-14 PROCEDURE — 74011250637 HC RX REV CODE- 250/637: Performed by: NURSE PRACTITIONER

## 2022-11-14 PROCEDURE — 71045 X-RAY EXAM CHEST 1 VIEW: CPT

## 2022-11-14 PROCEDURE — 74011250637 HC RX REV CODE- 250/637: Performed by: INTERNAL MEDICINE

## 2022-11-14 PROCEDURE — 74011000250 HC RX REV CODE- 250: Performed by: ORTHOPAEDIC SURGERY

## 2022-11-14 PROCEDURE — 74011250637 HC RX REV CODE- 250/637: Performed by: ORTHOPAEDIC SURGERY

## 2022-11-14 PROCEDURE — 76937 US GUIDE VASCULAR ACCESS: CPT

## 2022-11-14 PROCEDURE — 74011000250 HC RX REV CODE- 250: Performed by: INTERNAL MEDICINE

## 2022-11-14 RX ORDER — FERROUS SULFATE, DRIED 160(50) MG
1 TABLET, EXTENDED RELEASE ORAL
Qty: 90 TABLET | Refills: 0 | Status: SHIPPED
Start: 2022-11-14

## 2022-11-14 RX ORDER — HYDROCODONE BITARTRATE AND ACETAMINOPHEN 5; 325 MG/1; MG/1
1 TABLET ORAL
Qty: 20 TABLET | Refills: 0 | Status: SHIPPED | OUTPATIENT
Start: 2022-11-14 | End: 2022-11-17

## 2022-11-14 RX ADMIN — ESCITALOPRAM OXALATE 20 MG: 10 TABLET ORAL at 08:46

## 2022-11-14 RX ADMIN — Medication 1 TABLET: at 17:42

## 2022-11-14 RX ADMIN — Medication 1 TABLET: at 13:09

## 2022-11-14 RX ADMIN — ASPIRIN 81 MG: 81 TABLET, COATED ORAL at 08:47

## 2022-11-14 RX ADMIN — SODIUM CHLORIDE, PRESERVATIVE FREE 10 ML: 5 INJECTION INTRAVENOUS at 05:23

## 2022-11-14 RX ADMIN — SODIUM CHLORIDE, PRESERVATIVE FREE 2 G: 5 INJECTION INTRAVENOUS at 13:08

## 2022-11-14 RX ADMIN — Medication 1 TABLET: at 08:46

## 2022-11-14 RX ADMIN — ATORVASTATIN CALCIUM 20 MG: 20 TABLET, FILM COATED ORAL at 08:48

## 2022-11-14 RX ADMIN — HYDROCODONE BITARTRATE AND ACETAMINOPHEN 1 TABLET: 5; 325 TABLET ORAL at 08:47

## 2022-11-14 RX ADMIN — SODIUM CHLORIDE, PRESERVATIVE FREE 10 ML: 5 INJECTION INTRAVENOUS at 13:11

## 2022-11-14 RX ADMIN — HYDROCODONE BITARTRATE AND ACETAMINOPHEN 1 TABLET: 5; 325 TABLET ORAL at 17:42

## 2022-11-14 RX ADMIN — IRBESARTAN 300 MG: 300 TABLET ORAL at 08:55

## 2022-11-14 RX ADMIN — HYDROCODONE BITARTRATE AND ACETAMINOPHEN 1 TABLET: 5; 325 TABLET ORAL at 02:33

## 2022-11-14 NOTE — PROGRESS NOTES
Physical Therapy Note:  RN consulted stating pt is off flr for PICC line. Also stating pt is to be discharge home today.   Order placed for RW for CM per pt request.  Ana Lee, PT

## 2022-11-14 NOTE — DISCHARGE SUMMARY
Hospitalist Discharge Summary     Patient ID:    Leo Angelo  [id number]  50 y.o.  1970    Admit date of service: 11/9/2022    Discharge date of service: 11/14/2022    Admission Diagnoses: Septic arthritis of right ankle ([address] [M00.9]    Chronic Diagnoses:    Problem List as of 11/14/2022 Date Reviewed: 4/14/2021            Codes Class Noted - Resolved    Septic arthritis of right ankle [facility]) ICD-10-CM: M00.9  ICD-9-CM: 711.07  11/9/2022 - Present        Inguinodynia, left ICD-10-CM: R10.32  ICD-9-CM: 789.04  4/14/2021 - Present        Anxiety and depression ICD-10-CM: F41.9, F32. A  ICD-9-CM: 300.00, 311  Unknown - Present        GERD (gastroesophageal reflux disease) ICD-10-CM: K21.9  ICD-9-CM: 530.81  Unknown - Present        Sleep apnea ICD-10-CM: G47.30  ICD-9-CM: 780.57  Unknown - Present    Overview Signed 6/7/2019  3:59 PM by Zoila Garcia MD     CPAP              Adverse effect of anesthesia ICD-10-CM: T41.45XA  ICD-9-CM: E938.4  Unknown - Present    Overview Signed 6/7/2019  4:00 PM by Zoila Garcia MD     TAKES A LONG TIME TO WAKE UP             Complex tear of medial meniscus of left knee ICD-10-CM: A70.666A  ICD-9-CM: 836.0  3/28/2019 - Present        Hyperlipidemia ICD-10-CM: E78.5  ICD-9-CM: 272.4  10/9/2011 - Present        Lipoma ICD-10-CM: D17.9  ICD-9-CM: 214.9  10/9/2011 - Present        HTN, goal below 140/90 ICD-10-CM: I10  ICD-9-CM: 401.9  10/9/2011 - Present        RESOLVED: HTN (hypertension) ICD-10-CM: I10  ICD-9-CM: 401.9  10/9/2011 - 6/7/2019        RESOLVED: Carbuncle ICD-10-CM: L02.93  ICD-9-CM: 680.9  10/9/2011 - 6/7/2019           Discharge Medications:   Current Discharge Medication List        START taking these medications    Details   calcium-vitamin D (OS-PEBBLES +D3) 500 mg-200 unit per tablet Take 1 Tablet by mouth three (3) times daily (with meals).   Qty: 90 Tablet, Refills: 0      HYDROcodone-acetaminophen (NORCO) 5-325 mg per tablet Take 1 Tablet by mouth every four (4) hours as needed for Pain for up to 3 days. Max Daily Amount: 6 Tablets. Qty: 20 Tablet, Refills: 0    Associated Diagnoses: Streptococcal arthritis of right ankle (HCC)           CONTINUE these medications which have NOT CHANGED    Details   aspirin delayed-release 81 mg tablet Take 81 mg by mouth daily. escitalopram oxalate (LEXAPRO) 20 mg tablet TAKE 1 TABLET BY MOUTH DAILY  Qty: 90 Tab, Refills: 1    Associated Diagnoses: Anxiety and depression      irbesartan (AVAPRO) 300 mg tablet TAKE 1 TABLET BY MOUTH DAILY  Qty: 90 Tab, Refills: 0    Comments: Pt to schedule appt for additional refills. atorvastatin (LIPITOR) 20 mg tablet Take 20 mg by mouth daily. loratadine (CLARITIN) 10 mg tablet Take 10 mg by mouth daily. Follow up Care:    1. Dwight Almeida MD in 1-2 weeks  2. orthopedics    Diet:  Regular Diet    Disposition:  Home. Advanced Directive:    Discharge Exam:  See today's note. CONSULTATIONS: Orthopedic Surgery    Significant Diagnostic Studies:   No results for input(s): WBC, HGB, HCT, PLT, HGBEXT, HCTEXT, PLTEXT in the last 72 hours. Recent Labs     11/13/22  0206   CREA 1.12     No results for input(s): ALT, AP, TBIL, TBILI, TP, ALB, GLOB, GGT, AML, LPSE in the last 72 hours. No lab exists for component: SGOT, GPT, AMYP, HLPSE  No results for input(s): INR, PTP, APTT, INREXT in the last 72 hours. No results for input(s): FE, TIBC, PSAT, FERR in the last 72 hours. No results for input(s): PH, PCO2, PO2 in the last 72 hours. No results for input(s): CPK, CKMB in the last 72 hours. No lab exists for component: TROPONINI  Lab Results   Component Value Date/Time    Glucose (POC) 106 (H) 03/28/2019 08:35 AM             HOSPITAL COURSE & TREATMENT RENDERED:   R ankle septic joint: s/p I&D 11/9. Ortho and ID following. Was on Vanc, CTx. ABx narrowed down to CTx through 12/21/22. cx shows: strep beta hemolytic. 2.  HTN: Cont home Avapro     3. Anxiety: Cont home lexapro     4. Obesity. Would benefit from wt loss          Discharged in improved condition.     Spent 35 minutes    Signed:  Georgette Wyman MD  11/14/2022  9:58 AM

## 2022-11-14 NOTE — PROGRESS NOTES
VAT note- To acknowledge order for PICC placement for long term antibiotics. Chart reviewed for PICC placement evaluation. Will plan to place PICC before 12 noon today.

## 2022-11-14 NOTE — DISCHARGE INSTRUCTIONS
ACUTE DIAGNOSES:  Septic arthritis of right ankle (HCC) [M00.9]    CHRONIC MEDICAL DIAGNOSES:  [unfilled]    DISCHARGE MEDICATIONS:          It is important that you take the medication exactly as they are prescribed. Keep your medication in the bottles provided by the pharmacist and keep a list of the medication names, dosages, and times to be taken in your wallet. Do not take other medications without consulting your doctor. DIET:  Regular Diet    ACTIVITY: Activity as tolerated    ADDITIONAL INFORMATION: If you experience any of the following symptoms then please call your primary care physician or return to the emergency room if you cannot get hold of your doctor: Fever, chills, nausea, vomiting, diarrhea, change in mentation, falling, bleeding, shortness of breath. FOLLOW UP CARE:  Primary care physician. you are to call and set up an appointment to see them in 5 days. Follow-up with orthopedics       Information obtained by :  I understand that if any problems occur once I am at home I am to contact my physician. I understand and acknowledge receipt of the instructions indicated above. Physician's or R.N.'s Signature                                                                  Date/Time                                                                                                                                              Patient or Representative Signature                                                          Date/Time          Camron Krause MD  Foot and Ankle Surgery  General Orthopaedics      POST-OP Instructions    BLOOD CLOTS: To prevent blood clot formation, you have been prescribed    aspirin 81mg tablets to be taken by mouth twice daily for 6 weeks.     PAIN CONTROL: For pain control, you have been prescribed narcotic    medication (Oxycodone or Norco). Take as prescribed and wean as able. ***VERY IMPORTANT - PLEASE READ***. If you were given a nerve block by the anesthesia team to numb your leg, you must start taking pain medication BEFORE the block wears off EVEN IF YOU ARE NOT HAVING PAIN. If you do not start taking pain medication before the block wears off, you will be behind on pain control. The block will usually wear off in 12-24 hours after surgery, so you must have pain medication in your system before the block wears off. Take pain medication with food if possible to minimize stomach irritation. You may    take tylenol (acetaminophen) with Oxycodone but not with medications that    already have acetaminophen in them such as Norco. While taking narcotics, you    may have constipation. A stool softener is recommended-- you may use an over-    the-counter stool softener or use the one that has been prescribed for you. We    recommend no ibuprofen (Advil, Motrin, etc) since there are some studies that    show it may interfere with bone healing. Do not drink alcohol or drive while using    narcotic pain medication. *** VERY IMPORTANT - PLEASE READ*** Please monitor the supply of your pain medicine closely and if it looks like you're going to run out of pain medicine over the weekend please call the office on [facility] prior to the weekend to request a refill. The on call physician for nights and weekends CANNOT refill pain medicine. You will either have to wait until Monday morning when the office re-opens or you will have to go to an urgent care/ emergency room if you are in need of pain medicine. NAUSEA/VOMITING: Sometimes after surgery, patients have nausea or    vomiting. A medicine has been prescribed for this. If you do not have nausea or    vomiting, you do not need to have this prescription filled.     HOME MEDICATIONS: Resume medications you were taking prior to surgery    unless you have been counseled to discontinue them. POST-OPERATIVE INSTRUCTIONS:    ACTIVITY: Please elevate your operative extremity above the level of your heart    for the first 4 days, as much as possible after surgery (a good way to remember    this is (toes above nose). Moving around and walking (with crutches) helps    decrease the risk of blood clots. While you are sleeping and when you are not    being active, continue to keep your leg elevated as much as possible. It is    common to have swelling in your feet after surgery for even a year afterwards, so    the more you keep the leg elevated after surgery, the less swelling you will have    later on. WEIGHTBEARING: You are to be weight-bearing as tolerated to your operative leg. GENERAL INSTRUCTIONS:    1. Be alert for signs of infection including redness, streaking, odor,    fever or chills. Be alert for excessive pain or bleeding and notify    your surgeon immediately. 2. Notify Provider of nausea, vomiting, or chills, numbness or weakness,    bleeding, redness, swelling, pain, or drainage from surgical incision(s), bowel or    bladder dysfunction, severe pain not relieved by pain medications, temperature    greater than 101.0 F (38.3 C) degrees    3. If you smoke (or have smoked within the last year), we strongly recommend    that you do not smoke. DIET AND COMFORT: Return to your regular diet as tolerated. Begin with light    or bland foods. Drink plenty of fluids. DRESSING CARE:    1. Leave your dressing/cast/splint in place until your post operative visit. Keep it    clean and dry. Mild to moderate blood or drainage after surgery is expected. 2. Keep your operative extremity elevated. Avoid pressure under the heel by    placing pillows under your calf, not your foot. FOLLOWUP INSTRUCTIONS:    1. Follow up in clinic with your surgeon this week.  If your appointment has    not already been made at the time of discharge, you will need to call to make the    appointment. 2. The general scheduling number is [phone]    3. Contact your physicians office during office hours.   For medical emergencies    call [phone]

## 2022-11-14 NOTE — PROGRESS NOTES
Oren Prabhakar [facility] [address]  [address]  [phone]      Hospitalist Progress Note      NAME: Alpesh Juarez Both   :  1970  MRM:  [id number]    Date of service: 2022  10:06 AM       Assessment and Plan:   R ankle septic joint: s/p I&D . Ortho and ID following. Was on Vanc, CTx. ABx narrowed down to CTx through 22. cx shows: strep beta hemolytic. 2.  HTN: Cont home Avapro     3. Anxiety: Cont home lexapro    4. Obesity. Would benefit from wt loss              Subjective:     Chief Complaint[de-identified] Patient was seen and examined as a follow up for septic joint. Chart was reviewed. feels better     ROS:  (bold if positive, if negative)    Tolerating PT  Tolerating Diet        Objective:     Last 24hrs VS reviewed since prior progress note.  Most recent are:    Visit Vitals  BP (!) 140/86 (BP 1 Location: Right upper arm)   Pulse 82   Temp 97.8 °F (36.6 °C)   Resp 18   Ht 5' 11\" (1.803 m)   Wt 123.8 kg (273 lb)   SpO2 94%   BMI 38.08 kg/m²     SpO2 Readings from Last 6 Encounters:   22 94%   21 97%   20 99%   20 95%   19 98%   11/15/19 97%    O2 Flow Rate (L/min): 2 l/min     Intake/Output Summary (Last 24 hours) at 2022 8088  Last data filed at 2022 9550  Gross per 24 hour   Intake 220 ml   Output 0 ml   Net 220 ml          Physical Exam:    Gen:  obese, in no acute distress  HEENT:  Pink conjunctivae, PERRL, hearing intact to voice, moist mucous membranes  Neck:  Supple, without masses, thyroid non-tender  Resp:  No accessory muscle use, clear breath sounds without wheezes rales or rhonchi  Card:  No murmurs, normal S1, S2 without thrills, bruits or peripheral edema  Abd:  Soft, non-tender, non-distended, normoactive bowel sounds are present, no palpable organomegaly and no detectable hernias  Lymph:  No cervical or inguinal adenopathy  Musc:  No cyanosis or clubbing  Skin:  No rashes or ulcers, skin turgor is good  Neuro:  Cranial nerves are grossly intact, no focal motor weakness, follows commands appropriately  Psych:  Good insight, oriented to person, place and time, alert  __________________________________________________________________  Medications Reviewed: (see below)  Medications:     Current Facility-Administered Medications   Medication Dose Route Frequency    zolpidem (AMBIEN) tablet 5 mg  5 mg Oral QHS PRN    hydrALAZINE (APRESOLINE) 20 mg/mL injection 10 mg  10 mg IntraVENous Q4H PRN    HYDROcodone-acetaminophen (NORCO) 5-325 mg per tablet 1 Tablet  1 Tablet Oral Q4H PRN    cefTRIAXone (ROCEPHIN) 2 g in 0.9% sodium chloride 20 mL IV syringe  2 g IntraVENous Q24H    aspirin delayed-release tablet 81 mg  81 mg Oral DAILY    atorvastatin (LIPITOR) tablet 20 mg  20 mg Oral DAILY    escitalopram oxalate (LEXAPRO) tablet 20 mg  20 mg Oral DAILY    LORazepam (ATIVAN) tablet 1 mg  1 mg Oral Q6H PRN    irbesartan (AVAPRO) tablet 300 mg (Patient Supplied)  300 mg Oral DAILY    sodium chloride (NS) flush 5-40 mL  5-40 mL IntraVENous PRN    acetaminophen (TYLENOL) tablet 650 mg  650 mg Oral Q6H PRN    Or    acetaminophen (TYLENOL) suppository 650 mg  650 mg Rectal Q6H PRN    polyethylene glycol (MIRALAX) packet 17 g  17 g Oral DAILY PRN    bisacodyL (DULCOLAX) suppository 10 mg  10 mg Rectal DAILY PRN    ondansetron (ZOFRAN) injection 4 mg  4 mg IntraVENous Q6H PRN    morphine injection 4 mg  4 mg IntraVENous Q3H PRN    sodium chloride (NS) flush 5-40 mL  5-40 mL IntraVENous Q8H    sodium chloride (NS) flush 5-40 mL  5-40 mL IntraVENous PRN    naloxone (NARCAN) injection 0.4 mg  0.4 mg IntraVENous PRN    calcium-vitamin D (OS-PEBBLES +D3) 500 mg-200 unit per tablet 1 Tablet  1 Tablet Oral TID WITH MEALS    senna-docusate (PERICOLACE) 8.6-50 mg per tablet 1 Tablet  1 Tablet Oral BID    polyethylene glycol (MIRALAX) packet 17 g  17 g Oral DAILY    bisacodyL (DULCOLAX) suppository 10 mg  10 mg Rectal DAILY PRN    enoxaparin (LOVENOX) injection 30 mg  30 mg SubCUTAneous Q12H        Lab Data Reviewed: (see below)  Lab Review:     No results for input(s): WBC, HGB, HCT, PLT, HGBEXT, HCTEXT, PLTEXT, HGBEXT, HCTEXT, PLTEXT in the last 72 hours. Recent Labs     11/13/22  0206   CREA 1.12       Lab Results   Component Value Date/Time    Glucose (POC) 106 (H) 03/28/2019 08:35 AM     No results for input(s): PH, PCO2, PO2, HCO3, FIO2 in the last 72 hours. No results for input(s): INR, INREXT, INREXT in the last 72 hours. All Micro Results       Procedure Component Value Units Date/Time    CULTURE, BLOOD, PAIRED [[id number]] Collected: 11/09/22 1255    Order Status: Completed Specimen: Blood Updated: 11/14/22 0547     Special Requests: NO SPECIAL REQUESTS        Culture result: NO GROWTH 5 DAYS       CULTURE, ANAEROBIC [[id number]] Collected: 11/09/22 1703    Order Status: Completed Specimen: Surgical Specimen Updated: 11/11/22 1415     Special Requests: NO SPECIAL REQUESTS        Culture result: NO ANAEROBES ISOLATED       CULTURE, WOUND Zack Banter STAIN [[id number]]  (Abnormal) Collected: 11/09/22 1703    Order Status: Completed Specimen: Surgical Specimen Updated: 11/11/22 1414     Special Requests: NO SPECIAL REQUESTS        GRAM STAIN FEW WBCS SEEN         NO ORGANISMS SEEN        Culture result:       LIGHT STREPTOCOCCI, BETA HEMOLYTIC GROUP G Penicillin and ampicillin are drugs of choice for treatment of beta-hemolytic streptococcal infections. Susceptibility testing of penicillins and beta-lactams approved by the FDA for treatment of beta-hemolytic streptococcal infections need not be performed routinely, because nonsusceptible isolates are extremely rare.  CLSI 2012      CULTURE, Collin Grout STAIN [[id number]]  (Abnormal) Collected: 11/09/22 1702    Order Status: Completed Specimen: Surgical Specimen Updated: 11/11/22 1409     Special Requests: NO SPECIAL REQUESTS        GRAM STAIN NO WBC'S SEEN         NO ORGANISMS SEEN        Culture result:       LIGHT STREPTOCOCCI, BETA HEMOLYTIC GROUP G Penicillin and ampicillin are drugs of choice for treatment of beta-hemolytic streptococcal infections. Susceptibility testing of penicillins and beta-lactams approved by the FDA for treatment of beta-hemolytic streptococcal infections need not be performed routinely, because nonsusceptible isolates are extremely rare. CLSI 2012              LIGHT MIXED SKIN BRIEN ISOLATED          CULTURE, ANAEROBIC [[id number]] Collected: 11/09/22 1702    Order Status: Completed Specimen: Surgical Specimen Updated: 11/11/22 1402     Special Requests: NO SPECIAL REQUESTS        Culture result: NO ANAEROBES ISOLATED               I have reviewed notes of prior 24hr. Other pertinent lab: Total time spent with patient: 25 minutes I personally reviewed chart, notes, data and current medications in the medical record. I have personally examined and treated the patient at bedside during this period.                  Care Plan discussed with: Patient, Care Manager, Nursing Staff, and >50% of time spent in counseling and coordination of care    Discussed:  Care Plan    Prophylaxis:  Lovenox    Disposition:  Home w/Family           ___________________________________________________    Attending Physician: Cat Hills MD

## 2022-11-14 NOTE — PROGRESS NOTES
Bedside shift change report given to Emilee (oncoming nurse) by Sebas Garcia (offgoing nurse). Report included the following information SBAR, Kardex, Procedure Summary, Intake/Output, MAR, and Recent Results.

## 2022-11-14 NOTE — ROUTINE PROCESS
Discharge instructions reviewed with patient and family. Verbalize understanding. Pt to be escorted by Farhta Nathan for transport.     Devante Garcia RN

## 2022-11-14 NOTE — PROGRESS NOTES
PICC Placement Note    PRE-PROCEDURE VERIFICATION  Correct Procedure: yes  Correct Site:  yes  Temperature: Temp: 97.8 °F (36.6 °C), Temperature Source: Temp Source: Oral  Recent Labs     11/13/22  0206   CREA 1.12     Allergies: Peanut  Education materials for PICC Care given: yes. See Patient Education activity for further details. PICC Booklet placed at bedside: yes    Closed Ended PICC Catheters:  Flush Lumens as Follows:  Intermittent Medication:   Flush before and after each medication with 10 ml NS. Unused Ports:  Flush every 8 hours with 10 ml NS.  TPN Ports:  Flush every 24 hours with 20 ml NS prior to hanging new bag. Blood Draws: Stop infusion, draw off and waste 10 ml of blood. Draw sample with 10cc syringe or greater. DO NOT USE VACUTAINER . Transfer with appropriate device to lab  tubes. Flush with 20 ml NS. Dressing Change:  Every 7 days, and PRN using sterile technique if integrity of dressing is compromised. Initial dressing change for central line 24-48 hours post insertion if gauze is used. Apply new dressing per policy. PROCEDURE DETAIL  Consent was obtained and all questions were answered related to risks and benefits. A single lumen PICC line was inserted, as a sterile procedure using ultrasound and modified Seldinger technique for antibiotic therapy and Home IV Therapy. The following documentation is in addition to the PICC properties in the lines/airways flowsheet :  Lot #: [id number]  Lidocaine 1% administered intradermally :yes  Internal Catheter Total Length: 48cm with 1cm out  Vein Selection for PICC:right cephalic  Central Line Bundle followed yes  Complication Related to Insertion:no    The placement was verified by EKG, MAX P WAVE @ 48cm with 1cm out. PER EKG PICC TIP @ C/A junction.       X-ray: no.     Line is okay to use: yes    Iva Lovett, RN

## 2022-11-14 NOTE — PROGRESS NOTES
11/14/2022  11:54 AM  Order for RW noted, EMR reviewed, pt received RW 11/11 through [employer], order completed  Awaiting PICC placement and teaching by infusion company  MANUEL Crouch       9:07 AM  Pt is accepted by [facility] for services. Nursing is covered by his insurance, however, pt has a $30.00 co-pay per visit for PT/OT. CM discussed this w/ pt and [facility]S updated   Awaiting benefits for IV ABx from [address] [place] [address] placement. Will follow.   MANUEL Crouch

## 2022-11-14 NOTE — PROGRESS NOTES
Orthopaedic Progress Note  Post Op day: 5 Days Post-Op    2022 10:50 AM     Patient: Meghana Friday MRN: [id number]  SSN: [id number]    YOB: 1970  Age: 50 y.o. Sex: male      Admit date:  2022  Date of Surgery:  2022   Procedures:  Procedure(s):  RIGHT ANKLE ARTHROTOMY, INCISION AND DRAINAGE RIGHT ANKLE  Admitting Physician:  Wilbert Fitzgerald MD   Surgeon:  Cody Do) and Role:     * Aida Bowen MD - Primary    Consulting Physician(s): Treatment Team: Attending Provider: Deny Angelo MD; Physician Assistant: ALINA Valdez; Consulting Provider: Reyes Padron DO; Surgeon: Aida Bowen MD; Utilization Review: Shawn Long RN; Care Manager: Radha Jasso Primary Nurse: Antonella Jane RN; Primary Nurse: Elise Corbin RN; Occupational Therapy Assistant: SHELLI Gama    SUBJECTIVE:     Meghana Friday is a 50 y.o. male is 5 Days Post-Op s/p Procedure(s):  RIGHT ANKLE ARTHROTOMY, INCISION AND DRAINAGE RIGHT ANKLE with an appropriate level of post-operative pain. He reports decreasing pain in the ankle. No complaints of nausea, vomiting, dizziness, lightheadedness, chest pain, or shortness of breath. OBJECTIVE:       Physical Exam:  General: Alert, cooperative, no distress. Respiratory: Respirations unlabored  Neurological:  Neurovascular exam within normal limits. Motor: + DF/PF. Musculoskeletal: Calves soft, supple, non-tender upon palpation. No erythema. Medial and lateral incisions are intact with penrose drains. Wiggles toes. SILT. +DP pulse. Dressing/Wound:  Clean, dry and intact. No significant erythema or swelling. Penrose drains in place.       Vital Signs:      Patient Vitals for the past 8 hrs:   BP Temp Pulse Resp SpO2   22 0817 (!) 140/86 97.8 °F (36.6 °C) 82 18 94 %   22 0417 (!) 141/84 97.9 °F (36.6 °C) 73 18 93 %                                          Temp (24hrs), Av.3 °F (36.8 °C), Min:97.8 °F (36.6 °C), Max:98.8 °F (37.1 °C)      Labs:      No results for input(s): HCT, HGB, INR, HCTEXT, HGBEXT, INREXT in the last 72 hours. Lab Results   Component Value Date/Time    Sodium 136 11/10/2022 02:56 AM    Potassium 4.2 11/10/2022 02:56 AM    Chloride 103 11/10/2022 02:56 AM    CO2 28 11/10/2022 02:56 AM    Glucose 163 (H) 11/10/2022 02:56 AM    BUN 15 11/10/2022 02:56 AM    Creatinine 1.12 11/13/2022 02:06 AM    Calcium 8.8 11/10/2022 02:56 AM       PT/OT:        Gait  Base of Support: Widened  Speed/[place]: Slow, Pace decreased (<100 feet/min)  Step Length: Left shortened  Gait Abnormalities: Step to gait  Ambulation - Level of Assistance: Contact guard assistance  Distance (ft): 100 Feet (ft)  Assistive Device: Gait belt, Crutches, Walker, rolling       Patient mobility  Bed Mobility Training  Rolling: Modified independent  Supine to Sit: Modified independent  Sit to Supine: Modified independent  Scooting: Modified independent  Transfer Training  Sit to Stand: Supervision  Stand to Sit: Supervision      Gait Training  Assistive Device: Gait belt, Crutches, Walker, rolling  Ambulation - Level of Assistance: Contact guard assistance  Distance (ft): 100 Feet (ft)   Weight Bearing Status  Right Side Weight Bearing: As tolerated        ASSESSMENT / PLAN:   Active Problems:    Septic arthritis of right ankle ([address] (11/9/2022)            A: 1. S/P right ankle I and D POD 5    P: 1. Plan for DC today with 6 weeks of IV antibiotics after PICC placed. 2. Penrose drains removed today. New dressing placed. Plan for at home daily dressing changes with nonstick dressing and wrap with kerlex and ACE  3. F/U with Dr. Bj Kurtz this week. DC placed in chart  4. Orthopedics available for questions.      Signed By:  [facility], [address]

## 2022-11-29 ENCOUNTER — TELEPHONE (OUTPATIENT)
Dept: FAMILY MEDICINE CLINIC | Age: 52
End: 2022-11-29

## 2022-11-29 NOTE — TELEPHONE ENCOUNTER
Pt called asking why is he on the antibiotic he is on. ..   and is he able to get the covid or the flu vaccine while on it? Please call or text, no my chart.      Kassie Boyer

## 2022-11-29 NOTE — TELEPHONE ENCOUNTER
America Kelly, DO  [facility] 4 minutes ago (11:44 AM)     MG  Please let patient know he can get either flu or COVID vaccination while on ceftriaxone        Called pt, and he has been advised and states understanding of this.

## 2022-12-05 ENCOUNTER — HOSPITAL ENCOUNTER (OUTPATIENT)
Dept: MRI IMAGING | Age: 52
Discharge: HOME | End: 2022-12-05
Attending: ORTHOPAEDIC SURGERY
Payer: COMMERCIAL

## 2022-12-05 ENCOUNTER — TRANSCRIBE ORDER (OUTPATIENT)
Dept: SCHEDULING | Age: 52
End: 2022-12-05

## 2022-12-05 DIAGNOSIS — S46.211A BICEPS TENDON RUPTURE, RIGHT, INITIAL ENCOUNTER: Primary | ICD-10-CM

## 2022-12-05 DIAGNOSIS — S46.211A BICEPS TENDON RUPTURE, RIGHT, INITIAL ENCOUNTER: ICD-10-CM

## 2022-12-05 PROCEDURE — 73221 MRI JOINT UPR EXTREM W/O DYE: CPT

## 2022-12-20 ENCOUNTER — TELEPHONE (OUTPATIENT)
Dept: FAMILY MEDICINE CLINIC | Age: 52
End: 2022-12-20

## 2022-12-20 NOTE — TELEPHONE ENCOUNTER
Clarify that the pt is going to be done with his antibiotics tomorrow 12/21/22. .. So that they can remove the picc line. Need a verbal order.      Yenny Mosley

## 2022-12-20 NOTE — TELEPHONE ENCOUNTER
Spoke with Leopold Fritz at [facility] and advised her per Dr. Edis Dover Yes PICC may be removed. There is an order for home health to remove PICC at end of therapy in original orders . Mary verbalized understanding.

## 2023-04-22 DIAGNOSIS — S93.401D SPRAIN OF LIGAMENT OF RIGHT ANKLE, SUBSEQUENT ENCOUNTER: Primary | ICD-10-CM

## 2023-11-22 ENCOUNTER — OFFICE VISIT (OUTPATIENT)
Age: 53
End: 2023-11-22
Payer: COMMERCIAL

## 2023-11-22 VITALS
HEIGHT: 71 IN | WEIGHT: 272.4 LBS | BODY MASS INDEX: 38.14 KG/M2 | HEART RATE: 89 BPM | TEMPERATURE: 98 F | DIASTOLIC BLOOD PRESSURE: 82 MMHG | OXYGEN SATURATION: 98 % | SYSTOLIC BLOOD PRESSURE: 134 MMHG

## 2023-11-22 DIAGNOSIS — G47.33 OSA (OBSTRUCTIVE SLEEP APNEA): Primary | ICD-10-CM

## 2023-11-22 DIAGNOSIS — F41.9 ANXIETY AND DEPRESSION: ICD-10-CM

## 2023-11-22 DIAGNOSIS — F32.A ANXIETY AND DEPRESSION: ICD-10-CM

## 2023-11-22 DIAGNOSIS — I10 PRIMARY HYPERTENSION: ICD-10-CM

## 2023-11-22 PROCEDURE — 99204 OFFICE O/P NEW MOD 45 MIN: CPT | Performed by: NURSE PRACTITIONER

## 2023-11-22 PROCEDURE — 3075F SYST BP GE 130 - 139MM HG: CPT | Performed by: NURSE PRACTITIONER

## 2023-11-22 PROCEDURE — 3079F DIAST BP 80-89 MM HG: CPT | Performed by: NURSE PRACTITIONER

## 2023-11-22 RX ORDER — FLUTICASONE PROPIONATE 50 MCG
SPRAY, SUSPENSION (ML) NASAL
COMMUNITY

## 2023-11-22 ASSESSMENT — SLEEP AND FATIGUE QUESTIONNAIRES
HOW LIKELY ARE YOU TO NOD OFF OR FALL ASLEEP WHILE SITTING AND READING: 1
HOW LIKELY ARE YOU TO NOD OFF OR FALL ASLEEP WHEN YOU ARE A PASSENGER IN A CAR FOR AN HOUR WITHOUT A BREAK: 1
HOW LIKELY ARE YOU TO NOD OFF OR FALL ASLEEP WHILE SITTING QUIETLY AFTER LUNCH WITHOUT ALCOHOL: 1
HOW LIKELY ARE YOU TO NOD OFF OR FALL ASLEEP IN A CAR, WHILE STOPPED FOR A FEW MINUTES IN TRAFFIC: 0
HOW LIKELY ARE YOU TO NOD OFF OR FALL ASLEEP WHILE WATCHING TV: 1
HOW LIKELY ARE YOU TO NOD OFF OR FALL ASLEEP WHILE LYING DOWN TO REST IN THE AFTERNOON WHEN CIRCUMSTANCES PERMIT: 2
HOW LIKELY ARE YOU TO NOD OFF OR FALL ASLEEP WHILE SITTING INACTIVE IN A PUBLIC PLACE: 1
HOW LIKELY ARE YOU TO NOD OFF OR FALL ASLEEP WHILE SITTING AND TALKING TO SOMEONE: 0
ESS TOTAL SCORE: 7

## 2023-11-22 NOTE — PATIENT INSTRUCTIONS
[address] [facility].[address], [address]  Tel.  [phone]  Fax. [address] [facility], [address] [place] [address]  Tel.  [phone]  Fax. [phone] [facility], [address] [facility]  Tel.  [phone]  Fax. [phone]     Learning About CPAP for Sleep Apnea  What is CPAP? CPAP is a small machine that you use at home every night while you sleep. It increases air pressure in your throat to keep your airway open. When you have sleep apnea, this can help you sleep better so you feel much better. CPAP stands for \"continuous positive airway pressure. \"  The CPAP machine will have one of the following:  A mask that covers your nose and mouth  Prongs that fit into your nose  A mask that covers your nose only, the most common type. This type is called NCPAP. The N stands for \"nasal.\"  Why is it done? CPAP is usually the best treatment for obstructive sleep apnea. It is the first treatment choice and the most widely used. Your doctor may suggest CPAP if you have: Moderate to severe sleep apnea. Sleep apnea and coronary artery disease (CAD) or heart failure. How does it help? CPAP can help you have more normal sleep, so you feel less sleepy and more alert during the daytime. CPAP may help keep heart failure or other heart problems from getting worse. NCPAP may help lower your blood pressure. If you use CPAP, your bed partner may also sleep better because you are not snoring or restless. What are the side effects? Some people who use CPAP have:  A dry or stuffy nose and a sore throat. Irritated skin on the face. Sore eyes. Bloating. If you have any of these problems, work with your doctor to fix them. Here are some things you can try:  Be sure the mask or nasal prongs fit well. See if your doctor can adjust the pressure of your CPAP. If your nose is dry, try a humidifier.   If your nose is runny or stuffy, try decongestant medicine or a steroid

## 2024-01-02 ENCOUNTER — TELEPHONE (OUTPATIENT)
Age: 54
End: 2024-01-02

## (undated) DEVICE — NITINOL GUIDEWIRE 1.2 MM X 45CM

## (undated) DEVICE — 4.5 MM HELICUT STRAIGHT BURRS,                                    POWER/EP-1, SLATE, 5000 MAXIMUM RPM,                                    PACKAGED 6 PER BOX, STERILE: Brand: DYONICS HELICUT

## (undated) DEVICE — SURGICAL PROCEDURE PACK BASIN MAJ SET CUST NO CAUT

## (undated) DEVICE — SUTURE VCRL 3-0 L36IN ABSRB UD CT L40MM 1/2 CIR TAPERPOINT J956H

## (undated) DEVICE — CANNULATED BONE TUNNEL PLUGS, FITS                                    7-12MM TUNNELS, LATEX FREE 6 PER BOX

## (undated) DEVICE — SUTURE VCRL SZ 0 L27IN ABSRB UD L36MM CP-1 1/2 CIR REV CUT J267H

## (undated) DEVICE — DEVON™ KNEE AND BODY STRAP 60" X 3" (1.5 M X 7.6 CM): Brand: DEVON

## (undated) DEVICE — Device

## (undated) DEVICE — UNIV CANN 5MM/76MM LTX FREE (10) BLUE

## (undated) DEVICE — INTENDED FOR TISSUE SEPARATION, AND OTHER PROCEDURES THAT REQUIRE A SHARP SURGICAL BLADE TO PUNCTURE OR CUT.: Brand: BARD-PARKER ® CARBON RIB-BACK BLADES

## (undated) DEVICE — SUTURE VCRL SZ 2-0 L27IN ABSRB UD L26MM SH 1/2 CIR J417H

## (undated) DEVICE — DRESSING,GAUZE,XEROFORM,CURAD,5"X9",ST: Brand: CURAD

## (undated) DEVICE — 1016 S-DRAPE IRRIG POUCH 10/BOX: Brand: STERI-DRAPE™

## (undated) DEVICE — TOWEL SURG W17XL27IN STD BLU COT NONFENESTRATED PREWASHED

## (undated) DEVICE — REM POLYHESIVE ADULT PATIENT RETURN ELECTRODE: Brand: VALLEYLAB

## (undated) DEVICE — DRAPE,REIN 53X77,STERILE: Brand: MEDLINE

## (undated) DEVICE — STRIP,CLOSURE,WOUND,MEDI-STRIP,1/2X4: Brand: MEDLINE

## (undated) DEVICE — SOLUTION IRRIG 3000ML LAC R FLX CONT

## (undated) DEVICE — SUTURE MCRYL SZ 4-0 L27IN ABSRB UD L19MM PS-2 1/2 CIR PRIM Y426H

## (undated) DEVICE — TUR SERIES SET

## (undated) DEVICE — SUTURE NONABSORBABLE MONOFILAMENT 3-0 PS-1 18 IN BLK ETHILON 1663H

## (undated) DEVICE — 4-PORT MANIFOLD: Brand: NEPTUNE 2

## (undated) DEVICE — SUTURE ETHBND EXCEL SZ 5 L30IN NONABSORBABLE GRN L40MM V-37 MB66G

## (undated) DEVICE — NON-ADHERENT STRIPS,OIL EMULSION: Brand: CURITY

## (undated) DEVICE — INFECTION CONTROL KIT SYS

## (undated) DEVICE — PREP SKN PREVAIL 40ML APPL --

## (undated) DEVICE — SUTURE FIBERWIRE SZ 2 W/ TAPERED NEEDLE BLUE L38IN NONABSORB BLU L26.5MM 1/2 CIRCLE AR7200

## (undated) DEVICE — SUTURE MCRYL SZ 3-0 L27IN ABSRB UD L19MM PS-2 3/8 CIR PRIM Y427H

## (undated) DEVICE — GAUZE SPONGES,12 PLY: Brand: CURITY

## (undated) DEVICE — DYONICS 25 INFLOW TUBE SET, 3 PER BOX

## (undated) DEVICE — DRAPE,EXTREMITY,89X128,STERILE: Brand: MEDLINE

## (undated) DEVICE — ZIMMER® STERILE DISPOSABLE TOURNIQUET CUFF WITH PLC, DUAL PORT, SINGLE BLADDER, 34 IN. (86 CM)

## (undated) DEVICE — SUT ETHLN 3-0 18IN PS2 BLK --

## (undated) DEVICE — ENDOBUTTON CL PAC, LATEX FREE, STERILE: Brand: ENDOBUTTON

## (undated) DEVICE — BANDAGE COMPR W6INXL10YD ST M E WHITE/BEIGE

## (undated) DEVICE — ARTHROSCOPY RICHMOND-LF: Brand: MEDLINE INDUSTRIES, INC.

## (undated) DEVICE — STERILE POLYISOPRENE POWDER-FREE SURGICAL GLOVES WITH EMOLLIENT COATING: Brand: PROTEXIS

## (undated) DEVICE — PADDING CAST SPEC 6INX4YD COT --

## (undated) DEVICE — HANDLE LT SNAP ON ULT DURABLE LENS FOR TRUMPF ALC DISPOSABLE

## (undated) DEVICE — SUTURE VCRL SZ 3-0 L27IN ABSRB VLT L26MM SH 1/2 CIR J316H

## (undated) DEVICE — 4.5 MM INCISOR PLUS STRAIGHT                                    BLADES, POWER/EP-1, VIOLET, PACKAGED                                    6 PER BOX, STERILE

## (undated) DEVICE — EXTREMITY-SFMCASU: Brand: MEDLINE INDUSTRIES, INC.

## (undated) DEVICE — ROCKER SWITCH PENCIL BLADE ELECTRODE, HOLSTER: Brand: EDGE

## (undated) DEVICE — SUTURE VCRL SZ 0 L27IN ABSRB UD L36MM CT-1 1/2 CIR J260H

## (undated) DEVICE — GLOVE ORTHO 8   MSG9480

## (undated) DEVICE — SPONGE GZ W4XL4IN COT 12 PLY TYP VII WVN C FLD DSGN

## (undated) DEVICE — PADDING CST 6IN STERILE --

## (undated) DEVICE — PACK,BASIC,SIRUS,V: Brand: MEDLINE

## (undated) DEVICE — PAD,ABDOMINAL,5"X9",ST,LF,25/BX: Brand: MEDLINE INDUSTRIES, INC.

## (undated) DEVICE — HOOK LOCK LATEX FREE ELASTIC BANDAGE 6INX5YD